# Patient Record
Sex: FEMALE | Race: WHITE | Employment: UNEMPLOYED | URBAN - METROPOLITAN AREA
[De-identification: names, ages, dates, MRNs, and addresses within clinical notes are randomized per-mention and may not be internally consistent; named-entity substitution may affect disease eponyms.]

---

## 2017-05-14 ENCOUNTER — APPOINTMENT (OUTPATIENT)
Dept: GENERAL RADIOLOGY | Age: 82
End: 2017-05-14
Payer: MEDICARE

## 2017-05-14 ENCOUNTER — HOSPITAL ENCOUNTER (EMERGENCY)
Age: 82
Discharge: HOME | End: 2017-05-14
Attending: EMERGENCY MEDICINE
Payer: MEDICARE

## 2017-05-14 VITALS
DIASTOLIC BLOOD PRESSURE: 70 MMHG | BODY MASS INDEX: 28.12 KG/M2 | SYSTOLIC BLOOD PRESSURE: 146 MMHG | TEMPERATURE: 98.6 F | OXYGEN SATURATION: 99 % | RESPIRATION RATE: 16 BRPM | HEART RATE: 72 BPM | HEIGHT: 66 IN | WEIGHT: 175 LBS

## 2017-05-14 DIAGNOSIS — N39.0 URINARY TRACT INFECTION, SITE UNSPECIFIED: Primary | ICD-10-CM

## 2017-05-14 LAB
ALBUMIN SERPL-MCNC: 4.3 G/DL (ref 3.9–4.9)
ALP BLD-CCNC: 89 U/L (ref 40–130)
ALT SERPL-CCNC: 10 U/L (ref 0–33)
AMORPHOUS: NORMAL
ANION GAP SERPL CALCULATED.3IONS-SCNC: 13 MEQ/L (ref 7–13)
AST SERPL-CCNC: 15 U/L (ref 0–35)
BASOPHILS ABSOLUTE: 0 K/UL (ref 0–0.2)
BASOPHILS RELATIVE PERCENT: 0.4 %
BILIRUB SERPL-MCNC: 0.5 MG/DL (ref 0–1.2)
BILIRUBIN URINE: NEGATIVE
BLOOD, URINE: NEGATIVE
BUN BLDV-MCNC: 11 MG/DL (ref 8–23)
CALCIUM SERPL-MCNC: 9.3 MG/DL (ref 8.6–10.2)
CHLORIDE BLD-SCNC: 101 MEQ/L (ref 98–107)
CLARITY: ABNORMAL
CO2: 23 MEQ/L (ref 22–29)
COLOR: YELLOW
CREAT SERPL-MCNC: 0.55 MG/DL (ref 0.5–0.9)
EOSINOPHILS ABSOLUTE: 0 K/UL (ref 0–0.7)
EOSINOPHILS RELATIVE PERCENT: 0.4 %
EPITHELIAL CELLS, UA: NORMAL /HPF
GFR AFRICAN AMERICAN: >60
GFR NON-AFRICAN AMERICAN: >60
GLOBULIN: 3.2 G/DL (ref 2.3–3.5)
GLUCOSE BLD-MCNC: 108 MG/DL (ref 74–109)
GLUCOSE URINE: NEGATIVE MG/DL
HCT VFR BLD CALC: 41.2 % (ref 37–47)
HEMOGLOBIN: 13.9 G/DL (ref 12–16)
KETONES, URINE: NEGATIVE MG/DL
LEUKOCYTE ESTERASE, URINE: ABNORMAL
LYMPHOCYTES ABSOLUTE: 1.9 K/UL (ref 1–4.8)
LYMPHOCYTES RELATIVE PERCENT: 33.4 %
MCH RBC QN AUTO: 31.3 PG (ref 27–31.3)
MCHC RBC AUTO-ENTMCNC: 33.8 % (ref 33–37)
MCV RBC AUTO: 92.7 FL (ref 82–100)
MONOCYTES ABSOLUTE: 0.4 K/UL (ref 0.2–0.8)
MONOCYTES RELATIVE PERCENT: 6.7 %
NEUTROPHILS ABSOLUTE: 3.4 K/UL (ref 1.4–6.5)
NEUTROPHILS RELATIVE PERCENT: 59.1 %
NITRITE, URINE: NEGATIVE
PDW BLD-RTO: 13 % (ref 11.5–14.5)
PH UA: 8 (ref 5–9)
PLATELET # BLD: 187 K/UL (ref 130–400)
POTASSIUM SERPL-SCNC: 3.8 MEQ/L (ref 3.5–5.1)
PROTEIN UA: NEGATIVE MG/DL
RBC # BLD: 4.45 M/UL (ref 4.2–5.4)
RBC UA: NORMAL /HPF (ref 0–2)
RENAL EPITHELIAL, UA: NORMAL /HPF
SODIUM BLD-SCNC: 137 MEQ/L (ref 132–144)
SPECIFIC GRAVITY UA: 1.01 (ref 1–1.03)
TOTAL PROTEIN: 7.5 G/DL (ref 6.4–8.1)
TROPONIN: <0.01 NG/ML (ref 0–0.01)
URINE REFLEX TO CULTURE: YES
UROBILINOGEN, URINE: 0.2 E.U./DL
WBC # BLD: 5.8 K/UL (ref 4.8–10.8)
WBC UA: NORMAL /HPF (ref 0–5)

## 2017-05-14 PROCEDURE — 80053 COMPREHEN METABOLIC PANEL: CPT

## 2017-05-14 PROCEDURE — 81001 URINALYSIS AUTO W/SCOPE: CPT

## 2017-05-14 PROCEDURE — 85025 COMPLETE CBC W/AUTO DIFF WBC: CPT

## 2017-05-14 PROCEDURE — 36415 COLL VENOUS BLD VENIPUNCTURE: CPT

## 2017-05-14 PROCEDURE — 87086 URINE CULTURE/COLONY COUNT: CPT

## 2017-05-14 PROCEDURE — 99284 EMERGENCY DEPT VISIT MOD MDM: CPT

## 2017-05-14 PROCEDURE — 93005 ELECTROCARDIOGRAM TRACING: CPT

## 2017-05-14 PROCEDURE — 74022 RADEX COMPL AQT ABD SERIES: CPT

## 2017-05-14 PROCEDURE — 73502 X-RAY EXAM HIP UNI 2-3 VIEWS: CPT

## 2017-05-14 PROCEDURE — 84484 ASSAY OF TROPONIN QUANT: CPT

## 2017-05-14 RX ORDER — HYDROCHLOROTHIAZIDE 12.5 MG/1
12.5 CAPSULE, GELATIN COATED ORAL DAILY
Status: ON HOLD | COMMUNITY
End: 2018-10-12

## 2017-05-14 RX ORDER — OMEPRAZOLE 40 MG/1
40 CAPSULE, DELAYED RELEASE ORAL DAILY
COMMUNITY

## 2017-05-14 RX ORDER — LISINOPRIL 10 MG/1
10 TABLET ORAL 2 TIMES DAILY
Status: ON HOLD | COMMUNITY
End: 2018-10-14

## 2017-05-14 RX ORDER — CEPHALEXIN 250 MG/1
250 CAPSULE ORAL 4 TIMES DAILY
Qty: 40 CAPSULE | Refills: 0 | Status: SHIPPED | OUTPATIENT
Start: 2017-05-14 | End: 2017-10-29 | Stop reason: ALTCHOICE

## 2017-05-14 ASSESSMENT — ENCOUNTER SYMPTOMS
VOMITING: 0
TROUBLE SWALLOWING: 0
STRIDOR: 0
PHOTOPHOBIA: 0
SHORTNESS OF BREATH: 0
BACK PAIN: 0
EYE ITCHING: 0
NAUSEA: 1
RHINORRHEA: 0
ABDOMINAL PAIN: 1
WHEEZING: 0
COLOR CHANGE: 0
CHEST TIGHTNESS: 0
CHOKING: 0
FACIAL SWELLING: 0
SINUS PRESSURE: 0
ANAL BLEEDING: 0
COUGH: 0
ABDOMINAL DISTENTION: 0
EYE PAIN: 0
CONSTIPATION: 1
EYE DISCHARGE: 0
BLOOD IN STOOL: 0
VOICE CHANGE: 0
EYE REDNESS: 0
SORE THROAT: 0
DIARRHEA: 0

## 2017-05-15 LAB
EKG ATRIAL RATE: 79 BPM
EKG P AXIS: 57 DEGREES
EKG P-R INTERVAL: 170 MS
EKG Q-T INTERVAL: 372 MS
EKG QRS DURATION: 70 MS
EKG QTC CALCULATION (BAZETT): 426 MS
EKG R AXIS: 41 DEGREES
EKG T AXIS: 61 DEGREES
EKG VENTRICULAR RATE: 79 BPM

## 2017-05-16 LAB — URINE CULTURE, ROUTINE: NORMAL

## 2017-10-29 ENCOUNTER — HOSPITAL ENCOUNTER (EMERGENCY)
Age: 82
Discharge: HOME | End: 2017-10-29
Payer: MEDICARE

## 2017-10-29 ENCOUNTER — APPOINTMENT (OUTPATIENT)
Dept: GENERAL RADIOLOGY | Age: 82
End: 2017-10-29
Payer: MEDICARE

## 2017-10-29 VITALS
HEART RATE: 80 BPM | WEIGHT: 180 LBS | RESPIRATION RATE: 20 BRPM | SYSTOLIC BLOOD PRESSURE: 168 MMHG | DIASTOLIC BLOOD PRESSURE: 92 MMHG | OXYGEN SATURATION: 97 % | BODY MASS INDEX: 28.25 KG/M2 | HEIGHT: 67 IN | TEMPERATURE: 97.7 F

## 2017-10-29 DIAGNOSIS — S93.401A SPRAIN OF RIGHT ANKLE, UNSPECIFIED LIGAMENT, INITIAL ENCOUNTER: Primary | ICD-10-CM

## 2017-10-29 PROCEDURE — 99283 EMERGENCY DEPT VISIT LOW MDM: CPT

## 2017-10-29 PROCEDURE — 73630 X-RAY EXAM OF FOOT: CPT

## 2017-10-29 PROCEDURE — 6360000002 HC RX W HCPCS: Performed by: PHYSICIAN ASSISTANT

## 2017-10-29 PROCEDURE — 73610 X-RAY EXAM OF ANKLE: CPT

## 2017-10-29 RX ORDER — ONDANSETRON 4 MG/1
4 TABLET, ORALLY DISINTEGRATING ORAL ONCE
Status: COMPLETED | OUTPATIENT
Start: 2017-10-29 | End: 2017-10-29

## 2017-10-29 RX ORDER — TRAMADOL HYDROCHLORIDE 50 MG/1
50 TABLET ORAL EVERY 6 HOURS PRN
Qty: 8 TABLET | Refills: 0 | Status: SHIPPED | OUTPATIENT
Start: 2017-10-29 | End: 2017-11-06

## 2017-10-29 RX ADMIN — ONDANSETRON 4 MG: 4 TABLET, ORALLY DISINTEGRATING ORAL at 07:58

## 2017-10-29 ASSESSMENT — PAIN DESCRIPTION - LOCATION: LOCATION: ANKLE

## 2017-10-29 ASSESSMENT — ENCOUNTER SYMPTOMS
EYE DISCHARGE: 0
SHORTNESS OF BREATH: 0
COLOR CHANGE: 0
ABDOMINAL PAIN: 0
CONSTIPATION: 0
STRIDOR: 0
SORE THROAT: 0
WHEEZING: 0
ABDOMINAL DISTENTION: 0
COUGH: 0
CHOKING: 0
RHINORRHEA: 0

## 2017-10-29 ASSESSMENT — PAIN SCALES - GENERAL: PAINLEVEL_OUTOF10: 9

## 2017-10-29 ASSESSMENT — PAIN DESCRIPTION - ORIENTATION: ORIENTATION: RIGHT

## 2017-10-29 ASSESSMENT — PAIN DESCRIPTION - DESCRIPTORS: DESCRIPTORS: THROBBING

## 2017-10-29 NOTE — ED NOTES
Pt back in [address] [place] [address], [address] [place] [address]  10/29/17 4132

## 2017-10-29 NOTE — ED PROVIDER NOTES
[facility] ED  eMERGENCY dEPARTMENT eNCOUnter      Pt Name: Anuj Dumont  MRN: [id number]  Rociogftheo 1935  Date of evaluation: 10/29/2017  Provider: Reyes Novak PA-C    CHIEF COMPLAINT       Chief Complaint   Patient presents with   Santy Pérez     was getting out of bed leaned forward down was a little dizzy fell forward landing on knees and now pain in right leg/ankle    Ankle Pain     right         HISTORY OF PRESENT ILLNESS   (Location/Symptom, Timing/Onset, Context/Setting, Quality, Duration, Modifying Factors, Severity)  Note limiting factors. Anuj Dumont is a [de-identified] y.o. female who presents to the emergency department With a complaint of right foot and ankle pain after a fall which occurred this morning. Patient states at 1:00 this morning she got up out of bed to use the bathroom she said she was sleepy she lost her balance and fell twisting her right ankle. She states increased pain with ambulation to the right foot and right ankle since the incident occurred she's been able to get up and bear weight with no other complaints. She denies any loss of consciousness she denies any head neck or back pain no chest pain or shortness of breath. She rates her current pain as a 9 out of 10 when she stands there is 0 out of 10 when she is at rest.    HPI    Nursing Notes were reviewed. REVIEW OF SYSTEMS    (2-9 systems for level 4, 10 or more for level 5)     Review of Systems   Constitutional: Negative for activity change and appetite change. HENT: Negative for congestion, ear discharge, ear pain, nosebleeds, rhinorrhea and sore throat. Eyes: Negative for discharge. Respiratory: Negative for cough, choking, shortness of breath, wheezing and stridor. Cardiovascular: Negative for chest pain, palpitations and leg swelling. Gastrointestinal: Negative for abdominal distention, abdominal pain and constipation. Genitourinary: Negative for difficulty urinating and dysuria.

## 2017-10-29 NOTE — ED TRIAGE NOTES
Pt comes in s/p fall last night around 0100. Pt made it back to bed and called daughter this am. Denies LOC. Denies hitting head. Pain is in right foot and ankle. Full sensation to RLE.

## 2018-10-09 ENCOUNTER — HOSPITAL ENCOUNTER (EMERGENCY)
Age: 83
Discharge: HOME | End: 2018-10-09
Attending: EMERGENCY MEDICINE
Payer: MEDICARE

## 2018-10-09 VITALS
OXYGEN SATURATION: 97 % | HEART RATE: 81 BPM | HEIGHT: 66 IN | SYSTOLIC BLOOD PRESSURE: 139 MMHG | DIASTOLIC BLOOD PRESSURE: 87 MMHG | TEMPERATURE: 98.5 F | WEIGHT: 180 LBS | RESPIRATION RATE: 20 BRPM | BODY MASS INDEX: 28.93 KG/M2

## 2018-10-09 DIAGNOSIS — F41.9 ANXIETY: Primary | ICD-10-CM

## 2018-10-09 DIAGNOSIS — E86.0 DEHYDRATION, MILD: ICD-10-CM

## 2018-10-09 LAB
ANION GAP SERPL CALCULATED.3IONS-SCNC: 16 MEQ/L (ref 7–13)
BILIRUBIN URINE: NEGATIVE
BLOOD, URINE: ABNORMAL
BUN BLDV-MCNC: 11 MG/DL (ref 8–23)
CALCIUM SERPL-MCNC: 9.4 MG/DL (ref 8.6–10.2)
CHLORIDE BLD-SCNC: 100 MEQ/L (ref 98–107)
CLARITY: CLEAR
CO2: 23 MEQ/L (ref 22–29)
COLOR: YELLOW
CREAT SERPL-MCNC: 0.49 MG/DL (ref 0.5–0.9)
EKG ATRIAL RATE: 76 BPM
EKG P AXIS: 26 DEGREES
EKG P-R INTERVAL: 156 MS
EKG Q-T INTERVAL: 372 MS
EKG QRS DURATION: 70 MS
EKG QTC CALCULATION (BAZETT): 418 MS
EKG R AXIS: 8 DEGREES
EKG T AXIS: 37 DEGREES
EKG VENTRICULAR RATE: 76 BPM
EPITHELIAL CELLS, UA: NORMAL /HPF
GFR AFRICAN AMERICAN: >60
GFR NON-AFRICAN AMERICAN: >60
GLUCOSE BLD-MCNC: 119 MG/DL (ref 74–109)
GLUCOSE URINE: NEGATIVE MG/DL
HCT VFR BLD CALC: 41.2 % (ref 37–47)
HEMOGLOBIN: 14.3 G/DL (ref 12–16)
KETONES, URINE: NEGATIVE MG/DL
LEUKOCYTE ESTERASE, URINE: ABNORMAL
MAGNESIUM: 2.1 MG/DL (ref 1.7–2.3)
MCH RBC QN AUTO: 32.2 PG (ref 27–31.3)
MCHC RBC AUTO-ENTMCNC: 34.6 % (ref 33–37)
MCV RBC AUTO: 93.2 FL (ref 82–100)
NITRITE, URINE: NEGATIVE
PDW BLD-RTO: 13.2 % (ref 11.5–14.5)
PH UA: 6.5 (ref 5–9)
PLATELET # BLD: 192 K/UL (ref 130–400)
POTASSIUM SERPL-SCNC: 3.5 MEQ/L (ref 3.5–5.1)
PROTEIN UA: NEGATIVE MG/DL
RBC # BLD: 4.42 M/UL (ref 4.2–5.4)
RBC UA: NORMAL /HPF (ref 0–2)
RENAL EPITHELIAL, UA: NORMAL /HPF
SODIUM BLD-SCNC: 139 MEQ/L (ref 132–144)
SPECIFIC GRAVITY UA: 1.01 (ref 1–1.03)
TROPONIN: <0.01 NG/ML (ref 0–0.01)
URINE REFLEX TO CULTURE: YES
UROBILINOGEN, URINE: 0.2 E.U./DL
WBC # BLD: 8.5 K/UL (ref 4.8–10.8)
WBC UA: NORMAL /HPF (ref 0–5)

## 2018-10-09 PROCEDURE — 99283 EMERGENCY DEPT VISIT LOW MDM: CPT

## 2018-10-09 PROCEDURE — 85027 COMPLETE CBC AUTOMATED: CPT

## 2018-10-09 PROCEDURE — 6360000002 HC RX W HCPCS: Performed by: EMERGENCY MEDICINE

## 2018-10-09 PROCEDURE — 96374 THER/PROPH/DIAG INJ IV PUSH: CPT

## 2018-10-09 PROCEDURE — 2500000003 HC RX 250 WO HCPCS: Performed by: EMERGENCY MEDICINE

## 2018-10-09 PROCEDURE — 87086 URINE CULTURE/COLONY COUNT: CPT

## 2018-10-09 PROCEDURE — 36415 COLL VENOUS BLD VENIPUNCTURE: CPT

## 2018-10-09 PROCEDURE — 96375 TX/PRO/DX INJ NEW DRUG ADDON: CPT

## 2018-10-09 PROCEDURE — 83735 ASSAY OF MAGNESIUM: CPT

## 2018-10-09 PROCEDURE — 81001 URINALYSIS AUTO W/SCOPE: CPT

## 2018-10-09 PROCEDURE — 93010 ELECTROCARDIOGRAM REPORT: CPT | Performed by: INTERNAL MEDICINE

## 2018-10-09 PROCEDURE — 80048 BASIC METABOLIC PNL TOTAL CA: CPT

## 2018-10-09 PROCEDURE — 2580000003 HC RX 258: Performed by: EMERGENCY MEDICINE

## 2018-10-09 PROCEDURE — S0028 INJECTION, FAMOTIDINE, 20 MG: HCPCS | Performed by: EMERGENCY MEDICINE

## 2018-10-09 PROCEDURE — 84484 ASSAY OF TROPONIN QUANT: CPT

## 2018-10-09 PROCEDURE — 93005 ELECTROCARDIOGRAM TRACING: CPT

## 2018-10-09 RX ORDER — ONDANSETRON 2 MG/ML
4 INJECTION INTRAMUSCULAR; INTRAVENOUS ONCE
Status: COMPLETED | OUTPATIENT
Start: 2018-10-09 | End: 2018-10-09

## 2018-10-09 RX ORDER — 0.9 % SODIUM CHLORIDE 0.9 %
500 INTRAVENOUS SOLUTION INTRAVENOUS ONCE
Status: COMPLETED | OUTPATIENT
Start: 2018-10-09 | End: 2018-10-09

## 2018-10-09 RX ADMIN — FAMOTIDINE 20 MG: 10 INJECTION, SOLUTION INTRAVENOUS at 10:41

## 2018-10-09 RX ADMIN — SODIUM CHLORIDE 500 ML: 9 INJECTION, SOLUTION INTRAVENOUS at 10:41

## 2018-10-09 RX ADMIN — ONDANSETRON 4 MG: 2 INJECTION INTRAMUSCULAR; INTRAVENOUS at 10:42

## 2018-10-09 ASSESSMENT — ENCOUNTER SYMPTOMS
COUGH: 0
NAUSEA: 1
SHORTNESS OF BREATH: 0
VOMITING: 0
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
## 2018-10-09 NOTE — ED NOTES
Pt up to bathroom with assist by 2 family members     Efrain Waldron.  [facility]  10/09/18 8312

## 2018-10-11 LAB — URINE CULTURE, ROUTINE: NORMAL

## 2018-10-12 ENCOUNTER — APPOINTMENT (OUTPATIENT)
Dept: GENERAL RADIOLOGY | Age: 83
End: 2018-10-12
Payer: MEDICARE

## 2018-10-12 ENCOUNTER — HOSPITAL ENCOUNTER (OUTPATIENT)
Age: 83
Setting detail: OBSERVATION
Discharge: HOME | End: 2018-10-14
Attending: EMERGENCY MEDICINE | Admitting: INTERNAL MEDICINE
Payer: MEDICARE

## 2018-10-12 DIAGNOSIS — R07.9 CHEST PAIN, UNSPECIFIED TYPE: Primary | ICD-10-CM

## 2018-10-12 PROBLEM — I16.0 HYPERTENSIVE URGENCY: Status: ACTIVE | Noted: 2018-10-12

## 2018-10-12 PROBLEM — I20.89 ANGINA AT REST (HCC): Status: ACTIVE | Noted: 2018-10-12

## 2018-10-12 PROBLEM — R53.82 CHRONIC FATIGUE: Status: ACTIVE | Noted: 2018-10-12

## 2018-10-12 PROBLEM — I20.8 ANGINA AT REST (HCC): Status: ACTIVE | Noted: 2018-10-12

## 2018-10-12 LAB
ALBUMIN SERPL-MCNC: 4.2 G/DL (ref 3.9–4.9)
ALP BLD-CCNC: 84 U/L (ref 40–130)
ALT SERPL-CCNC: 11 U/L (ref 0–33)
ANION GAP SERPL CALCULATED.3IONS-SCNC: 12 MEQ/L (ref 7–13)
APTT: 30.3 SEC (ref 21.6–35.4)
AST SERPL-CCNC: 21 U/L (ref 0–35)
BASOPHILS ABSOLUTE: 0 K/UL (ref 0–0.2)
BASOPHILS RELATIVE PERCENT: 0.4 %
BILIRUB SERPL-MCNC: 0.5 MG/DL (ref 0–1.2)
BUN BLDV-MCNC: 6 MG/DL (ref 8–23)
CALCIUM SERPL-MCNC: 9.4 MG/DL (ref 8.6–10.2)
CHLORIDE BLD-SCNC: 99 MEQ/L (ref 98–107)
CK MB: 1.7 NG/ML (ref 0–3.8)
CO2: 28 MEQ/L (ref 22–29)
CREAT SERPL-MCNC: 0.4 MG/DL (ref 0.5–0.9)
CREATINE KINASE-MB INDEX: 1.5 % (ref 0–3.5)
D DIMER: 0.41 MG/L FEU (ref 0–0.5)
EOSINOPHILS ABSOLUTE: 0 K/UL (ref 0–0.7)
EOSINOPHILS RELATIVE PERCENT: 0.7 %
GFR AFRICAN AMERICAN: >60
GFR NON-AFRICAN AMERICAN: >60
GLOBULIN: 3 G/DL (ref 2.3–3.5)
GLUCOSE BLD-MCNC: 105 MG/DL (ref 74–109)
HCT VFR BLD CALC: 42 % (ref 37–47)
HEMOGLOBIN: 14.2 G/DL (ref 12–16)
INR BLD: 1
LYMPHOCYTES ABSOLUTE: 1.8 K/UL (ref 1–4.8)
LYMPHOCYTES RELATIVE PERCENT: 28 %
MCH RBC QN AUTO: 31.5 PG (ref 27–31.3)
MCHC RBC AUTO-ENTMCNC: 33.7 % (ref 33–37)
MCV RBC AUTO: 93.6 FL (ref 82–100)
MONOCYTES ABSOLUTE: 0.4 K/UL (ref 0.2–0.8)
MONOCYTES RELATIVE PERCENT: 6.2 %
NEUTROPHILS ABSOLUTE: 4.1 K/UL (ref 1.4–6.5)
NEUTROPHILS RELATIVE PERCENT: 64.7 %
PDW BLD-RTO: 13.2 % (ref 11.5–14.5)
PLATELET # BLD: 191 K/UL (ref 130–400)
POTASSIUM SERPL-SCNC: 3.7 MEQ/L (ref 3.5–5.1)
PRO-BNP: 96 PG/ML
PROTHROMBIN TIME: 10.7 SEC (ref 9.6–12.3)
RBC # BLD: 4.49 M/UL (ref 4.2–5.4)
SODIUM BLD-SCNC: 139 MEQ/L (ref 132–144)
TOTAL CK: 110 U/L (ref 0–170)
TOTAL PROTEIN: 7.2 G/DL (ref 6.4–8.1)
TROPONIN: <0.01 NG/ML (ref 0–0.01)
TROPONIN: <0.01 NG/ML (ref 0–0.01)
TSH SERPL DL<=0.05 MIU/L-ACNC: 0.56 UIU/ML (ref 0.27–4.2)
TSH SERPL DL<=0.05 MIU/L-ACNC: 0.94 UIU/ML (ref 0.27–4.2)
WBC # BLD: 6.3 K/UL (ref 4.8–10.8)

## 2018-10-12 PROCEDURE — 82550 ASSAY OF CK (CPK): CPT

## 2018-10-12 PROCEDURE — G0378 HOSPITAL OBSERVATION PER HR: HCPCS

## 2018-10-12 PROCEDURE — 96376 TX/PRO/DX INJ SAME DRUG ADON: CPT

## 2018-10-12 PROCEDURE — 2580000003 HC RX 258: Performed by: INTERNAL MEDICINE

## 2018-10-12 PROCEDURE — 85025 COMPLETE CBC W/AUTO DIFF WBC: CPT

## 2018-10-12 PROCEDURE — 84443 ASSAY THYROID STIM HORMONE: CPT

## 2018-10-12 PROCEDURE — 84484 ASSAY OF TROPONIN QUANT: CPT

## 2018-10-12 PROCEDURE — 74018 RADEX ABDOMEN 1 VIEW: CPT

## 2018-10-12 PROCEDURE — 83880 ASSAY OF NATRIURETIC PEPTIDE: CPT

## 2018-10-12 PROCEDURE — 85379 FIBRIN DEGRADATION QUANT: CPT

## 2018-10-12 PROCEDURE — 6370000000 HC RX 637 (ALT 250 FOR IP): Performed by: INTERNAL MEDICINE

## 2018-10-12 PROCEDURE — 99285 EMERGENCY DEPT VISIT HI MDM: CPT

## 2018-10-12 PROCEDURE — 93005 ELECTROCARDIOGRAM TRACING: CPT

## 2018-10-12 PROCEDURE — 2580000003 HC RX 258: Performed by: EMERGENCY MEDICINE

## 2018-10-12 PROCEDURE — 96374 THER/PROPH/DIAG INJ IV PUSH: CPT

## 2018-10-12 PROCEDURE — 96372 THER/PROPH/DIAG INJ SC/IM: CPT

## 2018-10-12 PROCEDURE — 6370000000 HC RX 637 (ALT 250 FOR IP): Performed by: EMERGENCY MEDICINE

## 2018-10-12 PROCEDURE — 71045 X-RAY EXAM CHEST 1 VIEW: CPT

## 2018-10-12 PROCEDURE — 6360000002 HC RX W HCPCS: Performed by: INTERNAL MEDICINE

## 2018-10-12 PROCEDURE — 85610 PROTHROMBIN TIME: CPT

## 2018-10-12 PROCEDURE — 85730 THROMBOPLASTIN TIME PARTIAL: CPT

## 2018-10-12 PROCEDURE — 82553 CREATINE MB FRACTION: CPT

## 2018-10-12 PROCEDURE — 96375 TX/PRO/DX INJ NEW DRUG ADDON: CPT

## 2018-10-12 PROCEDURE — 80053 COMPREHEN METABOLIC PANEL: CPT

## 2018-10-12 PROCEDURE — 36415 COLL VENOUS BLD VENIPUNCTURE: CPT

## 2018-10-12 RX ORDER — ACETAMINOPHEN 325 MG/1
650 TABLET ORAL EVERY 4 HOURS PRN
Status: DISCONTINUED | OUTPATIENT
Start: 2018-10-12 | End: 2018-10-14 | Stop reason: HOSPADM

## 2018-10-12 RX ORDER — AMLODIPINE BESYLATE 2.5 MG/1
2.5 TABLET ORAL DAILY
Status: DISCONTINUED | OUTPATIENT
Start: 2018-10-12 | End: 2018-10-14

## 2018-10-12 RX ORDER — HYDROCHLOROTHIAZIDE 12.5 MG/1
12.5 CAPSULE, GELATIN COATED ORAL DAILY
Status: DISCONTINUED | OUTPATIENT
Start: 2018-10-12 | End: 2018-10-12

## 2018-10-12 RX ORDER — PANTOPRAZOLE SODIUM 40 MG/1
40 TABLET, DELAYED RELEASE ORAL
Status: DISCONTINUED | OUTPATIENT
Start: 2018-10-13 | End: 2018-10-12

## 2018-10-12 RX ORDER — OMEPRAZOLE 20 MG/1
40 CAPSULE, DELAYED RELEASE ORAL
Status: DISCONTINUED | OUTPATIENT
Start: 2018-10-13 | End: 2018-10-14 | Stop reason: HOSPADM

## 2018-10-12 RX ORDER — AMLODIPINE BESYLATE 2.5 MG/1
2.5 TABLET ORAL DAILY
Status: ON HOLD | COMMUNITY
End: 2018-10-14 | Stop reason: HOSPADM

## 2018-10-12 RX ORDER — MORPHINE SULFATE 4 MG/ML
4 INJECTION, SOLUTION INTRAMUSCULAR; INTRAVENOUS
Status: ACTIVE | OUTPATIENT
Start: 2018-10-12 | End: 2018-10-13

## 2018-10-12 RX ORDER — ONDANSETRON 2 MG/ML
4 INJECTION INTRAMUSCULAR; INTRAVENOUS EVERY 6 HOURS PRN
Status: DISCONTINUED | OUTPATIENT
Start: 2018-10-12 | End: 2018-10-14 | Stop reason: HOSPADM

## 2018-10-12 RX ORDER — KETOROLAC TROMETHAMINE 30 MG/ML
30 INJECTION, SOLUTION INTRAMUSCULAR; INTRAVENOUS ONCE
Status: DISCONTINUED | OUTPATIENT
Start: 2018-10-12 | End: 2018-10-12

## 2018-10-12 RX ORDER — ATORVASTATIN CALCIUM 20 MG/1
20 TABLET, FILM COATED ORAL NIGHTLY
Status: DISCONTINUED | OUTPATIENT
Start: 2018-10-12 | End: 2018-10-14 | Stop reason: HOSPADM

## 2018-10-12 RX ORDER — ASPIRIN 81 MG/1
324 TABLET, CHEWABLE ORAL ONCE
Status: COMPLETED | OUTPATIENT
Start: 2018-10-12 | End: 2018-10-12

## 2018-10-12 RX ORDER — LISINOPRIL 10 MG/1
10 TABLET ORAL DAILY
Status: DISCONTINUED | OUTPATIENT
Start: 2018-10-12 | End: 2018-10-14

## 2018-10-12 RX ORDER — 0.9 % SODIUM CHLORIDE 0.9 %
1000 INTRAVENOUS SOLUTION INTRAVENOUS ONCE
Status: DISCONTINUED | OUTPATIENT
Start: 2018-10-12 | End: 2018-10-12

## 2018-10-12 RX ORDER — MORPHINE SULFATE 2 MG/ML
2 INJECTION, SOLUTION INTRAMUSCULAR; INTRAVENOUS
Status: ACTIVE | OUTPATIENT
Start: 2018-10-12 | End: 2018-10-13

## 2018-10-12 RX ORDER — ASPIRIN 81 MG/1
81 TABLET, CHEWABLE ORAL DAILY
Status: DISCONTINUED | OUTPATIENT
Start: 2018-10-13 | End: 2018-10-14 | Stop reason: HOSPADM

## 2018-10-12 RX ORDER — NITROGLYCERIN 0.4 MG/1
0.4 TABLET SUBLINGUAL EVERY 5 MIN PRN
Status: DISCONTINUED | OUTPATIENT
Start: 2018-10-12 | End: 2018-10-14 | Stop reason: HOSPADM

## 2018-10-12 RX ORDER — FENTANYL CITRATE 50 UG/ML
50 INJECTION, SOLUTION INTRAMUSCULAR; INTRAVENOUS ONCE
Status: DISCONTINUED | OUTPATIENT
Start: 2018-10-12 | End: 2018-10-12

## 2018-10-12 RX ORDER — SODIUM CHLORIDE 0.9 % (FLUSH) 0.9 %
10 SYRINGE (ML) INJECTION PRN
Status: DISCONTINUED | OUTPATIENT
Start: 2018-10-12 | End: 2018-10-14 | Stop reason: HOSPADM

## 2018-10-12 RX ORDER — SODIUM CHLORIDE 0.9 % (FLUSH) 0.9 %
10 SYRINGE (ML) INJECTION EVERY 12 HOURS SCHEDULED
Status: DISCONTINUED | OUTPATIENT
Start: 2018-10-12 | End: 2018-10-14 | Stop reason: HOSPADM

## 2018-10-12 RX ADMIN — ENOXAPARIN SODIUM 40 MG: 40 INJECTION SUBCUTANEOUS at 20:39

## 2018-10-12 RX ADMIN — AMLODIPINE BESYLATE 2.5 MG: 2.5 TABLET ORAL at 20:38

## 2018-10-12 RX ADMIN — LISINOPRIL 10 MG: 10 TABLET ORAL at 20:38

## 2018-10-12 RX ADMIN — ACETAMINOPHEN 650 MG: 325 TABLET ORAL at 17:46

## 2018-10-12 RX ADMIN — SODIUM CHLORIDE 1000 ML: 9 INJECTION, SOLUTION INTRAVENOUS at 10:07

## 2018-10-12 RX ADMIN — ASPIRIN 324 MG: 81 TABLET, CHEWABLE ORAL at 09:47

## 2018-10-12 RX ADMIN — NITROGLYCERIN 0.4 MG: 0.4 TABLET, ORALLY DISINTEGRATING SUBLINGUAL at 09:52

## 2018-10-12 RX ADMIN — ATORVASTATIN CALCIUM 20 MG: 20 TABLET, FILM COATED ORAL at 20:39

## 2018-10-12 RX ADMIN — NITROGLYCERIN 0.5 INCH: 20 OINTMENT TOPICAL at 10:13

## 2018-10-12 RX ADMIN — Medication 10 ML: at 20:40

## 2018-10-12 ASSESSMENT — ENCOUNTER SYMPTOMS
VOMITING: 0
RHINORRHEA: 0
CHEST TIGHTNESS: 1
COLOR CHANGE: 0
ABDOMINAL DISTENTION: 0
NAUSEA: 1
EYE DISCHARGE: 0
SHORTNESS OF BREATH: 1
WHEEZING: 0
FACIAL SWELLING: 0
ABDOMINAL PAIN: 0
PHOTOPHOBIA: 0

## 2018-10-12 ASSESSMENT — PAIN DESCRIPTION - PAIN TYPE: TYPE: ACUTE PAIN

## 2018-10-12 ASSESSMENT — PAIN SCALES - GENERAL
PAINLEVEL_OUTOF10: 0
PAINLEVEL_OUTOF10: 2
PAINLEVEL_OUTOF10: 8
PAINLEVEL_OUTOF10: 0

## 2018-10-12 ASSESSMENT — PAIN DESCRIPTION - LOCATION: LOCATION: HEAD

## 2018-10-12 NOTE — H&P
ON 10/13/2018] omeprazole (PRILOSEC) delayed release capsule 40 mg  40 mg Oral SADIAM AMARIS Hadley MD           SocialHistory:   Social History   Substance Use Topics    Smoking status: Never Smoker    Smokeless tobacco: Never Used    Alcohol use No        Family History:   History reviewed. No pertinent family history. Review of Systems: Is negative or noncontributory except as noted. Review of Systems      Physical Examination:  BP (!) 146/89   Pulse 80   Temp 98.8 °F (37.1 °C) (Oral)   Resp 16   Ht 5' 6.5\" (1.689 m)   Wt 180 lb (81.6 kg)   SpO2 98%   BMI 28.62 kg/m²      Intake/Output Summary (Last 24 hours) at 10/12/18 1934  Last data filed at 10/12/18 1832   Gross per 24 hour   Intake              300 ml   Output                0 ml   Net              300 ml     Patient Vitals for the past 96 hrs (Last 3 readings):   Weight   10/12/18 0847 180 lb (81.6 kg)        Physical Exam     alert and oriented ×3 no jugular venous distention or carotid bruit lungs clear heart sounds regular without murmur rub or gallop abdomen is nontender extremities show no edema distal pulses are palpable 2+ out of 4+ neurologically she is alert oriented ×3 has no gross cranial nerve deficits and moves all extremities. Skin shows no petechia or rash. Musculoskeletal no major deformities identified. normal sinus rhythm, unchanged from previous tracings, normal EKG, normalsinus rhythm    Diagnostics:    EKG: Normal sinus rhythm baseline artifact no diagnostic ST-T abnormalities or pathological Q waves. Telemetry: normal sinus .       Lab Data:  BMP:  Recent Labs      10/12/18   0912   NA  139   K  3.7   CL  99   CO2  28   BUN  6*   CREATININE  0.40*   LABGLOM  >60.0       Cardiac Enzymes:  Recent Labs      10/12/18   0912  10/12/18   1325   CKTOTAL  110   --    CKMB  1.7   --    CKMBINDEX  1.5   --    TROPONINI  <0.010  <0.010       CBC:   Lab Results   Component Value Date    WBC 6.3 10/12/2018    RBC 4.49

## 2018-10-12 NOTE — ED PROVIDER NOTES
Negative for difficulty urinating, frequency, vaginal bleeding and vaginal discharge. Musculoskeletal: Negative for gait problem. Skin: Negative for color change. Allergic/Immunologic: Negative for immunocompromised state. Neurological: Positive for weakness. Negative for dizziness and light-headedness. Hematological: Negative for adenopathy. Psychiatric/Behavioral: Negative for behavioral problems. Except as noted above the remainder of the review of systems was reviewed and negative. PAST MEDICAL HISTORY     Past Medical History:   Diagnosis Date    Anxiety     Hypertension          SURGICALHISTORY       Past Surgical History:   Procedure Laterality Date    ESOPHAGUS SURGERY      FLIPPED STOMACH         CURRENT MEDICATIONS       Previous Medications    HYDROCHLOROTHIAZIDE (MICROZIDE) 12.5 MG CAPSULE    Take 12.5 mg by mouth daily    LISINOPRIL (PRINIVIL;ZESTRIL) 10 MG TABLET    Take 10 mg by mouth daily    OMEPRAZOLE (PRILOSEC) 20 MG DELAYED RELEASE CAPSULE    Take 20 mg by mouth daily       ALLERGIES     Codeine    FAMILY HISTORY     History reviewed. No pertinent family history. SOCIAL HISTORY       Social History     Social History    Marital status: Single     Spouse name: N/A    Number of children: N/A    Years of education: N/A     Social History Main Topics    Smoking status: Never Smoker    Smokeless tobacco: Never Used    Alcohol use No    Drug use: No    Sexual activity: Not Asked     Other Topics Concern    None     Social History Narrative    None       SCREENINGS      @FLOW([id number])@      PHYSICAL EXAM    (up to 7 for level 4, 8 or more for level 5)     ED Triage Vitals [10/12/18 0847]   BP Temp Temp Source Pulse Resp SpO2 Height Weight   (!) 155/89 98.4 °F (36.9 °C) Oral 72 18 96 % 5' 6.5\" (1.689 m) 180 lb (81.6 kg)       Physical Exam   Constitutional: She is oriented to person, place, and time. She appears well-developed and well-nourished.    HENT:

## 2018-10-13 ENCOUNTER — APPOINTMENT (OUTPATIENT)
Dept: GENERAL RADIOLOGY | Age: 83
End: 2018-10-13
Payer: MEDICARE

## 2018-10-13 LAB
CHOLESTEROL, TOTAL: 198 MG/DL (ref 0–199)
HCT VFR BLD CALC: 39.2 % (ref 37–47)
HDLC SERPL-MCNC: 51 MG/DL (ref 40–59)
HEMOGLOBIN: 13.1 G/DL (ref 12–16)
INR BLD: 1.1
LDL CHOLESTEROL CALCULATED: 125 MG/DL (ref 0–129)
LV EF: 60 %
LVEF MODALITY: NORMAL
MAGNESIUM: 2 MG/DL (ref 1.7–2.3)
MCH RBC QN AUTO: 31.5 PG (ref 27–31.3)
MCHC RBC AUTO-ENTMCNC: 33.4 % (ref 33–37)
MCV RBC AUTO: 94.3 FL (ref 82–100)
PDW BLD-RTO: 13 % (ref 11.5–14.5)
PLATELET # BLD: 183 K/UL (ref 130–400)
PROTHROMBIN TIME: 11.1 SEC (ref 9.6–12.3)
RBC # BLD: 4.16 M/UL (ref 4.2–5.4)
TRIGL SERPL-MCNC: 112 MG/DL (ref 0–200)
WBC # BLD: 4.1 K/UL (ref 4.8–10.8)

## 2018-10-13 PROCEDURE — 6360000002 HC RX W HCPCS: Performed by: INTERNAL MEDICINE

## 2018-10-13 PROCEDURE — 80061 LIPID PANEL: CPT

## 2018-10-13 PROCEDURE — 83735 ASSAY OF MAGNESIUM: CPT

## 2018-10-13 PROCEDURE — 85610 PROTHROMBIN TIME: CPT

## 2018-10-13 PROCEDURE — G0378 HOSPITAL OBSERVATION PER HR: HCPCS

## 2018-10-13 PROCEDURE — 6370000000 HC RX 637 (ALT 250 FOR IP): Performed by: INTERNAL MEDICINE

## 2018-10-13 PROCEDURE — 93005 ELECTROCARDIOGRAM TRACING: CPT

## 2018-10-13 PROCEDURE — 93306 TTE W/DOPPLER COMPLETE: CPT

## 2018-10-13 PROCEDURE — 96372 THER/PROPH/DIAG INJ SC/IM: CPT

## 2018-10-13 PROCEDURE — 36415 COLL VENOUS BLD VENIPUNCTURE: CPT

## 2018-10-13 PROCEDURE — 2580000003 HC RX 258: Performed by: INTERNAL MEDICINE

## 2018-10-13 PROCEDURE — 85027 COMPLETE CBC AUTOMATED: CPT

## 2018-10-13 PROCEDURE — 74018 RADEX ABDOMEN 1 VIEW: CPT

## 2018-10-13 RX ORDER — LORAZEPAM 0.5 MG/1
0.5 TABLET ORAL NIGHTLY PRN
Status: DISCONTINUED | OUTPATIENT
Start: 2018-10-13 | End: 2018-10-14 | Stop reason: HOSPADM

## 2018-10-13 RX ORDER — DOCUSATE SODIUM 100 MG/1
100 CAPSULE, LIQUID FILLED ORAL 2 TIMES DAILY PRN
Status: DISCONTINUED | OUTPATIENT
Start: 2018-10-13 | End: 2018-10-14 | Stop reason: HOSPADM

## 2018-10-13 RX ADMIN — ONDANSETRON 4 MG: 2 INJECTION INTRAMUSCULAR; INTRAVENOUS at 18:45

## 2018-10-13 RX ADMIN — ATORVASTATIN CALCIUM 20 MG: 20 TABLET, FILM COATED ORAL at 20:32

## 2018-10-13 RX ADMIN — LISINOPRIL 10 MG: 10 TABLET ORAL at 07:42

## 2018-10-13 RX ADMIN — DOCUSATE SODIUM 100 MG: 100 CAPSULE, LIQUID FILLED ORAL at 10:38

## 2018-10-13 RX ADMIN — AMLODIPINE BESYLATE 2.5 MG: 2.5 TABLET ORAL at 07:41

## 2018-10-13 RX ADMIN — ASPIRIN 81 MG: 81 TABLET, CHEWABLE ORAL at 07:46

## 2018-10-13 RX ADMIN — LORAZEPAM 0.5 MG: 0.5 TABLET ORAL at 20:32

## 2018-10-13 RX ADMIN — Medication 10 ML: at 22:27

## 2018-10-13 RX ADMIN — MAGNESIUM HYDROXIDE 30 ML: 400 SUSPENSION ORAL at 10:38

## 2018-10-13 RX ADMIN — ENOXAPARIN SODIUM 40 MG: 40 INJECTION SUBCUTANEOUS at 20:32

## 2018-10-13 RX ADMIN — ONDANSETRON 4 MG: 2 INJECTION INTRAMUSCULAR; INTRAVENOUS at 12:43

## 2018-10-13 RX ADMIN — OMEPRAZOLE 40 MG: 20 CAPSULE, DELAYED RELEASE ORAL at 06:13

## 2018-10-13 ASSESSMENT — PAIN SCALES - GENERAL
PAINLEVEL_OUTOF10: 0
PAINLEVEL_OUTOF10: 0

## 2018-10-13 NOTE — PLAN OF CARE
Problem: Pain Control  Goal: Maintain pain level at or below patient's acceptable level (or 5 if patient is unable to determine acceptable level)  Outcome: Ongoing    Goal: Improvement in pain related behaviors BP/HR WNL  Outcome: Ongoing      Problem: Cardiovascular  Goal: No DVT, peripheral vascular complications  Outcome: Ongoing    Goal: Hemodynamic stability  Outcome: Ongoing    Goal: Anticoagulate/Hct stable  Outcome: Ongoing    Goal: Agreement to quit smoking  Outcome: Ongoing    Goal: Weight maintained or lost  Outcome: Ongoing    Goal: Understanding of dietary restrictions  Outcome: Ongoing

## 2018-10-14 VITALS
DIASTOLIC BLOOD PRESSURE: 72 MMHG | WEIGHT: 180 LBS | RESPIRATION RATE: 18 BRPM | HEIGHT: 67 IN | SYSTOLIC BLOOD PRESSURE: 134 MMHG | OXYGEN SATURATION: 97 % | TEMPERATURE: 98.4 F | BODY MASS INDEX: 28.25 KG/M2 | HEART RATE: 81 BPM

## 2018-10-14 PROBLEM — F41.9 ANXIETY: Status: ACTIVE | Noted: 2018-10-14

## 2018-10-14 LAB
EKG ATRIAL RATE: 72 BPM
EKG ATRIAL RATE: 78 BPM
EKG ATRIAL RATE: 78 BPM
EKG P AXIS: 29 DEGREES
EKG P AXIS: 38 DEGREES
EKG P AXIS: 44 DEGREES
EKG P-R INTERVAL: 160 MS
EKG P-R INTERVAL: 164 MS
EKG P-R INTERVAL: 164 MS
EKG Q-T INTERVAL: 372 MS
EKG Q-T INTERVAL: 380 MS
EKG Q-T INTERVAL: 392 MS
EKG QRS DURATION: 72 MS
EKG QRS DURATION: 72 MS
EKG QRS DURATION: 74 MS
EKG QTC CALCULATION (BAZETT): 416 MS
EKG QTC CALCULATION (BAZETT): 424 MS
EKG QTC CALCULATION (BAZETT): 446 MS
EKG R AXIS: 11 DEGREES
EKG R AXIS: 29 DEGREES
EKG R AXIS: 38 DEGREES
EKG T AXIS: 27 DEGREES
EKG T AXIS: 54 DEGREES
EKG T AXIS: 55 DEGREES
EKG VENTRICULAR RATE: 72 BPM
EKG VENTRICULAR RATE: 78 BPM
EKG VENTRICULAR RATE: 78 BPM

## 2018-10-14 PROCEDURE — G0378 HOSPITAL OBSERVATION PER HR: HCPCS

## 2018-10-14 PROCEDURE — 6370000000 HC RX 637 (ALT 250 FOR IP): Performed by: INTERNAL MEDICINE

## 2018-10-14 PROCEDURE — 93005 ELECTROCARDIOGRAM TRACING: CPT

## 2018-10-14 PROCEDURE — 90686 IIV4 VACC NO PRSV 0.5 ML IM: CPT | Performed by: INTERNAL MEDICINE

## 2018-10-14 PROCEDURE — G0008 ADMIN INFLUENZA VIRUS VAC: HCPCS | Performed by: INTERNAL MEDICINE

## 2018-10-14 PROCEDURE — 6360000002 HC RX W HCPCS: Performed by: INTERNAL MEDICINE

## 2018-10-14 RX ORDER — LISINOPRIL 20 MG/1
20 TABLET ORAL DAILY
Status: DISCONTINUED | OUTPATIENT
Start: 2018-10-15 | End: 2018-10-14 | Stop reason: HOSPADM

## 2018-10-14 RX ORDER — PSEUDOEPHEDRINE HCL 30 MG
100 TABLET ORAL 2 TIMES DAILY PRN
Qty: 60 CAPSULE | Refills: 5 | Status: SHIPPED | OUTPATIENT
Start: 2018-10-14 | End: 2019-05-11

## 2018-10-14 RX ORDER — ASPIRIN 81 MG/1
81 TABLET, CHEWABLE ORAL DAILY
Qty: 30 TABLET | Refills: 3 | Status: SHIPPED | OUTPATIENT
Start: 2018-10-15 | End: 2019-05-11

## 2018-10-14 RX ORDER — NITROGLYCERIN 0.4 MG/1
TABLET SUBLINGUAL
Qty: 25 TABLET | Refills: 3 | Status: SHIPPED | OUTPATIENT
Start: 2018-10-14 | End: 2019-05-11

## 2018-10-14 RX ORDER — LISINOPRIL 10 MG/1
20 TABLET ORAL 2 TIMES DAILY
Qty: 30 TABLET | Refills: 3 | Status: SHIPPED | OUTPATIENT
Start: 2018-10-14 | End: 2018-10-14 | Stop reason: HOSPADM

## 2018-10-14 RX ORDER — LISINOPRIL 20 MG/1
20 TABLET ORAL DAILY
Qty: 30 TABLET | Refills: 3 | Status: SHIPPED | OUTPATIENT
Start: 2018-10-15

## 2018-10-14 RX ORDER — ATORVASTATIN CALCIUM 20 MG/1
20 TABLET, FILM COATED ORAL NIGHTLY
Qty: 30 TABLET | Refills: 3 | Status: SHIPPED | OUTPATIENT
Start: 2018-10-14 | End: 2019-05-11

## 2018-10-14 RX ADMIN — OMEPRAZOLE 40 MG: 20 CAPSULE, DELAYED RELEASE ORAL at 05:35

## 2018-10-14 RX ADMIN — DOCUSATE SODIUM 100 MG: 100 CAPSULE, LIQUID FILLED ORAL at 08:06

## 2018-10-14 RX ADMIN — ASPIRIN 81 MG: 81 TABLET, CHEWABLE ORAL at 08:06

## 2018-10-14 RX ADMIN — MAGNESIUM HYDROXIDE 30 ML: 400 SUSPENSION ORAL at 08:06

## 2018-10-14 RX ADMIN — LISINOPRIL 10 MG: 10 TABLET ORAL at 08:08

## 2018-10-14 RX ADMIN — AMLODIPINE BESYLATE 2.5 MG: 2.5 TABLET ORAL at 08:08

## 2018-10-14 RX ADMIN — INFLUENZA A VIRUS A/MICHIGAN/45/2015 X-275 (H1N1) ANTIGEN (FORMALDEHYDE INACTIVATED), INFLUENZA A VIRUS A/SINGAPORE/INFIMH-16-0019/2016 IVR-186 (H3N2) ANTIGEN (FORMALDEHYDE INACTIVATED), INFLUENZA B VIRUS B/PHUKET/3073/2013 ANTIGEN (FORMALDEHYDE INACTIVATED), AND INFLUENZA B VIRUS B/MARYLAND/15/2016 BX-69A ANTIGEN (FORMALDEHYDE INACTIVATED) 0.5 ML: 15; 15; 15; 15 INJECTION, SUSPENSION INTRAMUSCULAR at 16:46

## 2018-10-14 ASSESSMENT — PAIN SCALES - GENERAL: PAINLEVEL_OUTOF10: 0

## 2018-10-14 NOTE — PROGRESS NOTES
[facility] Heart Progress Note      Patient: Josie Turner  Unit/Bed: V497/L747-47  YOB: 1935  MRN: [id number]  Admit Date:  10/12/2018  Hospital Day: 0    Subjective Complaint:   Denies any chest pain with exertion or at rest, palpitations, syncope, or edema., Sx's seem purely GI at this time---Nausea and constipation. Physical Examination:     BP (!) 147/71   Pulse 82   Temp 97.9 °F (36.6 °C) (Oral)   Resp 19   Ht 5' 6.5\" (1.689 m)   Wt 180 lb (81.6 kg)   SpO2 97%   BMI 28.62 kg/m²       Intake/Output Summary (Last 24 hours) at 10/13/18 0914  Last data filed at 10/13/18 0749   Gross per 24 hour   Intake              540 ml   Output                0 ml   Net              540 ml                  Josie Turner examined at bedside in in no apparent distress, cooperative and well hydrated. Focused exam reveals:     Cardiac: Heart regular rate and rhythm     Lungs:  clear to auscultation bilaterally- no wheezes, rales or rhonchi, normal air movement, no respiratory distress    Extremities:   negative    Telemetry:      normal sinus          LABS:   CBC:   Recent Labs      10/12/18   0913  10/13/18   0604   WBC  6.3  4.1*   HGB  14.2  13.1   PLT  191  183      BMP:  Recent Labs      10/12/18   0912   NA  139   K  3.7   CL  99   CO2  28   BUN  6*   CREATININE  0.40*   GLUCOSE  105              Troponin: No results for input(s): TROPONINT in the last 72 hours. BNP:   Recent Labs      10/12/18   1325   PROBNP  96      INR:   Recent Labs      10/12/18   0912  10/13/18   0604   INR  1.0  1.1      Mg:   Recent Labs      10/13/18   0604   MG  2.0       Cardiac Testing:    none    Assessment:    No evidence of ischemia but GI sx    Plan:  1. Will get echo  2. Will consult GI  3.  See orders  Electronically signed by Milena Lay MD on 10/13/2018 at 9:14 AM
ventricle wall thickness.   Mildly enlarged right ventricle cavity.   Right ventricle global systolic function is normal .   trivial circumferential pericardial effusion with tamponade physiology.   no previous exam in which to compare    Assessment:  Pt feels much better after bowel movement  ekg and enzymes are negative  Anxiety    Plan:  1. Probable ok to d/c if ok with GI   2. Follow up with primary and dr Beckie Hodgson  3.  See orders  Electronically signed by James Tenorio MD on 10/14/2018 at 11:21 AM

## 2018-10-14 NOTE — CONSULTS
Consults  Patient Name: Hai Vance  Admit Date: 10/12/2018  8:43 AM  MR #: [id number]  : 1935    Attending Physician: Renée De Souza MD  Reason for consult: Nausea, Vomiting, constipation     History of Presenting Illness:      Hai Vance is a 82 y.o. female on hospital day 0 with a history of Anxiety, Hernia, HTN. History Obtained From:  patient  Patient reports nausea for about one week. She reports one episode of emesis that was bile. She reports dealing with constipation and has a bowel movement every three days. She reports taking MOM to have a bowel movement. She reports straining. She denies hematochezia or melena. She denies hematemesis. She denies abdominal pain. Patient reports undergoing \"stomach\" surgery in  at the [facility] [place]. Patient reports her nausea has completely resolved after having a bowel movement. She is tolerating a general diet without any complaints at this time. Patient's family is at the bedside. Patient denies any unintentional weight loss or loss of appetite. Patient reports increased anxiety due to a recent family death. Patient is requesting Ativan for home. Daughter reports she will follow-up with her PCP to discuss this. Patient denies fever or chills. She denies CP, SOB, or palpitations. History:      Past Medical History:   Diagnosis Date    Anxiety     Hernia of abdominal cavity     Hypertension      Past Surgical History:   Procedure Laterality Date    ESOPHAGUS SURGERY      FLIPPED STOMACH     Family History  History reviewed. No pertinent family history. Social History     Social History    Marital status: Single     Spouse name: N/A    Number of children: N/A    Years of education: N/A     Occupational History    Not on file.      Social History Main Topics    Smoking status: Never Smoker    Smokeless tobacco: Never Used    Alcohol use No    Drug use: No    Sexual activity: Not on file     Other Topics Concern    Not on file

## 2018-10-15 PROCEDURE — 93010 ELECTROCARDIOGRAM REPORT: CPT | Performed by: INTERNAL MEDICINE

## 2018-10-15 NOTE — DISCHARGE SUMMARY
[address]                     [address], [address] [facility]                              DISCHARGE SUMMARY    PATIENT NAME: Faraz Roche                    :         1935  MED REC NO:   [id number]                            ROOM:       P431  ACCOUNT NO:   [de-identified]                           ADMIT DATE:  10/12/2018  PROVIDER:     Daniel Jo MD              [facility] DATE:  10/14/2018    The patient presented with vague abdominal and chest discomfort. There was no objective evidence for this being related to her heart. The patient had a \"mild ileus. \"  The patient in general received an  enema, had a bowel movement, and actually felt much better. Her  echocardiogram, enzymes, and EKG were all unremarkable. GI was  consulted. However, the patient actually did well. From a  cardiovascular standpoint, we _____ her to be discharged once  evaluated by the GI service and see if any followup is necessary. Her discharge medicines included a baby aspirin a day, Lipitor 20 mg a  day, Colace 100 mg p.o. b.i.d., nitroglycerin p.r.n., Norvasc 2.5 mg a  day, and lisinopril 10. The patient also has a little bit of anxiety, and for that reason, the  patient may need some Ativan. However, we will leave that up to her  primary service Dr. Clinton Olszewski to perform that. At the time of this  dictation, the patient will be discharged home and will follow up with  Dr. Renzo Proctor pending GI evaluation. We will leave the Prilosec up to the  GI consultant.         Kisha Hoffman MD    D: 10/14/2018 11:46:28       T: 10/15/2018 9:44:56     [email or web address]  Job#: [id number]     Doc#: [id number]    CC:

## 2019-05-11 ENCOUNTER — APPOINTMENT (OUTPATIENT)
Dept: CT IMAGING | Age: 84
End: 2019-05-11
Payer: MEDICARE

## 2019-05-11 ENCOUNTER — HOSPITAL ENCOUNTER (EMERGENCY)
Age: 84
Discharge: HOME | End: 2019-05-11
Payer: MEDICARE

## 2019-05-11 ENCOUNTER — APPOINTMENT (OUTPATIENT)
Dept: GENERAL RADIOLOGY | Age: 84
End: 2019-05-11
Payer: MEDICARE

## 2019-05-11 ENCOUNTER — HOSPITAL ENCOUNTER (OUTPATIENT)
Age: 84
Setting detail: OBSERVATION
Discharge: HOME | End: 2019-05-13
Attending: EMERGENCY MEDICINE | Admitting: INTERNAL MEDICINE
Payer: MEDICARE

## 2019-05-11 VITALS
HEART RATE: 88 BPM | BODY MASS INDEX: 29.05 KG/M2 | DIASTOLIC BLOOD PRESSURE: 85 MMHG | HEIGHT: 66 IN | TEMPERATURE: 97.8 F | RESPIRATION RATE: 16 BRPM | OXYGEN SATURATION: 98 % | SYSTOLIC BLOOD PRESSURE: 160 MMHG

## 2019-05-11 DIAGNOSIS — R11.11 NON-INTRACTABLE VOMITING WITHOUT NAUSEA, UNSPECIFIED VOMITING TYPE: Primary | ICD-10-CM

## 2019-05-11 DIAGNOSIS — R11.2 NON-INTRACTABLE VOMITING WITH NAUSEA, UNSPECIFIED VOMITING TYPE: Primary | ICD-10-CM

## 2019-05-11 DIAGNOSIS — N30.01 ACUTE CYSTITIS WITH HEMATURIA: ICD-10-CM

## 2019-05-11 LAB
ALBUMIN SERPL-MCNC: 3.9 G/DL (ref 3.5–4.6)
ALBUMIN SERPL-MCNC: 4 G/DL (ref 3.5–4.6)
ALP BLD-CCNC: 83 U/L (ref 40–130)
ALP BLD-CCNC: 87 U/L (ref 40–130)
ALT SERPL-CCNC: 6 U/L (ref 0–33)
ALT SERPL-CCNC: 7 U/L (ref 0–33)
ANION GAP SERPL CALCULATED.3IONS-SCNC: 14 MEQ/L (ref 9–15)
ANION GAP SERPL CALCULATED.3IONS-SCNC: 17 MEQ/L (ref 9–15)
AST SERPL-CCNC: 12 U/L (ref 0–35)
AST SERPL-CCNC: 13 U/L (ref 0–35)
BACTERIA: NEGATIVE /HPF
BACTERIA: NEGATIVE /HPF
BASOPHILS ABSOLUTE: 0 K/UL (ref 0–0.2)
BASOPHILS ABSOLUTE: 0.1 K/UL (ref 0–0.2)
BASOPHILS RELATIVE PERCENT: 0.3 %
BASOPHILS RELATIVE PERCENT: 0.5 %
BILIRUB SERPL-MCNC: 0.4 MG/DL (ref 0.2–0.7)
BILIRUB SERPL-MCNC: 0.4 MG/DL (ref 0.2–0.7)
BILIRUBIN URINE: NEGATIVE
BILIRUBIN URINE: NEGATIVE
BLOOD, URINE: ABNORMAL
BLOOD, URINE: NEGATIVE
BUN BLDV-MCNC: 15 MG/DL (ref 8–23)
BUN BLDV-MCNC: 17 MG/DL (ref 8–23)
CALCIUM SERPL-MCNC: 8.8 MG/DL (ref 8.5–9.9)
CALCIUM SERPL-MCNC: 8.9 MG/DL (ref 8.5–9.9)
CHLORIDE BLD-SCNC: 102 MEQ/L (ref 95–107)
CHLORIDE BLD-SCNC: 98 MEQ/L (ref 95–107)
CLARITY: CLEAR
CLARITY: CLEAR
CO2: 19 MEQ/L (ref 20–31)
CO2: 24 MEQ/L (ref 20–31)
COLOR: YELLOW
COLOR: YELLOW
CREAT SERPL-MCNC: 0.48 MG/DL (ref 0.5–0.9)
CREAT SERPL-MCNC: 0.49 MG/DL (ref 0.5–0.9)
EKG ATRIAL RATE: 75 BPM
EKG ATRIAL RATE: 93 BPM
EKG P AXIS: 44 DEGREES
EKG P AXIS: 45 DEGREES
EKG P-R INTERVAL: 170 MS
EKG P-R INTERVAL: 178 MS
EKG Q-T INTERVAL: 348 MS
EKG Q-T INTERVAL: 384 MS
EKG QRS DURATION: 72 MS
EKG QRS DURATION: 74 MS
EKG QTC CALCULATION (BAZETT): 428 MS
EKG QTC CALCULATION (BAZETT): 432 MS
EKG R AXIS: 26 DEGREES
EKG R AXIS: 27 DEGREES
EKG T AXIS: 42 DEGREES
EKG T AXIS: 50 DEGREES
EKG VENTRICULAR RATE: 75 BPM
EKG VENTRICULAR RATE: 93 BPM
EOSINOPHILS ABSOLUTE: 0 K/UL (ref 0–0.7)
EOSINOPHILS ABSOLUTE: 0 K/UL (ref 0–0.7)
EOSINOPHILS RELATIVE PERCENT: 0.1 %
EOSINOPHILS RELATIVE PERCENT: 0.3 %
EPITHELIAL CELLS, UA: ABNORMAL /HPF (ref 0–5)
EPITHELIAL CELLS, UA: NORMAL /HPF (ref 0–5)
GFR AFRICAN AMERICAN: >60
GFR NON-AFRICAN AMERICAN: >60
GLOBULIN: 2.8 G/DL (ref 2.3–3.5)
GLOBULIN: 2.9 G/DL (ref 2.3–3.5)
GLUCOSE BLD-MCNC: 131 MG/DL (ref 70–99)
GLUCOSE BLD-MCNC: 135 MG/DL (ref 70–99)
GLUCOSE URINE: NEGATIVE MG/DL
GLUCOSE URINE: NEGATIVE MG/DL
HCT VFR BLD CALC: 38 % (ref 37–47)
HCT VFR BLD CALC: 38.3 % (ref 37–47)
HEMOGLOBIN: 13.1 G/DL (ref 12–16)
HEMOGLOBIN: 13.2 G/DL (ref 12–16)
HYALINE CASTS: ABNORMAL /HPF (ref 0–5)
HYALINE CASTS: NORMAL /HPF (ref 0–5)
KETONES, URINE: NEGATIVE MG/DL
KETONES, URINE: NEGATIVE MG/DL
LACTIC ACID: 1.5 MMOL/L (ref 0.5–2.2)
LEUKOCYTE ESTERASE, URINE: ABNORMAL
LEUKOCYTE ESTERASE, URINE: ABNORMAL
LIPASE: 12 U/L (ref 12–95)
LYMPHOCYTES ABSOLUTE: 0.6 K/UL (ref 1–4.8)
LYMPHOCYTES ABSOLUTE: 1.3 K/UL (ref 1–4.8)
LYMPHOCYTES RELATIVE PERCENT: 12.2 %
LYMPHOCYTES RELATIVE PERCENT: 5.9 %
MCH RBC QN AUTO: 32.7 PG (ref 27–31.3)
MCH RBC QN AUTO: 32.8 PG (ref 27–31.3)
MCHC RBC AUTO-ENTMCNC: 34.5 % (ref 33–37)
MCHC RBC AUTO-ENTMCNC: 34.5 % (ref 33–37)
MCV RBC AUTO: 94.8 FL (ref 82–100)
MCV RBC AUTO: 95.1 FL (ref 82–100)
MONOCYTES ABSOLUTE: 0.3 K/UL (ref 0.2–0.8)
MONOCYTES ABSOLUTE: 0.5 K/UL (ref 0.2–0.8)
MONOCYTES RELATIVE PERCENT: 3 %
MONOCYTES RELATIVE PERCENT: 4.6 %
NEUTROPHILS ABSOLUTE: 8.5 K/UL (ref 1.4–6.5)
NEUTROPHILS ABSOLUTE: 9.4 K/UL (ref 1.4–6.5)
NEUTROPHILS RELATIVE PERCENT: 82.6 %
NEUTROPHILS RELATIVE PERCENT: 90.5 %
NITRITE, URINE: NEGATIVE
NITRITE, URINE: NEGATIVE
PDW BLD-RTO: 13.5 % (ref 11.5–14.5)
PDW BLD-RTO: 13.7 % (ref 11.5–14.5)
PERFORMED ON: NORMAL
PH UA: 6 (ref 5–9)
PH UA: 7 (ref 5–9)
PLATELET # BLD: 171 K/UL (ref 130–400)
PLATELET # BLD: 179 K/UL (ref 130–400)
POC CREATININE WHOLE BLOOD: 0.6
POC CREATININE: 0.6 MG/DL (ref 0.6–1.2)
POC SAMPLE TYPE: NORMAL
POTASSIUM SERPL-SCNC: 3.4 MEQ/L (ref 3.4–4.9)
POTASSIUM SERPL-SCNC: 3.5 MEQ/L (ref 3.4–4.9)
PROTEIN UA: NEGATIVE MG/DL
PROTEIN UA: NEGATIVE MG/DL
RBC # BLD: 4 M/UL (ref 4.2–5.4)
RBC # BLD: 4.04 M/UL (ref 4.2–5.4)
RBC UA: ABNORMAL /HPF (ref 0–5)
RBC UA: NORMAL /HPF (ref 0–5)
SODIUM BLD-SCNC: 134 MEQ/L (ref 135–144)
SODIUM BLD-SCNC: 140 MEQ/L (ref 135–144)
SPECIFIC GRAVITY UA: 1.01 (ref 1–1.03)
SPECIFIC GRAVITY UA: 1.04 (ref 1–1.03)
TOTAL PROTEIN: 6.7 G/DL (ref 6.3–8)
TOTAL PROTEIN: 6.9 G/DL (ref 6.3–8)
TROPONIN: <0.01 NG/ML (ref 0–0.01)
URINE REFLEX TO CULTURE: YES
URINE REFLEX TO CULTURE: YES
UROBILINOGEN, URINE: 0.2 E.U./DL
UROBILINOGEN, URINE: 0.2 E.U./DL
WBC # BLD: 10.3 K/UL (ref 4.8–10.8)
WBC # BLD: 10.4 K/UL (ref 4.8–10.8)
WBC UA: ABNORMAL /HPF (ref 0–5)
WBC UA: NORMAL /HPF (ref 0–5)

## 2019-05-11 PROCEDURE — 93005 ELECTROCARDIOGRAM TRACING: CPT

## 2019-05-11 PROCEDURE — 6360000002 HC RX W HCPCS: Performed by: NURSE PRACTITIONER

## 2019-05-11 PROCEDURE — 87086 URINE CULTURE/COLONY COUNT: CPT

## 2019-05-11 PROCEDURE — G0378 HOSPITAL OBSERVATION PER HR: HCPCS

## 2019-05-11 PROCEDURE — 96374 THER/PROPH/DIAG INJ IV PUSH: CPT

## 2019-05-11 PROCEDURE — 99285 EMERGENCY DEPT VISIT HI MDM: CPT

## 2019-05-11 PROCEDURE — 85025 COMPLETE CBC W/AUTO DIFF WBC: CPT

## 2019-05-11 PROCEDURE — 2580000003 HC RX 258: Performed by: EMERGENCY MEDICINE

## 2019-05-11 PROCEDURE — 83690 ASSAY OF LIPASE: CPT

## 2019-05-11 PROCEDURE — 81001 URINALYSIS AUTO W/SCOPE: CPT

## 2019-05-11 PROCEDURE — 96376 TX/PRO/DX INJ SAME DRUG ADON: CPT

## 2019-05-11 PROCEDURE — 2580000003 HC RX 258: Performed by: NURSE PRACTITIONER

## 2019-05-11 PROCEDURE — 6360000004 HC RX CONTRAST MEDICATION: Performed by: NURSE PRACTITIONER

## 2019-05-11 PROCEDURE — 96372 THER/PROPH/DIAG INJ SC/IM: CPT

## 2019-05-11 PROCEDURE — 80053 COMPREHEN METABOLIC PANEL: CPT

## 2019-05-11 PROCEDURE — 96375 TX/PRO/DX INJ NEW DRUG ADDON: CPT

## 2019-05-11 PROCEDURE — 74177 CT ABD & PELVIS W/CONTRAST: CPT

## 2019-05-11 PROCEDURE — 6360000002 HC RX W HCPCS: Performed by: EMERGENCY MEDICINE

## 2019-05-11 PROCEDURE — 83605 ASSAY OF LACTIC ACID: CPT

## 2019-05-11 PROCEDURE — 96365 THER/PROPH/DIAG IV INF INIT: CPT

## 2019-05-11 PROCEDURE — 99284 EMERGENCY DEPT VISIT MOD MDM: CPT

## 2019-05-11 PROCEDURE — 84484 ASSAY OF TROPONIN QUANT: CPT

## 2019-05-11 PROCEDURE — 74018 RADEX ABDOMEN 1 VIEW: CPT

## 2019-05-11 PROCEDURE — 36415 COLL VENOUS BLD VENIPUNCTURE: CPT

## 2019-05-11 RX ORDER — 0.9 % SODIUM CHLORIDE 0.9 %
1000 INTRAVENOUS SOLUTION INTRAVENOUS ONCE
Status: DISCONTINUED | OUTPATIENT
Start: 2019-05-11 | End: 2019-05-13 | Stop reason: HOSPADM

## 2019-05-11 RX ORDER — PROMETHAZINE HYDROCHLORIDE 25 MG/ML
25 INJECTION, SOLUTION INTRAMUSCULAR; INTRAVENOUS ONCE
Status: COMPLETED | OUTPATIENT
Start: 2019-05-11 | End: 2019-05-11

## 2019-05-11 RX ORDER — AMLODIPINE BESYLATE 2.5 MG/1
2.5 TABLET ORAL DAILY
COMMUNITY

## 2019-05-11 RX ORDER — ONDANSETRON 2 MG/ML
4 INJECTION INTRAMUSCULAR; INTRAVENOUS ONCE
Status: COMPLETED | OUTPATIENT
Start: 2019-05-11 | End: 2019-05-11

## 2019-05-11 RX ORDER — ONDANSETRON 4 MG/1
4-8 TABLET, ORALLY DISINTEGRATING ORAL EVERY 12 HOURS PRN
Qty: 12 TABLET | Refills: 0 | Status: ON HOLD | OUTPATIENT
Start: 2019-05-11 | End: 2019-05-13 | Stop reason: SDUPTHER

## 2019-05-11 RX ORDER — ONDANSETRON 2 MG/ML
4 INJECTION INTRAMUSCULAR; INTRAVENOUS EVERY 10 MIN PRN
Status: DISCONTINUED | OUTPATIENT
Start: 2019-05-11 | End: 2019-05-11 | Stop reason: HOSPADM

## 2019-05-11 RX ORDER — SODIUM PHOSPHATE, DIBASIC AND SODIUM PHOSPHATE, MONOBASIC 7; 19 G/133ML; G/133ML
1 ENEMA RECTAL
Status: DISPENSED | OUTPATIENT
Start: 2019-05-12 | End: 2019-05-12

## 2019-05-11 RX ORDER — LORAZEPAM 0.5 MG/1
0.5 TABLET ORAL EVERY 6 HOURS PRN
COMMUNITY
End: 2020-06-25

## 2019-05-11 RX ORDER — SULFAMETHOXAZOLE AND TRIMETHOPRIM 800; 160 MG/1; MG/1
1 TABLET ORAL 2 TIMES DAILY
Qty: 14 TABLET | Refills: 0 | Status: ON HOLD | OUTPATIENT
Start: 2019-05-11 | End: 2019-05-13 | Stop reason: HOSPADM

## 2019-05-11 RX ORDER — 0.9 % SODIUM CHLORIDE 0.9 %
1000 INTRAVENOUS SOLUTION INTRAVENOUS ONCE
Status: COMPLETED | OUTPATIENT
Start: 2019-05-11 | End: 2019-05-12

## 2019-05-11 RX ORDER — 0.9 % SODIUM CHLORIDE 0.9 %
500 INTRAVENOUS SOLUTION INTRAVENOUS ONCE
Status: COMPLETED | OUTPATIENT
Start: 2019-05-11 | End: 2019-05-11

## 2019-05-11 RX ORDER — SODIUM CHLORIDE 9 MG/ML
INJECTION, SOLUTION INTRAVENOUS CONTINUOUS
Status: DISCONTINUED | OUTPATIENT
Start: 2019-05-11 | End: 2019-05-13 | Stop reason: HOSPADM

## 2019-05-11 RX ORDER — SODIUM CHLORIDE 0.9 % (FLUSH) 0.9 %
10 SYRINGE (ML) INJECTION PRN
Status: DISCONTINUED | OUTPATIENT
Start: 2019-05-11 | End: 2019-05-13 | Stop reason: HOSPADM

## 2019-05-11 RX ORDER — POLYETHYLENE GLYCOL 3350 17 G/17G
17 POWDER, FOR SOLUTION ORAL DAILY PRN
Status: DISCONTINUED | OUTPATIENT
Start: 2019-05-11 | End: 2019-05-13 | Stop reason: HOSPADM

## 2019-05-11 RX ORDER — SODIUM CHLORIDE 0.9 % (FLUSH) 0.9 %
10 SYRINGE (ML) INJECTION PRN
Status: DISCONTINUED | OUTPATIENT
Start: 2019-05-11 | End: 2019-05-11 | Stop reason: HOSPADM

## 2019-05-11 RX ORDER — SODIUM CHLORIDE 0.9 % (FLUSH) 0.9 %
10 SYRINGE (ML) INJECTION EVERY 12 HOURS SCHEDULED
Status: DISCONTINUED | OUTPATIENT
Start: 2019-05-11 | End: 2019-05-13 | Stop reason: HOSPADM

## 2019-05-11 RX ORDER — ONDANSETRON 2 MG/ML
4 INJECTION INTRAMUSCULAR; INTRAVENOUS EVERY 6 HOURS PRN
Status: DISCONTINUED | OUTPATIENT
Start: 2019-05-11 | End: 2019-05-13 | Stop reason: HOSPADM

## 2019-05-11 RX ADMIN — ONDANSETRON 4 MG: 2 INJECTION INTRAMUSCULAR; INTRAVENOUS at 07:37

## 2019-05-11 RX ADMIN — PROMETHAZINE HYDROCHLORIDE 25 MG: 25 INJECTION, SOLUTION INTRAMUSCULAR; INTRAVENOUS at 08:15

## 2019-05-11 RX ADMIN — SODIUM CHLORIDE 500 ML: 9 INJECTION, SOLUTION INTRAVENOUS at 05:43

## 2019-05-11 RX ADMIN — ONDANSETRON 4 MG: 2 INJECTION INTRAMUSCULAR; INTRAVENOUS at 23:50

## 2019-05-11 RX ADMIN — Medication 10 ML: at 06:30

## 2019-05-11 RX ADMIN — ONDANSETRON 4 MG: 2 INJECTION INTRAMUSCULAR; INTRAVENOUS at 20:46

## 2019-05-11 RX ADMIN — SODIUM CHLORIDE: 9 INJECTION, SOLUTION INTRAVENOUS at 23:50

## 2019-05-11 RX ADMIN — Medication 10 ML: at 23:53

## 2019-05-11 RX ADMIN — CEFTRIAXONE SODIUM 1 G: 1 INJECTION, POWDER, FOR SOLUTION INTRAMUSCULAR; INTRAVENOUS at 07:38

## 2019-05-11 RX ADMIN — ONDANSETRON 4 MG: 2 INJECTION INTRAMUSCULAR; INTRAVENOUS at 05:43

## 2019-05-11 RX ADMIN — IOPAMIDOL 100 ML: 755 INJECTION, SOLUTION INTRAVENOUS at 06:30

## 2019-05-11 RX ADMIN — SODIUM CHLORIDE 1000 ML: 9 INJECTION, SOLUTION INTRAVENOUS at 20:46

## 2019-05-11 ASSESSMENT — ENCOUNTER SYMPTOMS
SORE THROAT: 0
CHEST TIGHTNESS: 0
ANAL BLEEDING: 0
DIARRHEA: 0
EYE PAIN: 0
WHEEZING: 0
NAUSEA: 1
CHOKING: 0
RHINORRHEA: 0
BACK PAIN: 0
EYE ITCHING: 0
VOMITING: 1
ABDOMINAL PAIN: 0
VOMITING: 1
PHOTOPHOBIA: 0
BACK PAIN: 0
EYE REDNESS: 0
STRIDOR: 0
SHORTNESS OF BREATH: 0
COUGH: 0
SHORTNESS OF BREATH: 0
EYE DISCHARGE: 0
PHOTOPHOBIA: 0
EYE PAIN: 0
NAUSEA: 1
VOICE CHANGE: 0
ABDOMINAL DISTENTION: 0
SINUS PRESSURE: 0
TROUBLE SWALLOWING: 0
ABDOMINAL PAIN: 0
COLOR CHANGE: 0
BLOOD IN STOOL: 0
SORE THROAT: 0
CONSTIPATION: 0
FACIAL SWELLING: 0
RHINORRHEA: 0
COUGH: 0
DIARRHEA: 1

## 2019-05-11 ASSESSMENT — PAIN DESCRIPTION - LOCATION: LOCATION: HIP;GROIN

## 2019-05-11 ASSESSMENT — PAIN DESCRIPTION - ORIENTATION: ORIENTATION: RIGHT

## 2019-05-11 ASSESSMENT — PAIN SCALES - GENERAL: PAINLEVEL_OUTOF10: 8

## 2019-05-11 NOTE — ED NOTES
Pt up to bedside commode. Pt steady on feet transfers independantly. Pt awake alert color pink skin warm and dry. Pt not nauseated.       Sonny Gregg RN  05/11/19 0141

## 2019-05-11 NOTE — ED PROVIDER NOTES
[facility] ED  eMERGENCY dEPARTMENT eNCOUnter      Pt Name: Tanika Figueroa  MRN: [id number]  [place]ftheo 1935  Date of evaluation: 5/11/2019  Provider: GIBRAN Groves [id number]       Chief Complaint   Patient presents with    Emesis         HISTORY OF PRESENT ILLNESS   (Location/Symptom, Timing/Onset,Context/Setting, Quality, Duration, Modifying Factors, Severity)  Note limiting factors. Tanika Figueroa is a 82 y.o. female who presents to the emergency department with complaints of nausea and vomiting for the past 2 hours. She admits to taking zofran without relief of her symptoms. She denies any associated abdominal pain. She admits to concern for her ventral hernia. This was repair years ago, she was told to be mindful of vomiting after the surgery. She does report 2-3 days worth of diarrhea that resolved Wednesday this week. No fever, change in appetite, oral intake. Location/Symptom - n/v  Timing/Onset - 2 horus  Context/Setting - as above  Quality - nausea  Duration - 2 hours  Modifying Factors - none, no relief with zofran odt. Severity - moderate    Nursing Notes were reviewed. REVIEW OF SYSTEMS    (2-9 systems for level 4, 10 or more for level 5)     Review of Systems   Constitutional: Negative for chills, diaphoresis, fatigue and fever. HENT: Negative for congestion, rhinorrhea and sore throat. Eyes: Negative for photophobia and pain. Respiratory: Negative for cough and shortness of breath. Cardiovascular: Negative for chest pain and palpitations. Gastrointestinal: Positive for diarrhea (recent but resolved), nausea and vomiting. Negative for abdominal pain. Genitourinary: Negative for dysuria and flank pain. Musculoskeletal: Negative for back pain. Skin: Negative for rash. Neurological: Negative for dizziness, light-headedness and headaches. Psychiatric/Behavioral: Negative.     All other systems reviewed and are negative. Except as noted above the remainder of the review of systems was reviewed and negative. PAST MEDICAL HISTORY     Past Medical History:   Diagnosis Date    Anxiety     Hernia of abdominal cavity     Hypertension      Past Surgical History:   Procedure Laterality Date    ESOPHAGUS SURGERY      FLIPPED STOMACH     Social History     Socioeconomic History    Marital status: Single     Spouse name: None    Number of children: None    Years of education: None    Highest education level: None   Occupational History    None   Social Needs    Financial resource strain: None    Food insecurity:     Worry: None     Inability: None    Transportation needs:     Medical: None     Non-medical: None   Tobacco Use    Smoking status: Never Smoker    Smokeless tobacco: Never Used   Substance and Sexual Activity    Alcohol use: No    Drug use: No    Sexual activity: None   Lifestyle    Physical activity:     Days per week: None     Minutes per session: None    Stress: None   Relationships    Social connections:     Talks on phone: None     Gets together: None     Attends Spiritism service: None     Active member of club or organization: None     Attends meetings of clubs or organizations: None     Relationship status: None    Intimate partner violence:     Fear of current or ex partner: None     Emotionally abused: None     Physically abused: None     Forced sexual activity: None   Other Topics Concern    None   Social History Narrative    None       SCREENINGS      @FLOW([id number])@      PHYSICAL EXAM    (up to 7 for level 4, 8 or more for level 5)     ED Triage Vitals [05/11/19 0530]   BP Temp Temp Source Pulse Resp SpO2 Height Weight   (!) 159/68 97.8 °F (36.6 °C) Oral 72 18 97 % 5' 6\" (1.676 m) --       Physical Exam   Constitutional: She is oriented to person, place, and time. She appears well-developed and well-nourished. She is active. No distress.    HENT:   Head: Normocephalic and # 8.5 (*)     All other components within normal limits   URINE RT REFLEX TO CULTURE - Abnormal; Notable for the following components:    Blood, Urine TRACE (*)     Leukocyte Esterase, Urine MODERATE (*)     All other components within normal limits   MICROSCOPIC URINALYSIS - Abnormal; Notable for the following components:    WBC, UA 20-50 (*)     RBC, UA 6-10 (*)     All other components within normal limits   POCT CREATININE - Normal   URINE CULTURE   LACTIC ACID, PLASMA   LIPASE       All other labs were within normal range or not returned as of this dictation. EMERGENCY DEPARTMENT COURSE and DIFFERENTIAL DIAGNOSIS/MDM:   Vitals:    Vitals:    05/11/19 0530 05/11/19 0651   BP: (!) 159/68 124/77   Pulse: 72 83   Resp: 18 18   Temp: 97.8 °F (36.6 °C) 97.8 °F (36.6 °C)   TempSrc: Oral Oral   SpO2: 97% 98%   Height: 5' 6\" (1.676 m)             MDM patient is reexamined following administration of IV fluid and Zofran. She reports that she feels significantly improved. She continues to have no pain. Abdomen remains soft and nontender without rebound rigidity or guarding. She'll be reevaluated. Laboratory studies are reviewed. No acute pathology identified. Patient again reexamined her abdomen remains soft and nontender with good bowel sounds. No rebound rigidity or guarding. She continues to have no pain. We begin to discuss plan of care, the patient definitely wants to go home. CT scan of the abdomen and pelvis does not demonstrate acute pathology. Patient's abdomen is reexamined and unchanged. There continues to be no rebound rigidity or guarding or peritoneal signs. She continues to report that she feels significantly improved. Urinalysis did demonstrate findings of cystitis. She'll be provided antibiotic for this. We again discussed plan of care. She was offered observation/admission to the hospital for further evaluation and management.   She refuses and reports that she feels significantly

## 2019-05-11 NOTE — ED NOTES
Bed: 05  Expected date:   Expected time:   Means of arrival:   Comments:  84yr female - n/v      Ramesh Smiley RN  05/11/19 9696

## 2019-05-11 NOTE — ED TRIAGE NOTES
Patient to ED via [facility] with c/o emesis. Onset 2 hours. Patient states she woke up to use the restroom feeling ok and got back into bed and started to \"dry heave\". Patient denies pain or diarrhea. Patient does c/o nausea. Skin pink warm and dry, even resp.

## 2019-05-12 LAB
ANION GAP SERPL CALCULATED.3IONS-SCNC: 16 MEQ/L (ref 9–15)
BUN BLDV-MCNC: 10 MG/DL (ref 8–23)
CALCIUM SERPL-MCNC: 8.6 MG/DL (ref 8.5–9.9)
CHLORIDE BLD-SCNC: 102 MEQ/L (ref 95–107)
CO2: 22 MEQ/L (ref 20–31)
CREAT SERPL-MCNC: 0.45 MG/DL (ref 0.5–0.9)
GFR AFRICAN AMERICAN: >60
GFR NON-AFRICAN AMERICAN: >60
GLUCOSE BLD-MCNC: 127 MG/DL (ref 70–99)
HCT VFR BLD CALC: 38.1 % (ref 37–47)
HEMOGLOBIN: 13.1 G/DL (ref 12–16)
MAGNESIUM: 1.8 MG/DL (ref 1.7–2.4)
MCH RBC QN AUTO: 32.9 PG (ref 27–31.3)
MCHC RBC AUTO-ENTMCNC: 34.3 % (ref 33–37)
MCV RBC AUTO: 95.8 FL (ref 82–100)
PDW BLD-RTO: 14 % (ref 11.5–14.5)
PLATELET # BLD: 168 K/UL (ref 130–400)
POTASSIUM REFLEX MAGNESIUM: 3 MEQ/L (ref 3.4–4.9)
RBC # BLD: 3.97 M/UL (ref 4.2–5.4)
SODIUM BLD-SCNC: 140 MEQ/L (ref 135–144)
URINE CULTURE, ROUTINE: NORMAL
WBC # BLD: 7.5 K/UL (ref 4.8–10.8)

## 2019-05-12 PROCEDURE — 6360000002 HC RX W HCPCS: Performed by: NURSE PRACTITIONER

## 2019-05-12 PROCEDURE — 85027 COMPLETE CBC AUTOMATED: CPT

## 2019-05-12 PROCEDURE — 83735 ASSAY OF MAGNESIUM: CPT

## 2019-05-12 PROCEDURE — 96376 TX/PRO/DX INJ SAME DRUG ADON: CPT

## 2019-05-12 PROCEDURE — 2580000003 HC RX 258: Performed by: INTERNAL MEDICINE

## 2019-05-12 PROCEDURE — 36415 COLL VENOUS BLD VENIPUNCTURE: CPT

## 2019-05-12 PROCEDURE — 6370000000 HC RX 637 (ALT 250 FOR IP): Performed by: INTERNAL MEDICINE

## 2019-05-12 PROCEDURE — G0378 HOSPITAL OBSERVATION PER HR: HCPCS

## 2019-05-12 PROCEDURE — 96372 THER/PROPH/DIAG INJ SC/IM: CPT

## 2019-05-12 PROCEDURE — 80048 BASIC METABOLIC PNL TOTAL CA: CPT

## 2019-05-12 RX ORDER — AMLODIPINE BESYLATE 2.5 MG/1
2.5 TABLET ORAL DAILY
Status: DISCONTINUED | OUTPATIENT
Start: 2019-05-12 | End: 2019-05-13 | Stop reason: HOSPADM

## 2019-05-12 RX ORDER — SULFAMETHOXAZOLE AND TRIMETHOPRIM 800; 160 MG/1; MG/1
1 TABLET ORAL 2 TIMES DAILY
Status: DISCONTINUED | OUTPATIENT
Start: 2019-05-12 | End: 2019-05-12

## 2019-05-12 RX ORDER — POTASSIUM CHLORIDE 7.45 MG/ML
10 INJECTION INTRAVENOUS ONCE
Status: DISCONTINUED | OUTPATIENT
Start: 2019-05-12 | End: 2019-05-12

## 2019-05-12 RX ORDER — LISINOPRIL 10 MG/1
10 TABLET ORAL DAILY
Status: DISCONTINUED | OUTPATIENT
Start: 2019-05-12 | End: 2019-05-13 | Stop reason: HOSPADM

## 2019-05-12 RX ORDER — OMEPRAZOLE 20 MG/1
40 CAPSULE, DELAYED RELEASE ORAL EVERY MORNING
Status: DISCONTINUED | OUTPATIENT
Start: 2019-05-13 | End: 2019-05-13 | Stop reason: HOSPADM

## 2019-05-12 RX ORDER — ACETAMINOPHEN 325 MG/1
650 TABLET ORAL EVERY 4 HOURS PRN
Status: DISCONTINUED | OUTPATIENT
Start: 2019-05-12 | End: 2019-05-13 | Stop reason: HOSPADM

## 2019-05-12 RX ORDER — PANTOPRAZOLE SODIUM 40 MG/1
40 TABLET, DELAYED RELEASE ORAL
Status: DISCONTINUED | OUTPATIENT
Start: 2019-05-13 | End: 2019-05-12

## 2019-05-12 RX ORDER — POTASSIUM BICARBONATE 25 MEQ/1
50 TABLET, EFFERVESCENT ORAL ONCE
Status: COMPLETED | OUTPATIENT
Start: 2019-05-12 | End: 2019-05-12

## 2019-05-12 RX ORDER — LORAZEPAM 0.5 MG/1
0.5 TABLET ORAL EVERY 6 HOURS PRN
Status: DISCONTINUED | OUTPATIENT
Start: 2019-05-12 | End: 2019-05-13 | Stop reason: HOSPADM

## 2019-05-12 RX ADMIN — ONDANSETRON 4 MG: 2 INJECTION INTRAMUSCULAR; INTRAVENOUS at 14:39

## 2019-05-12 RX ADMIN — SODIUM CHLORIDE: 9 INJECTION, SOLUTION INTRAVENOUS at 23:36

## 2019-05-12 RX ADMIN — ONDANSETRON 4 MG: 2 INJECTION INTRAMUSCULAR; INTRAVENOUS at 06:53

## 2019-05-12 RX ADMIN — LISINOPRIL 10 MG: 10 TABLET ORAL at 09:56

## 2019-05-12 RX ADMIN — POTASSIUM BICARBONATE 50 MEQ: 977.5 TABLET, EFFERVESCENT ORAL at 09:20

## 2019-05-12 RX ADMIN — ENOXAPARIN SODIUM 40 MG: 40 INJECTION SUBCUTANEOUS at 09:21

## 2019-05-12 RX ADMIN — SODIUM CHLORIDE: 9 INJECTION, SOLUTION INTRAVENOUS at 12:19

## 2019-05-12 RX ADMIN — ACETAMINOPHEN 650 MG: 325 TABLET ORAL at 14:39

## 2019-05-12 RX ADMIN — AMLODIPINE BESYLATE 2.5 MG: 2.5 TABLET ORAL at 09:56

## 2019-05-12 RX ADMIN — ACETAMINOPHEN 650 MG: 325 TABLET ORAL at 02:43

## 2019-05-12 ASSESSMENT — PAIN SCALES - GENERAL
PAINLEVEL_OUTOF10: 8
PAINLEVEL_OUTOF10: 3
PAINLEVEL_OUTOF10: 1
PAINLEVEL_OUTOF10: 2
PAINLEVEL_OUTOF10: 0

## 2019-05-12 ASSESSMENT — ENCOUNTER SYMPTOMS
VOMITING: 1
SHORTNESS OF BREATH: 0
COUGH: 0
NAUSEA: 1
DIARRHEA: 0

## 2019-05-12 ASSESSMENT — PAIN DESCRIPTION - LOCATION
LOCATION: HEAD
LOCATION: HEAD

## 2019-05-12 NOTE — CARE COORDINATION
Spoke with patient regarding discharge planning. She lives at home alone and uses a walker. Her daughter lives very close. Patient denies any discharge needs. Will follow.

## 2019-05-12 NOTE — ED NOTES
attemopted to call report to floor  Sam Wilfredo they would call back      [address] Martinezirma Degroot, RN  05/11/19 9698

## 2019-05-12 NOTE — ED PROVIDER NOTES
(ATIVAN) 0.5 MG tablet Take 0.5 mg by mouth every 6 hours as needed for Anxiety. lisinopril (PRINIVIL;ZESTRIL) 20 MG tablet Take 1 tablet by mouth daily  Qty: 30 tablet, Refills: 3      omeprazole (PRILOSEC) 40 MG delayed release capsule Take 40 mg by mouth daily              ALLERGIES     Codeine; Macrobid [nitrofurantoin macrocrystal]; and Ciprofloxacin    FAMILY HISTORY     History reviewed. No pertinent family history.        SOCIAL HISTORY       Social History     Socioeconomic History    Marital status: Single     Spouse name: None    Number of children: None    Years of education: None    Highest education level: None   Occupational History    None   Social Needs    Financial resource strain: None    Food insecurity:     Worry: None     Inability: None    Transportation needs:     Medical: None     Non-medical: None   Tobacco Use    Smoking status: Never Smoker    Smokeless tobacco: Never Used   Substance and Sexual Activity    Alcohol use: No    Drug use: No    Sexual activity: None   Lifestyle    Physical activity:     Days per week: None     Minutes per session: None    Stress: None   Relationships    Social connections:     Talks on phone: None     Gets together: None     Attends Nondenominational service: None     Active member of club or organization: None     Attends meetings of clubs or organizations: None     Relationship status: None    Intimate partner violence:     Fear of current or ex partner: None     Emotionally abused: None     Physically abused: None     Forced sexual activity: None   Other Topics Concern    None   Social History Narrative    None       SCREENINGS    Issac Coma Scale  Eye Opening: Spontaneous  Best Verbal Response: Oriented  Best Motor Response: Obeys commands  Justice Coma Scale Score: 15        PHYSICAL EXAM    (up to 7 for level 4, 8 or more for level 5)     ED Triage Vitals [05/11/19 2011]   BP Temp Temp Source Pulse Resp SpO2 Height Weight   (!) 145/63 Physician who either signs or Co-signs this chart in the absence of a cardiologist.    A 12-lead EKG was performed which shows normal sinus rhythm with rate of 90 bpm.  There are nonspecific ST and T-wave changes. There is no evidence of an acute STEMI MI. There is no ectopy. The axes are normal.  Summary: abnormal EKG but no acute findings of a STEMI MI.    RADIOLOGY:   Non-plain film images such as CT, Ultrasound and MRI are read by the radiologist. Plain radiographic images are visualized and preliminarily interpreted by the emergency physician with the below findings:    No diagnostic imaging was performed    Interpretation per the Radiologist below, if available at the time of this note:    XR ABDOMEN (KUB) (SINGLE AP VIEW)   Final Result      Nonobstructive bowel gas pattern.             ED BEDSIDE ULTRASOUND:   Performed by ED Physician - none    LABS:  Labs Reviewed   URINE RT REFLEX TO CULTURE - Abnormal; Notable for the following components:       Result Value    Leukocyte Esterase, Urine TRACE (*)     All other components within normal limits   COMPREHENSIVE METABOLIC PANEL - Abnormal; Notable for the following components:    Sodium 134 (*)     CO2 19 (*)     Anion Gap 17 (*)     Glucose 135 (*)     CREATININE 0.49 (*)     All other components within normal limits   CBC WITH AUTO DIFFERENTIAL - Abnormal; Notable for the following components:    RBC 4.04 (*)     MCH 32.7 (*)     Neutrophils # 9.4 (*)     Lymphocytes # 0.6 (*)     All other components within normal limits   BASIC METABOLIC PANEL W/ REFLEX TO MG FOR LOW K - Abnormal; Notable for the following components:    Potassium reflex Magnesium 3.0 (*)     Anion Gap 16 (*)     Glucose 127 (*)     CREATININE 0.45 (*)     All other components within normal limits    Narrative:     Michelle Mancia  LC4W tel. [phone],  Potassium results called to and read back by Sheri Queen, 05/12/2019 07:51,  by Mila Ballesteros has been rescheduled by [facility] at 5/12/2019 06:46. Reason:   Patient in restroom   CBC - Abnormal; Notable for the following components:    RBC 3.97 (*)     MCH 32.9 (*)     All other components within normal limits    Narrative:     Collection has been rescheduled by [facility] at 5/12/2019 06:46. Reason:   Patient in restroom   [address] MG FOR LOW K - Abnormal; Notable for the following components:    Potassium reflex Magnesium 2.9 (*)     BUN 6 (*)     All other components within normal limits    Narrative:     Shima Mayer  LC4W tel. [phone],  [place] results called to and read back by OLESYA Stephens, 05/13/2019  07:42, by ALBA   CBC - Abnormal; Notable for the following components:    RBC 3.89 (*)     Hematocrit 36.8 (*)     MCH 31.7 (*)     All other components within normal limits   URINE CULTURE    Narrative:     ORDER#: [id number]                          ORDERED BY: JHONNY MCGRAW  SOURCE: Urine Clean Catch                  COLLECTED:  05/11/19 20:30  ANTIBIOTICS AT DADA.:                      RECEIVED :  05/11/19 23:04   TROPONIN   LIPASE   MICROSCOPIC URINALYSIS   MAGNESIUM    Narrative:     Shima Mayre  LC4W tel. [phone],  Potassium results called to and read back by Tavares Martin, 05/12/2019 07:51,  by Jose Eduardo Mcneil has been rescheduled by [facility] at 5/12/2019 06:46. Reason:   Patient in restroom   MAGNESIUM    Narrative:     Shima Mayer  LC4W tel. [phone],  [place] results called to and read back by OLESYA Stephens, 05/13/2019  07:42, by ALBA       All other labs were within normal range or not returned as of this dictation.     EMERGENCY DEPARTMENT COURSE and DIFFERENTIAL DIAGNOSIS/MDM:   Vitals:    Vitals:    05/12/19 1946 05/13/19 0536 05/13/19 0725 05/13/19 1056   BP: (!) 159/87  (!) 150/68 (!) 161/74   Pulse: 81  73 78   Resp: 18  17    Temp: 98.4 °F (36.9 °C)  98.2 °F (36.8 °C)    TempSrc: Oral  Oral    SpO2: 98%  95%    Weight:  177 lb 1.6 oz (80.3 kg)     Height:           The patient was

## 2019-05-12 NOTE — ED NOTES
Bed: 16  Expected date:   Expected time:   Means of arrival:   Comments:  82 year female - seen here earlier today, UTI, now vomiting.       Violette Ballesteros RN  05/11/19 2008

## 2019-05-12 NOTE — PROGRESS NOTES
Admission completed. Pt A&Ox4. Pt came in complaining of N/V. She has had two episodes of dry heaving and states she is nauseous. Zofran was given. LS clear bilaterally. Pt is hard of hearing, but does not have any hearing aids. Pt wears upper dentures, but does not have them with her. An abdominal hernia is noted, soft and nontender to touch. Pt stated, \"my right hip is bone to bone so I cannot lay on that side. \" Pt ambulated to bathroom with walker, tolerated well. Family at bedside. Call light within reach. Will continue to monitor.  Electronically signed by Dudley Smith RN on 5/12/19 at 1:03 AM
Pt is alert and oriented x4. She lives alone and ambulates with steady gait. Pt reports feeling somewhat better. She still has some nausea and dry heaves at times. Her potassium was 3.0 this morning. Dr. Khai Veras notified. 50Meq of K+ given orally. Will continue to monitor.     Electronically signed by Natalie Hutchinson RN on 5/12/2019 at 12:33 PM
(36.8 °C), temperature source Oral, resp. rate 17, height 5' 6.5\" (1.689 m), weight 178 lb 6.4 oz (80.9 kg), SpO2 95 %. Subjective:  Symptoms:  She reports malaise and weakness. No shortness of breath, cough, chest pain, headache, chest pressure, anorexia, diarrhea or anxiety. Diet:  She reports  nausea and vomiting. Objective:  General Appearance:  Well-appearing, in no acute distress and comfortable. Vital signs: (most recent): Blood pressure (!) 154/79, pulse 86, temperature 98.2 °F (36.8 °C), temperature source Oral, resp. rate 17, height 5' 6.5\" (1.689 m), weight 178 lb 6.4 oz (80.9 kg), SpO2 95 %. HEENT: Normal HEENT exam.    Lungs:  Normal effort and normal respiratory rate. Breath sounds clear to auscultation. Heart: Normal rate. Regular rhythm. S1 normal.    Abdomen: Abdomen is soft. Bowel sounds are normal.   There is no abdominal tenderness. Extremities: Normal range of motion. Pulses: Distal pulses are intact. Neurological: Patient is alert and oriented to person, place and time. Pupils:  Pupils are equal, round, and reactive to light. Skin:  Warm and dry.       Lab Results   Component Value Date    WBC 7.5 05/12/2019    HGB 13.1 05/12/2019    HCT 38.1 05/12/2019    MCV 95.8 05/12/2019     05/12/2019     Lab Results   Component Value Date     05/12/2019    K 3.0 05/12/2019     05/12/2019    CO2 22 05/12/2019    BUN 10 05/12/2019    CREATININE 0.45 05/12/2019    GLUCOSE 127 05/12/2019    CALCIUM 8.6 05/12/2019        Assessment & Plan  1) Nausea and vomiting better, gastritis  Advance diet as tolerated  2) hypokalemia replace  3) HTn resume home meds  4) asymptomatic bact uria  5) anxiety stable  Possible d/c tomorrow am if pt cont to lizeth Perez MD  5/12/2019

## 2019-05-12 NOTE — ED NOTES
Pt the last time she took Zofran ODT at home was around 3pm today.   Per her daughter, she had Ativan 0.5mg at [address] Lat[address] Esteban, RN  05/11/19 2016

## 2019-05-12 NOTE — ED TRIAGE NOTES
Pt arrives via EMS for c/o nausea and vomiting. Pt was seen in this ED earlier today, dx with UTI. Pt was vomiting when she was here earlier. Pt states she went home and continued to have dry heaving and vomiting all day today. Pt states she has not been able to keep even fluids down. Pt states she feels very weak. Pt alert and oriented, skin warm and dry, resps even and unlabored.   No distress noted at this time

## 2019-05-12 NOTE — H&P
Hospital Medicine History & Physical      PCP: Rj Pfeiffer MD    Date of Admission: 5/11/2019    Date of Service: Pt seen/examined on 5/11/19 and Admitted to Observation. Chief Complaint:  Nausea and vomiting       History Of Present Illness:      82 y.o. female who presented to [facility] ED with complaint of nausea and vomiting. Patient has a history of anxiety and HTN. She was seen earlier today in ER and given IVF, Zofran, and phenergan and sent home with Zofran script. Per daughter at bedside patient never truly felt better at discharge and was still unable to keep any food or medications down after returning home. She denies any abdominal pain and last episode of emesis was 2 hours prior to arrival. Xray abdomen shows large amount of stool. Patient admits to having bowel movement earlier today. She deneis cp/sob/d/fever/chills/rash. Past Medical History:          Diagnosis Date    Anxiety     Hernia of abdominal cavity     Hypertension        Past Surgical History:          Procedure Laterality Date    ESOPHAGUS SURGERY      FLIPPED STOMACH       Medications Prior to Admission:      Prior to Admission medications    Medication Sig Start Date End Date Taking? Authorizing Provider   sulfamethoxazole-trimethoprim (BACTRIM DS) 800-160 MG per tablet Take 1 tablet by mouth 2 times daily 5/11/19  Yes Nicolette King APRN - CNP   ondansetron (ZOFRAN ODT) 4 MG disintegrating tablet Take 1-2 tablets by mouth every 12 hours as needed for Nausea May Sub regular tablet (non-ODT) if insurance does not cover ODT. 5/11/19  Yes Nicolette King APRN - CNP   amLODIPine (NORVASC) 2.5 MG tablet Take 2.5 mg by mouth daily   Yes Historical Provider, MD   LORazepam (ATIVAN) 0.5 MG tablet Take 0.5 mg by mouth every 6 hours as needed for Anxiety.    Yes Historical Provider, MD   lisinopril (PRINIVIL;ZESTRIL) 20 MG tablet Take 1 tablet by mouth daily 10/15/18  Yes Allegra Boast, MD   omeprazole ([address] [place] [address]) 40 MG delayed release capsule Take 40 mg by mouth daily    Yes Historical Provider, MD       Allergies:  Codeine; Macrobid [nitrofurantoin macrocrystal]; and Ciprofloxacin    Social History:      The patient currently lives alone    TOBACCO:   reports that she has never smoked. She has never used smokeless tobacco.  ETOH:   reports that she does not drink alcohol. Family History:       Reviewed in detail and negative for DM, CAD, Cancer, CVA. History reviewed. No pertinent family history. REVIEW OF SYSTEMS:   Pertinent positives as noted in the HPI. All other systems reviewed and negative. PHYSICAL EXAM:    BP (!) 146/63   Pulse 82   Temp 98.5 °F (36.9 °C) (Oral)   Resp 16   Ht 5' 6.5\" (1.689 m)   Wt 175 lb (79.4 kg)   SpO2 98%   BMI 27.82 kg/m²     General appearance:  No apparent distress, appears stated age and cooperative. HEENT:  Normal cephalic, atraumatic without obvious deformity. Pupils equal, round, and reactive to light. Extra ocular muscles intact. Conjunctivae/corneas clear. Dry oral mucosa   Neck: Supple, with full range of motion. No jugular venous distention. Trachea midline. Respiratory:  Normal respiratory effort. Clear to auscultation, bilaterally   Cardiovascular:  Regular rate and rhythm with normal S1/S2   Abdomen: Soft, non-tender, non-distended with hypoactive bowel sounds. Musculoskeletal:  No clubbing, cyanosis or edema bilaterally. Skin: Skin color, texture, turgor normal.  No rashes or lesions. Neurologic:  Neurovascularly intact without any focal sensory/motor deficits.  Cranial nerves: II-XII intact, grossly non-focal.  Psychiatric:  Alert and oriented, thought content appropriate, normal insight  Capillary Refill: Brisk,< 3 seconds   Peripheral Pulses: +2 palpable, equal bilaterally       Labs:     Recent Labs     05/11/19  0545 05/11/19  2030   WBC 10.3 10.4   HGB 13.1 13.2   HCT 38.0 38.3    171     Recent Labs     05/11/19  0545 05/11/19  0603 05/11/19 2030     --  134*   K 3.5  --  3.4     --  98   CO2 24  --  19*   BUN 17  --  15   CREATININE 0.48* 0.6 0.49*   CALCIUM 8.9  --  8.8     Recent Labs     05/11/19 0545 05/11/19 2030   AST 12 13   ALT 6 7   BILITOT 0.4 0.4   ALKPHOS 87 83     No results for input(s): INR in the last 72 hours. Recent Labs     05/11/19 2030   TROPONINI <0.010       Urinalysis:      Lab Results   Component Value Date    NITRU Negative 05/11/2019    WBCUA 0-2 05/11/2019    BACTERIA Negative 05/11/2019    RBCUA 0-2 05/11/2019    BLOODU Negative 05/11/2019    SPECGRAV 1.012 05/11/2019    GLUCOSEU Negative 05/11/2019       Radiology:     CXR: I have reviewed the CXR with the following interpretation: pending  EKG:  I have reviewed the EKG with the following interpretation: pending    XR ABDOMEN (KUB) (SINGLE AP VIEW)    (Results Pending)       ASSESSMENT:    Active Hospital Problems    Diagnosis Date Noted    Nausea & vomiting [R11.2] 05/11/2019       PLAN:    1. Intractable nausea and vomiting- clear liquids and advance as tolerated, IVF, antiemetics as needed, repeat labs in AM  2. HTN- restart on home cardiac medications, IV prn if continues to not tolerate po intake  3. Anxiety     DVT Prophylaxis: lovenox  Diet: clear liquids and advance  Code Status: full    Dispo - observation        Emy Burch, APRN - CNP    Thank you Teri Dela Cruz MD for the opportunity to be involved in this patient's care. If you have any questions or concerns please feel free to contact me. Attending's Note:  Pt was seen and evaluated and discussed care with NP. I agree with the above assessment and plan. Patient presented to the ED twice complaining of nausea and dry heaving. She denied actual vomiting but since she woke up she has been dry heaving. She denied abd pain or diarrhea. Her symptoms are more anxiety related vs viral gastritis. Will continue Zofran and IVF.  Will monitor over night and likely discharge in the morning.         Brissa Mccarthy, [address] [facility]

## 2019-05-13 VITALS
WEIGHT: 177.1 LBS | SYSTOLIC BLOOD PRESSURE: 161 MMHG | HEART RATE: 78 BPM | HEIGHT: 67 IN | OXYGEN SATURATION: 95 % | BODY MASS INDEX: 27.8 KG/M2 | DIASTOLIC BLOOD PRESSURE: 74 MMHG | RESPIRATION RATE: 17 BRPM | TEMPERATURE: 98.2 F

## 2019-05-13 LAB
ANION GAP SERPL CALCULATED.3IONS-SCNC: 14 MEQ/L (ref 9–15)
BUN BLDV-MCNC: 6 MG/DL (ref 8–23)
CALCIUM SERPL-MCNC: 8.7 MG/DL (ref 8.5–9.9)
CHLORIDE BLD-SCNC: 103 MEQ/L (ref 95–107)
CO2: 26 MEQ/L (ref 20–31)
CREAT SERPL-MCNC: 0.61 MG/DL (ref 0.5–0.9)
GFR AFRICAN AMERICAN: >60
GFR NON-AFRICAN AMERICAN: >60
GLUCOSE BLD-MCNC: 95 MG/DL (ref 70–99)
HCT VFR BLD CALC: 36.8 % (ref 37–47)
HEMOGLOBIN: 12.3 G/DL (ref 12–16)
MAGNESIUM: 1.8 MG/DL (ref 1.7–2.4)
MCH RBC QN AUTO: 31.7 PG (ref 27–31.3)
MCHC RBC AUTO-ENTMCNC: 33.5 % (ref 33–37)
MCV RBC AUTO: 94.6 FL (ref 82–100)
PDW BLD-RTO: 14 % (ref 11.5–14.5)
PLATELET # BLD: 160 K/UL (ref 130–400)
POTASSIUM REFLEX MAGNESIUM: 2.9 MEQ/L (ref 3.4–4.9)
POTASSIUM SERPL-SCNC: 3.8 MEQ/L (ref 3.4–4.9)
RBC # BLD: 3.89 M/UL (ref 4.2–5.4)
SODIUM BLD-SCNC: 143 MEQ/L (ref 135–144)
URINE CULTURE, ROUTINE: NORMAL
WBC # BLD: 5.7 K/UL (ref 4.8–10.8)

## 2019-05-13 PROCEDURE — 6360000002 HC RX W HCPCS: Performed by: NURSE PRACTITIONER

## 2019-05-13 PROCEDURE — 84132 ASSAY OF SERUM POTASSIUM: CPT

## 2019-05-13 PROCEDURE — 93010 ELECTROCARDIOGRAM REPORT: CPT | Performed by: INTERNAL MEDICINE

## 2019-05-13 PROCEDURE — 6370000000 HC RX 637 (ALT 250 FOR IP): Performed by: INTERNAL MEDICINE

## 2019-05-13 PROCEDURE — 36415 COLL VENOUS BLD VENIPUNCTURE: CPT

## 2019-05-13 PROCEDURE — 80048 BASIC METABOLIC PNL TOTAL CA: CPT

## 2019-05-13 PROCEDURE — 96372 THER/PROPH/DIAG INJ SC/IM: CPT

## 2019-05-13 PROCEDURE — 96376 TX/PRO/DX INJ SAME DRUG ADON: CPT

## 2019-05-13 PROCEDURE — 6360000002 HC RX W HCPCS: Performed by: INTERNAL MEDICINE

## 2019-05-13 PROCEDURE — G0378 HOSPITAL OBSERVATION PER HR: HCPCS

## 2019-05-13 PROCEDURE — 83735 ASSAY OF MAGNESIUM: CPT

## 2019-05-13 PROCEDURE — 2580000003 HC RX 258: Performed by: NURSE PRACTITIONER

## 2019-05-13 PROCEDURE — 85027 COMPLETE CBC AUTOMATED: CPT

## 2019-05-13 RX ORDER — POTASSIUM CHLORIDE 20 MEQ/1
40 TABLET, EXTENDED RELEASE ORAL ONCE
Status: COMPLETED | OUTPATIENT
Start: 2019-05-13 | End: 2019-05-13

## 2019-05-13 RX ORDER — POTASSIUM CHLORIDE 7.45 MG/ML
10 INJECTION INTRAVENOUS
Status: COMPLETED | OUTPATIENT
Start: 2019-05-13 | End: 2019-05-13

## 2019-05-13 RX ORDER — ONDANSETRON 4 MG/1
4-8 TABLET, ORALLY DISINTEGRATING ORAL EVERY 12 HOURS PRN
Qty: 12 TABLET | Refills: 0 | Status: SHIPPED | OUTPATIENT
Start: 2019-05-13 | End: 2020-06-25

## 2019-05-13 RX ADMIN — ONDANSETRON 4 MG: 2 INJECTION INTRAMUSCULAR; INTRAVENOUS at 09:17

## 2019-05-13 RX ADMIN — LORAZEPAM 0.5 MG: 0.5 TABLET ORAL at 09:27

## 2019-05-13 RX ADMIN — POTASSIUM CHLORIDE 10 MEQ: 7.46 INJECTION, SOLUTION INTRAVENOUS at 12:12

## 2019-05-13 RX ADMIN — OMEPRAZOLE 40 MG: 20 CAPSULE, DELAYED RELEASE ORAL at 11:09

## 2019-05-13 RX ADMIN — ACETAMINOPHEN 650 MG: 325 TABLET ORAL at 10:57

## 2019-05-13 RX ADMIN — POTASSIUM CHLORIDE 10 MEQ: 7.46 INJECTION, SOLUTION INTRAVENOUS at 10:49

## 2019-05-13 RX ADMIN — LISINOPRIL 10 MG: 10 TABLET ORAL at 09:18

## 2019-05-13 RX ADMIN — POTASSIUM CHLORIDE 10 MEQ: 7.46 INJECTION, SOLUTION INTRAVENOUS at 09:18

## 2019-05-13 RX ADMIN — POTASSIUM CHLORIDE 10 MEQ: 7.46 INJECTION, SOLUTION INTRAVENOUS at 12:00

## 2019-05-13 RX ADMIN — Medication 10 ML: at 09:18

## 2019-05-13 RX ADMIN — POTASSIUM CHLORIDE 40 MEQ: 20 TABLET, EXTENDED RELEASE ORAL at 09:18

## 2019-05-13 RX ADMIN — AMLODIPINE BESYLATE 2.5 MG: 2.5 TABLET ORAL at 11:09

## 2019-05-13 RX ADMIN — ENOXAPARIN SODIUM 40 MG: 40 INJECTION SUBCUTANEOUS at 09:18

## 2019-05-13 ASSESSMENT — PAIN SCALES - GENERAL
PAINLEVEL_OUTOF10: 0
PAINLEVEL_OUTOF10: 6

## 2019-05-13 NOTE — CARE COORDINATION
MET WITH PATIENT, FREEDOM OF CHOICE OFFERED. STATES FROM HOME ALONE, BUT PLAN IS DC TO HOME WITH SON. DECLINES C, SNF.

## 2019-05-13 NOTE — DISCHARGE INSTR - COC
Continuity of Care Form    Patient Name: Charlie Valente   :  1935  MRN:  [id number]    Admit date:  2019  Discharge date:  ***    Code Status Order: Full Code   Advance Directives:   Advance Care Flowsheet Documentation     Date/Time Healthcare Directive Type of Healthcare Directive Copy in [address] Agent's Name Healthcare Agent's Phone Number    [phone]  No, patient does not have an advance directive for healthcare treatment -- -- -- -- --          Admitting Physician:   Zully Hayes MD  PCP: Cari Barragan MD    Discharging Nurse: [facility] Unit/Room#: J815/N953-15  Discharging Unit Phone Number: ***    Emergency Contact:   Extended Emergency Contact Information  Primary Emergency Contact: [address] [facility] [address]  Phone: [phone]  Relation: Child  Secondary Emergency Contact: Jonas Hooper   [facility] [address] [facility] Phone: [phone]  Relation: Child    Past Surgical History:  Past Surgical History:   Procedure Laterality Date    ESOPHAGUS SURGERY      FLIPPED STOMACH       Immunization History:   Immunization History   Administered Date(s) Administered    Influenza, Quadv, 6 mo and older, IM, PF (Flulaval, Fluarix) 10/14/2018       Active Problems:  Patient Active Problem List   Diagnosis Code    [facility]ina at rest ([facility] I20.8    Hypertensive urgency I16.0    Chronic fatigue R53.82    Anxiety F41.9    Nausea & vomiting R11.2       Isolation/Infection:   Isolation          No Isolation            Nurse Assessment:  Last Vital Signs: BP (!) 161/74   Pulse 78   Temp 98.2 °F (36.8 °C) (Oral)   Resp 17   Ht 5' 6.5\" (1.689 m)   Wt 177 lb 1.6 oz (80.3 kg)   SpO2 95%   BMI 28.16 kg/m²     Last documented pain score (0-10 scale): Pain Level: 6  Last Weight:   Wt Readings from Last 1 Encounters:   19 177 lb 1.6 oz (80.3 kg)     Mental Status:  {IP PT MENTAL STATUS:60056}    IV Access:  [facility] IV Status Date: ***    Readmission Risk Assessment Score:  Readmission Risk              Risk of Unplanned Readmission:        9           Discharging to Facility/ Agency   · Name:   · Address:  · Phone:  · Fax:    Dialysis Facility (if applicable)   · Name:  · Address:  · Dialysis Schedule:  · Phone:  · Fax:    / signature: {Esignature:[id number]}    PHYSICIAN SECTION    Prognosis: {Prognosis:6392519064}    Condition at Discharge: [address] [place] [address] Patient Condition:321210045}    Rehab Potential (if transferring to Rehab): {Prognosis:2772479406}    Recommended Labs or Other Treatments After Discharge: ***    Physician Certification: I certify the above information and transfer of Adrian Roe  is necessary for the continuing treatment of the diagnosis listed and that she requires {Admit to Appropriate Level of Care:44450} for {GREATER/LESS:115035532} 30 days.      Update Admission H&P: {CHP DME Changes in LIGQZ:006702160}    PHYSICIAN SIGNATURE:  {Esignature:[id number]}

## 2019-05-13 NOTE — DISCHARGE SUMMARY
2012 Findings: Minimal bibasilar atelectasis. Ventral abdominal wall hernia of the upper abdomen measuring approximately 5 cm in AP dimension by 11 cm in transverse dimension by 13 cm in craniocaudal dimension with a mouth measuring up to 6 cm. This hernia contains a portion of the transverse colon and mesenteric fat without evidence of colonic obstruction or ischemia. The liver, gallbladder, spleen,  pancreas, and adrenal glands are within normal limits. Postsurgical changes of the stomach. The kidneys enhance uniformly. Multiple subcentimeter hypodensities within the right kidney are too small to definitively characterize. 11 mm left upper pole renal cyst and 10 mm left lower pole renal cyst. Additional subcentimeter hypodensities of the left kidney are too small to definitively characterize. No urinary tract calculi or hydronephrosis. Urinary bladder is well distended. Uterus is present. Abdominal aorta is nonaneurysmal  and demonstrates atherosclerotic calcification . No retroperitoneal or abdominal/pelvic lymphadenopathy. No small bowel obstruction. Diffuse nondistended nonspecific fluid-filled loops of small bowel. Fluid within the cecum, ascending, and proximal transverse colon. Sigmoid and descending colon diverticuli are identified. No overt colonic mass or pericolonic inflammation. Appendix is within normal limits. No free fluid or free air. Degenerative changes of the spine and right hip     Findings which may relate to enterocolitis. Colonic diverticulosis without diverticulitis. Ventral abdominal wall hernia measuring 5 x 11 x 13 cm continued portion of the transverse colon. This does not result in colonic obstruction and there are no findings colon ischemia. All CT scans at this facility use dose modulation, iterative reconstruction, and/or weight based dosing when appropriate to reduce radiation dose to as low as reasonably achievable.       Discharge Medications:       [employer] [place] Medication Instructions KEIRA:[id number]    Printed on:05/13/19 6163   Medication Information                      amLODIPine (NORVASC) 2.5 MG tablet  Take 2.5 mg by mouth daily             lisinopril (PRINIVIL;ZESTRIL) 20 MG tablet  Take 1 tablet by mouth daily             LORazepam (ATIVAN) 0.5 MG tablet  Take 0.5 mg by mouth every 6 hours as needed for Anxiety. omeprazole (PRILOSEC) 40 MG delayed release capsule  Take 40 mg by mouth daily              ondansetron (ZOFRAN ODT) 4 MG disintegrating tablet  Take 1-2 tablets by mouth every 12 hours as needed for Nausea May Sub regular tablet (non-ODT) if insurance does not cover ODT. Disposition:   Discharged to Home. Any [facility] needs that were indicated and/or required as been addressed and set up by Social Work. Condition at discharge: Pt was medically stable at the time of discharge. Significant improvement in clinical condition compared to initial condition at presentation to hospital    Activity: activity as tolerated, fall precautions. Total time taken for discharging this patient: 40 minutes. Greater than 70% of time was spent focused exclusively on this patient. Time was taken to review chart, discuss plans with consultants, reconciling medications, discussing plan answering questions with patient.      SignedDaluana Ros  5/13/2019, 12:47 PM  ----------------------------------------------------------------------------------------------------------------------    Arnel Roles,     Please return to ER or call 911 if you develop any significant signs or symptoms.     I may not have addressed all of your medical illnesses or the abnormal blood work or imaging therefore please ask your PCP, Alexandra Horan MD ,  to obtain [facility] record to follow up on all of the abnormal labs, imaging and findings that I have and have not addressed during your hospitalization.      Discharging you from the hospital does not mean that your medical care ends here and now. You may still need additional work up, investigation, monitoring, and treatment to be handled from this point on by outside providers including your PCP, Kiara Ross MD , Specialists and other healthcare providers.      Please review your list of discharge medications prior to resuming medications you might still have at home, as the medications you need to be taking, dosages or how often you must take them may have changed. For medication questions, contact your retail pharmacy and your PCP, Kiara Ross MD .     ** I STRONGLY RECOMMEND that you follow up with Kiara Ross MD within 3 to 5 days for a post hospitalization evaluation. This specific office visit is covered by your insurance, and is not the same as your annual doctor visit/ check up. This office visit is important, as it may prevent need for repeat and/or future hospitalizations. **    Your medical team at [facility] ([facility]) appreciates the opportunity to work with you to get well!     Sincerely,  Katie Nelson

## 2020-06-25 ENCOUNTER — APPOINTMENT (OUTPATIENT)
Dept: CT IMAGING | Age: 85
End: 2020-06-25
Payer: MEDICARE

## 2020-06-25 ENCOUNTER — HOSPITAL ENCOUNTER (EMERGENCY)
Age: 85
Discharge: HOME | End: 2020-06-25
Attending: EMERGENCY MEDICINE
Payer: MEDICARE

## 2020-06-25 VITALS
HEART RATE: 74 BPM | BODY MASS INDEX: 30.05 KG/M2 | OXYGEN SATURATION: 100 % | TEMPERATURE: 98.1 F | HEIGHT: 66 IN | RESPIRATION RATE: 20 BRPM | SYSTOLIC BLOOD PRESSURE: 133 MMHG | WEIGHT: 187 LBS | DIASTOLIC BLOOD PRESSURE: 84 MMHG

## 2020-06-25 LAB
ALBUMIN SERPL-MCNC: 4.3 G/DL (ref 3.5–4.6)
ALP BLD-CCNC: 87 U/L (ref 40–130)
ALT SERPL-CCNC: 6 U/L (ref 0–33)
ANION GAP SERPL CALCULATED.3IONS-SCNC: 11 MEQ/L (ref 9–15)
AST SERPL-CCNC: 12 U/L (ref 0–35)
BASOPHILS ABSOLUTE: 0 K/UL (ref 0–0.2)
BASOPHILS RELATIVE PERCENT: 0.2 %
BILIRUB SERPL-MCNC: 0.5 MG/DL (ref 0.2–0.7)
BUN BLDV-MCNC: 9 MG/DL (ref 8–23)
CALCIUM SERPL-MCNC: 10.2 MG/DL (ref 8.5–9.9)
CHLORIDE BLD-SCNC: 100 MEQ/L (ref 95–107)
CO2: 25 MEQ/L (ref 20–31)
CREAT SERPL-MCNC: 0.54 MG/DL (ref 0.5–0.9)
EKG ATRIAL RATE: 82 BPM
EKG P AXIS: 39 DEGREES
EKG P-R INTERVAL: 154 MS
EKG Q-T INTERVAL: 406 MS
EKG QRS DURATION: 62 MS
EKG QTC CALCULATION (BAZETT): 474 MS
EKG R AXIS: 16 DEGREES
EKG T AXIS: 58 DEGREES
EKG VENTRICULAR RATE: 82 BPM
EOSINOPHILS ABSOLUTE: 0 K/UL (ref 0–0.7)
EOSINOPHILS RELATIVE PERCENT: 0.6 %
GFR AFRICAN AMERICAN: >60
GFR AFRICAN AMERICAN: >60
GFR NON-AFRICAN AMERICAN: >60
GFR NON-AFRICAN AMERICAN: >60
GLOBULIN: 3.1 G/DL (ref 2.3–3.5)
GLUCOSE BLD-MCNC: 121 MG/DL (ref 70–99)
HCT VFR BLD CALC: 40.9 % (ref 37–47)
HEMOGLOBIN: 13.7 G/DL (ref 12–16)
LYMPHOCYTES ABSOLUTE: 1.5 K/UL (ref 1–4.8)
LYMPHOCYTES RELATIVE PERCENT: 23.5 %
MAGNESIUM: 2.1 MG/DL (ref 1.7–2.4)
MCH RBC QN AUTO: 31.5 PG (ref 27–31.3)
MCHC RBC AUTO-ENTMCNC: 33.6 % (ref 33–37)
MCV RBC AUTO: 93.7 FL (ref 82–100)
MONOCYTES ABSOLUTE: 0.4 K/UL (ref 0.2–0.8)
MONOCYTES RELATIVE PERCENT: 6.1 %
NEUTROPHILS ABSOLUTE: 4.4 K/UL (ref 1.4–6.5)
NEUTROPHILS RELATIVE PERCENT: 69.6 %
PDW BLD-RTO: 12.9 % (ref 11.5–14.5)
PERFORMED ON: NORMAL
PLATELET # BLD: 197 K/UL (ref 130–400)
POC CREATININE: 0.6 MG/DL (ref 0.6–1.2)
POC SAMPLE TYPE: NORMAL
POTASSIUM SERPL-SCNC: 3.5 MEQ/L (ref 3.4–4.9)
PRO-BNP: 163 PG/ML
RBC # BLD: 4.36 M/UL (ref 4.2–5.4)
SODIUM BLD-SCNC: 136 MEQ/L (ref 135–144)
TOTAL PROTEIN: 7.4 G/DL (ref 6.3–8)
TROPONIN: <0.01 NG/ML (ref 0–0.01)
TSH SERPL DL<=0.05 MIU/L-ACNC: 0.83 UIU/ML (ref 0.44–3.86)
WBC # BLD: 6.4 K/UL (ref 4.8–10.8)

## 2020-06-25 PROCEDURE — 84484 ASSAY OF TROPONIN QUANT: CPT

## 2020-06-25 PROCEDURE — 85025 COMPLETE CBC W/AUTO DIFF WBC: CPT

## 2020-06-25 PROCEDURE — 93005 ELECTROCARDIOGRAM TRACING: CPT | Performed by: EMERGENCY MEDICINE

## 2020-06-25 PROCEDURE — 84443 ASSAY THYROID STIM HORMONE: CPT

## 2020-06-25 PROCEDURE — 99285 EMERGENCY DEPT VISIT HI MDM: CPT

## 2020-06-25 PROCEDURE — 80053 COMPREHEN METABOLIC PANEL: CPT

## 2020-06-25 PROCEDURE — 6360000004 HC RX CONTRAST MEDICATION: Performed by: EMERGENCY MEDICINE

## 2020-06-25 PROCEDURE — 83880 ASSAY OF NATRIURETIC PEPTIDE: CPT

## 2020-06-25 PROCEDURE — 36415 COLL VENOUS BLD VENIPUNCTURE: CPT

## 2020-06-25 PROCEDURE — 83735 ASSAY OF MAGNESIUM: CPT

## 2020-06-25 PROCEDURE — 96375 TX/PRO/DX INJ NEW DRUG ADDON: CPT

## 2020-06-25 PROCEDURE — 71275 CT ANGIOGRAPHY CHEST: CPT

## 2020-06-25 PROCEDURE — 96374 THER/PROPH/DIAG INJ IV PUSH: CPT

## 2020-06-25 PROCEDURE — 6360000002 HC RX W HCPCS: Performed by: EMERGENCY MEDICINE

## 2020-06-25 RX ORDER — ONDANSETRON 4 MG/1
4 TABLET, ORALLY DISINTEGRATING ORAL 3 TIMES DAILY PRN
Qty: 21 TABLET | Refills: 0 | Status: SHIPPED | OUTPATIENT
Start: 2020-06-25

## 2020-06-25 RX ORDER — ONDANSETRON 2 MG/ML
4 INJECTION INTRAMUSCULAR; INTRAVENOUS ONCE
Status: COMPLETED | OUTPATIENT
Start: 2020-06-25 | End: 2020-06-25

## 2020-06-25 RX ORDER — LORAZEPAM 2 MG/ML
0.5 INJECTION INTRAMUSCULAR ONCE
Status: COMPLETED | OUTPATIENT
Start: 2020-06-25 | End: 2020-06-25

## 2020-06-25 RX ORDER — LORAZEPAM 1 MG/1
1 TABLET ORAL DAILY PRN
Qty: 12 TABLET | Refills: 0 | Status: SHIPPED | OUTPATIENT
Start: 2020-06-25 | End: 2020-07-07

## 2020-06-25 RX ADMIN — ONDANSETRON 4 MG: 2 INJECTION INTRAMUSCULAR; INTRAVENOUS at 10:12

## 2020-06-25 RX ADMIN — IOPAMIDOL 100 ML: 612 INJECTION, SOLUTION INTRAVENOUS at 10:31

## 2020-06-25 RX ADMIN — LORAZEPAM 0.5 MG: 2 INJECTION INTRAMUSCULAR; INTRAVENOUS at 10:12

## 2020-06-25 ASSESSMENT — ENCOUNTER SYMPTOMS
DIARRHEA: 0
BACK PAIN: 0
COUGH: 0
ABDOMINAL PAIN: 0
NAUSEA: 0
VOMITING: 0
SORE THROAT: 0
SHORTNESS OF BREATH: 1

## 2020-06-25 ASSESSMENT — PAIN SCALES - GENERAL: PAINLEVEL_OUTOF10: 6

## 2020-06-25 ASSESSMENT — PAIN DESCRIPTION - ONSET: ONSET: ON-GOING

## 2020-06-25 ASSESSMENT — PAIN DESCRIPTION - PROGRESSION: CLINICAL_PROGRESSION: GRADUALLY WORSENING

## 2020-06-25 ASSESSMENT — PAIN DESCRIPTION - ORIENTATION: ORIENTATION: LEFT

## 2020-06-25 ASSESSMENT — PAIN DESCRIPTION - FREQUENCY: FREQUENCY: CONTINUOUS

## 2020-06-25 ASSESSMENT — PAIN DESCRIPTION - DESCRIPTORS: DESCRIPTORS: ACHING

## 2020-06-25 ASSESSMENT — PAIN DESCRIPTION - LOCATION: LOCATION: CHEST

## 2020-06-25 ASSESSMENT — PAIN DESCRIPTION - PAIN TYPE: TYPE: ACUTE PAIN

## 2020-06-25 NOTE — ED PROVIDER NOTES
 Years of education: None    Highest education level: None   Occupational History    None   Social Needs    Financial resource strain: None    Food insecurity     Worry: None     Inability: None    Transportation needs     Medical: None     Non-medical: None   Tobacco Use    Smoking status: Never Smoker    Smokeless tobacco: Never Used   Substance and Sexual Activity    Alcohol use: No    Drug use: No    Sexual activity: None   Lifestyle    Physical activity     Days per week: None     Minutes per session: None    Stress: None   Relationships    Social connections     Talks on phone: None     Gets together: None     Attends Advent service: None     Active member of club or organization: None     Attends meetings of clubs or organizations: None     Relationship status: None    Intimate partner violence     Fear of current or ex partner: None     Emotionally abused: None     Physically abused: None     Forced sexual activity: None   Other Topics Concern    None   Social History Narrative    None         PHYSICAL EXAM       ED Triage Vitals   BP Temp Temp src Pulse Resp SpO2 Height Weight   -- -- -- -- -- -- -- --       Physical Exam  Vitals signs and nursing note reviewed. Constitutional:       Appearance: She is well-developed. HENT:      Head: Normocephalic. Right Ear: External ear normal.      Left Ear: External ear normal.   Eyes:      Conjunctiva/sclera: Conjunctivae normal.      Pupils: Pupils are equal, round, and reactive to light. Neck:      Musculoskeletal: Normal range of motion and neck supple. Cardiovascular:      Rate and Rhythm: Normal rate and regular rhythm. Heart sounds: Normal heart sounds. Pulmonary:      Effort: Pulmonary effort is normal.      Breath sounds: Normal breath sounds. Abdominal:      General: Bowel sounds are normal. There is no distension. Palpations: Abdomen is soft. Tenderness: There is no abdominal tenderness. Musculoskeletal: Normal range of motion. Skin:     General: Skin is warm and dry. Neurological:      Mental Status: She is alert and oriented to person, place, and time. Psychiatric:      Comments: Anxious           MDM  83 yo female presents to the ED with sob. Pt is afebrile, hemodynamically stable. Pt given IV ativan, IV zofran in the ED. EKG shows NSR with HR 82, normal axis, normal intervals, no ST changes. Labs unremarkable. CT PE negative. Pt reassessed and feels much better. Pt and granddaughter thinks her sob is likely due to stress/anxiety. Pt educated about sob and anxiety. Pt and granddaughter want to go home. Pt given prescription for ativan, zofran. Pt given sob and anxiety warning signs and will f/u with pcp. Pt understands plan. FINAL IMPRESSION      1. Shortness of breath    2. Anxiety state    3.  Anxiety          DISPOSITION/PLAN   DISPOSITION Decision To Discharge 06/25/2020 11:17:28 AM        DISCHARGE MEDICATIONS:  [unfilled]         Han Wells MD(electronically signed)  Attending Emergency Physician            Han Wells MD  06/25/20 3805

## 2020-06-26 ENCOUNTER — CARE COORDINATION (OUTPATIENT)
Dept: CARE COORDINATION | Age: 85
End: 2020-06-26

## 2020-06-26 PROCEDURE — 93010 ELECTROCARDIOGRAM REPORT: CPT | Performed by: INTERNAL MEDICINE

## 2023-01-18 PROBLEM — M16.10 HIP ARTHRITIS: Status: ACTIVE | Noted: 2023-01-18

## 2023-01-18 PROBLEM — K44.9 HIATAL HERNIA: Status: ACTIVE | Noted: 2023-01-18

## 2023-01-18 PROBLEM — I10 ESSENTIAL HYPERTENSION: Status: ACTIVE | Noted: 2023-01-18

## 2023-01-18 PROBLEM — M81.0 POST-MENOPAUSAL OSTEOPOROSIS: Status: ACTIVE | Noted: 2023-01-18

## 2023-01-18 PROBLEM — E78.00 HYPERCHOLESTEROLEMIA: Status: ACTIVE | Noted: 2023-01-18

## 2023-01-18 PROBLEM — F41.9 ANXIETY DISORDER: Status: ACTIVE | Noted: 2023-01-18

## 2023-01-18 PROBLEM — K43.2 POSTOPERATIVE INCISIONAL HERNIA: Status: ACTIVE | Noted: 2023-01-18

## 2023-01-18 RX ORDER — AMLODIPINE BESYLATE 2.5 MG/1
2.5 TABLET ORAL DAILY
COMMUNITY
End: 2023-08-08 | Stop reason: SDUPTHER

## 2023-01-18 RX ORDER — LISINOPRIL 20 MG/1
20 TABLET ORAL 2 TIMES DAILY
COMMUNITY
End: 2023-08-08 | Stop reason: SDUPTHER

## 2023-01-18 RX ORDER — OMEPRAZOLE 40 MG/1
40 CAPSULE, DELAYED RELEASE ORAL DAILY
COMMUNITY
End: 2023-08-08 | Stop reason: SDUPTHER

## 2023-01-18 RX ORDER — ONDANSETRON 4 MG/1
4 TABLET, FILM COATED ORAL EVERY 8 HOURS PRN
COMMUNITY
End: 2023-08-08 | Stop reason: SDUPTHER

## 2023-01-18 RX ORDER — LORAZEPAM 0.5 MG/1
0.5 TABLET ORAL DAILY PRN
COMMUNITY

## 2023-03-06 ENCOUNTER — APPOINTMENT (OUTPATIENT)
Dept: PRIMARY CARE | Facility: CLINIC | Age: 88
End: 2023-03-06
Payer: MEDICARE

## 2023-05-12 ENCOUNTER — OFFICE VISIT (OUTPATIENT)
Dept: PRIMARY CARE | Facility: CLINIC | Age: 88
End: 2023-05-12
Payer: MEDICARE

## 2023-05-12 ENCOUNTER — LAB (OUTPATIENT)
Dept: LAB | Facility: LAB | Age: 88
End: 2023-05-12
Payer: MEDICARE

## 2023-05-12 VITALS
TEMPERATURE: 96.9 F | DIASTOLIC BLOOD PRESSURE: 82 MMHG | SYSTOLIC BLOOD PRESSURE: 140 MMHG | HEIGHT: 65 IN | HEART RATE: 79 BPM | BODY MASS INDEX: 30.02 KG/M2 | WEIGHT: 180.2 LBS

## 2023-05-12 DIAGNOSIS — Z00.00 ROUTINE GENERAL MEDICAL EXAMINATION AT HEALTH CARE FACILITY: ICD-10-CM

## 2023-05-12 DIAGNOSIS — K14.8 LESION OF TONGUE: ICD-10-CM

## 2023-05-12 DIAGNOSIS — Z00.00 ROUTINE GENERAL MEDICAL EXAMINATION AT HEALTH CARE FACILITY: Primary | ICD-10-CM

## 2023-05-12 LAB
ALANINE AMINOTRANSFERASE (SGPT) (U/L) IN SER/PLAS: 7 U/L (ref 7–45)
ALBUMIN (G/DL) IN SER/PLAS: 4.3 G/DL (ref 3.4–5)
ALKALINE PHOSPHATASE (U/L) IN SER/PLAS: 77 U/L (ref 33–136)
ANION GAP IN SER/PLAS: 13 MMOL/L (ref 10–20)
ASPARTATE AMINOTRANSFERASE (SGOT) (U/L) IN SER/PLAS: 11 U/L (ref 9–39)
BASOPHILS (10*3/UL) IN BLOOD BY AUTOMATED COUNT: 0.04 X10E9/L (ref 0–0.1)
BASOPHILS/100 LEUKOCYTES IN BLOOD BY AUTOMATED COUNT: 0.5 % (ref 0–2)
BILIRUBIN TOTAL (MG/DL) IN SER/PLAS: 0.4 MG/DL (ref 0–1.2)
CALCIUM (MG/DL) IN SER/PLAS: 9.9 MG/DL (ref 8.6–10.3)
CARBON DIOXIDE, TOTAL (MMOL/L) IN SER/PLAS: 28 MMOL/L (ref 21–32)
CHLORIDE (MMOL/L) IN SER/PLAS: 103 MMOL/L (ref 98–107)
CHOLESTEROL (MG/DL) IN SER/PLAS: 208 MG/DL (ref 0–199)
CHOLESTEROL IN HDL (MG/DL) IN SER/PLAS: 48 MG/DL
CHOLESTEROL/HDL RATIO: 4.3
CREATININE (MG/DL) IN SER/PLAS: 0.7 MG/DL (ref 0.5–1.05)
EOSINOPHILS (10*3/UL) IN BLOOD BY AUTOMATED COUNT: 0.11 X10E9/L (ref 0–0.4)
EOSINOPHILS/100 LEUKOCYTES IN BLOOD BY AUTOMATED COUNT: 1.5 % (ref 0–6)
ERYTHROCYTE DISTRIBUTION WIDTH (RATIO) BY AUTOMATED COUNT: 12.7 % (ref 11.5–14.5)
ERYTHROCYTE MEAN CORPUSCULAR HEMOGLOBIN CONCENTRATION (G/DL) BY AUTOMATED: 32.7 G/DL (ref 32–36)
ERYTHROCYTE MEAN CORPUSCULAR VOLUME (FL) BY AUTOMATED COUNT: 98 FL (ref 80–100)
ERYTHROCYTES (10*6/UL) IN BLOOD BY AUTOMATED COUNT: 4.22 X10E12/L (ref 4–5.2)
GFR FEMALE: 84 ML/MIN/1.73M2
GLUCOSE (MG/DL) IN SER/PLAS: 92 MG/DL (ref 74–99)
HEMATOCRIT (%) IN BLOOD BY AUTOMATED COUNT: 41.3 % (ref 36–46)
HEMOGLOBIN (G/DL) IN BLOOD: 13.5 G/DL (ref 12–16)
IMMATURE GRANULOCYTES/100 LEUKOCYTES IN BLOOD BY AUTOMATED COUNT: 0.3 % (ref 0–0.9)
LDL: 113 MG/DL (ref 0–99)
LEUKOCYTES (10*3/UL) IN BLOOD BY AUTOMATED COUNT: 7.4 X10E9/L (ref 4.4–11.3)
LYMPHOCYTES (10*3/UL) IN BLOOD BY AUTOMATED COUNT: 3.05 X10E9/L (ref 0.8–3)
LYMPHOCYTES/100 LEUKOCYTES IN BLOOD BY AUTOMATED COUNT: 41.3 % (ref 13–44)
MONOCYTES (10*3/UL) IN BLOOD BY AUTOMATED COUNT: 0.56 X10E9/L (ref 0.05–0.8)
MONOCYTES/100 LEUKOCYTES IN BLOOD BY AUTOMATED COUNT: 7.6 % (ref 2–10)
NEUTROPHILS (10*3/UL) IN BLOOD BY AUTOMATED COUNT: 3.6 X10E9/L (ref 1.6–5.5)
NEUTROPHILS/100 LEUKOCYTES IN BLOOD BY AUTOMATED COUNT: 48.8 % (ref 40–80)
NON HDL CHOLESTEROL: 160 MG/DL
PLATELETS (10*3/UL) IN BLOOD AUTOMATED COUNT: 231 X10E9/L (ref 150–450)
POTASSIUM (MMOL/L) IN SER/PLAS: 4.3 MMOL/L (ref 3.5–5.3)
PROTEIN TOTAL: 7.1 G/DL (ref 6.4–8.2)
SODIUM (MMOL/L) IN SER/PLAS: 140 MMOL/L (ref 136–145)
TRIGLYCERIDE (MG/DL) IN SER/PLAS: 234 MG/DL (ref 0–149)
UREA NITROGEN (MG/DL) IN SER/PLAS: 18 MG/DL (ref 6–23)
VLDL: 47 MG/DL (ref 0–40)

## 2023-05-12 PROCEDURE — 80053 COMPREHEN METABOLIC PANEL: CPT

## 2023-05-12 PROCEDURE — 3079F DIAST BP 80-89 MM HG: CPT | Performed by: INTERNAL MEDICINE

## 2023-05-12 PROCEDURE — 3077F SYST BP >= 140 MM HG: CPT | Performed by: INTERNAL MEDICINE

## 2023-05-12 PROCEDURE — 36415 COLL VENOUS BLD VENIPUNCTURE: CPT

## 2023-05-12 PROCEDURE — 1159F MED LIST DOCD IN RCRD: CPT | Performed by: INTERNAL MEDICINE

## 2023-05-12 PROCEDURE — G0439 PPPS, SUBSEQ VISIT: HCPCS | Performed by: INTERNAL MEDICINE

## 2023-05-12 PROCEDURE — 80061 LIPID PANEL: CPT

## 2023-05-12 PROCEDURE — 1170F FXNL STATUS ASSESSED: CPT | Performed by: INTERNAL MEDICINE

## 2023-05-12 PROCEDURE — 1036F TOBACCO NON-USER: CPT | Performed by: INTERNAL MEDICINE

## 2023-05-12 PROCEDURE — 85025 COMPLETE CBC W/AUTO DIFF WBC: CPT

## 2023-05-12 PROCEDURE — 1160F RVW MEDS BY RX/DR IN RCRD: CPT | Performed by: INTERNAL MEDICINE

## 2023-05-12 ASSESSMENT — ENCOUNTER SYMPTOMS
EYES NEGATIVE: 1
GASTROINTESTINAL NEGATIVE: 1
ARTHRALGIAS: 1
RESPIRATORY NEGATIVE: 1
CARDIOVASCULAR NEGATIVE: 1
NERVOUS/ANXIOUS: 0
DEPRESSION: 0
BRUISES/BLEEDS EASILY: 0
ADENOPATHY: 0
OCCASIONAL FEELINGS OF UNSTEADINESS: 0
TROUBLE SWALLOWING: 0
DIZZINESS: 0
CONSTITUTIONAL NEGATIVE: 1
HEADACHES: 0
LOSS OF SENSATION IN FEET: 1

## 2023-05-12 ASSESSMENT — ACTIVITIES OF DAILY LIVING (ADL)
HEARING - RIGHT EAR: DIFFICULTY WITH NOISE
ADEQUATE_TO_COMPLETE_ADL: YES
HEARING - LEFT EAR: DIFFICULTY WITH NOISE
FEEDING YOURSELF: INDEPENDENT
TOILETING: INDEPENDENT
PATIENT'S MEMORY ADEQUATE TO SAFELY COMPLETE DAILY ACTIVITIES?: YES
JUDGMENT_ADEQUATE_SAFELY_COMPLETE_DAILY_ACTIVITIES: YES
GROOMING: INDEPENDENT
BATHING: NEEDS ASSISTANCE
DRESSING YOURSELF: INDEPENDENT
WALKS IN HOME: NEEDS ASSISTANCE

## 2023-05-12 ASSESSMENT — PATIENT HEALTH QUESTIONNAIRE - PHQ9
SUM OF ALL RESPONSES TO PHQ9 QUESTIONS 1 AND 2: 0
2. FEELING DOWN, DEPRESSED OR HOPELESS: NOT AT ALL
1. LITTLE INTEREST OR PLEASURE IN DOING THINGS: NOT AT ALL

## 2023-05-12 ASSESSMENT — COLUMBIA-SUICIDE SEVERITY RATING SCALE - C-SSRS
2. HAVE YOU ACTUALLY HAD ANY THOUGHTS OF KILLING YOURSELF?: NO
1. IN THE PAST MONTH, HAVE YOU WISHED YOU WERE DEAD OR WISHED YOU COULD GO TO SLEEP AND NOT WAKE UP?: NO

## 2023-05-12 NOTE — PROGRESS NOTES
Subjective   Reason for Visit: Angela Alba is an 88 y.o. female here for a Medicare Wellness visit.     Past Medical, Surgical, and Family History reviewed and updated in chart.    Reviewed all medications by prescribing practitioner or clinical pharmacist (such as prescriptions, OTCs, herbal therapies and supplements) and documented in the medical record.    88-year-old female patient came here because she has a lesion on the tongue, she was accompanied by her daughter and the son.  Patient is actually getting little bit frail because of osteoarthritis, she is using a rollator for mobility and ambulation.  She was basically brought over here because of this lesion on the tongue which came about a month ago, she was seen by me in October and at that time this issue was not addressed.  Patient is manifesting symptoms of osteoarthrosis, she has a previous history of hip fracture tibial fracture mild rotation of the GI tract, she has been manifesting adequate help at home, she lives independently, she is taking medications as listed, has not have any ER visits or hospitalization as daughter and son has been keeping close observation on this patient.  She does not manifest any evidence of cognitive impairment, wellness exam related questions were addressed and ask, she has a well kept living will.        Patient Care Team:  Barrington Yang MD as PCP - General  Barrington Yang MD as PCP - [employer] PCP     Review of Systems   Constitutional: Negative.    HENT:  Negative for dental problem and trouble swallowing.    Eyes: Negative.    Respiratory: Negative.     Cardiovascular: Negative.    Gastrointestinal: Negative.    Musculoskeletal:  Positive for arthralgias and gait problem.   Skin: Negative.    Neurological:  Negative for dizziness and headaches.   Hematological:  Negative for adenopathy. Does not bruise/bleed easily.   Psychiatric/Behavioral:  The patient is not nervous/anxious.        Objective  "  Vitals:  /82 (BP Location: Left arm, Patient Position: Sitting, BP Cuff Size: Adult)   Pulse 79   Temp 36.1 °C (96.9 °F) (Temporal)   Ht 1.651 m (5' 5\")   Wt 81.7 kg (180 lb 3.2 oz)   BMI 29.99 kg/m²       Physical Exam  Vitals reviewed.   Constitutional:       Appearance: She is overweight.   HENT:      Head: Normocephalic and atraumatic.      Mouth/Throat:      Comments: Growth on the tongue  Eyes:      Conjunctiva/sclera: Conjunctivae normal.   Cardiovascular:      Rate and Rhythm: Normal rate and regular rhythm.      Pulses: Normal pulses.   Pulmonary:      Effort: Pulmonary effort is normal.      Breath sounds: Normal breath sounds.   Abdominal:      Palpations: Abdomen is soft.   Musculoskeletal:         General: Normal range of motion.      Cervical back: Normal range of motion.   Skin:     General: Skin is warm and dry.   Neurological:      General: No focal deficit present.   Psychiatric:         Mood and Affect: Mood normal.       Assessment/Plan   Problem List Items Addressed This Visit          Other    Routine general medical examination at health care facility - Primary    Lesion of tongue   Besides wellness exam which was completed there is a lesion which is sort of a polypoid lesion on the distal dorsal aspect of the tongue, this lesion is new, it is not friable, it is not bleeding, this lesion needs to be excised, appropriate ENT referral was made and email was sent to ENT for early evaluation for this patient's lesion as it needs to be excised.  She will require routine laboratories, she will require periodic follow-up, fall precautions has been kept, she has not fallen lately, patient's her daughter and son were satisfied about excision plan for this lesion.  She is a heavyset female patient, has not have any other events or concerns, periodic follow-up will be maintained, referral was made.         "

## 2023-08-08 DIAGNOSIS — K21.9 GASTROESOPHAGEAL REFLUX DISEASE WITHOUT ESOPHAGITIS: ICD-10-CM

## 2023-08-08 DIAGNOSIS — I10 ESSENTIAL HYPERTENSION: ICD-10-CM

## 2023-08-08 DIAGNOSIS — R11.2 NAUSEA AND VOMITING, UNSPECIFIED VOMITING TYPE: ICD-10-CM

## 2023-08-08 RX ORDER — ONDANSETRON 4 MG/1
4 TABLET, FILM COATED ORAL EVERY 8 HOURS PRN
Qty: 20 TABLET | Refills: 0 | Status: SHIPPED | OUTPATIENT
Start: 2023-08-08 | End: 2024-05-16 | Stop reason: SDUPTHER

## 2023-08-08 RX ORDER — OMEPRAZOLE 40 MG/1
40 CAPSULE, DELAYED RELEASE ORAL DAILY
Qty: 30 CAPSULE | Refills: 11 | Status: SHIPPED | OUTPATIENT
Start: 2023-08-08 | End: 2023-08-14 | Stop reason: SDUPTHER

## 2023-08-08 RX ORDER — LISINOPRIL 20 MG/1
20 TABLET ORAL 2 TIMES DAILY
Qty: 30 TABLET | Refills: 11 | Status: SHIPPED | OUTPATIENT
Start: 2023-08-08 | End: 2023-08-28 | Stop reason: SDUPTHER

## 2023-08-08 RX ORDER — AMLODIPINE BESYLATE 2.5 MG/1
2.5 TABLET ORAL DAILY
Qty: 30 TABLET | Refills: 11 | Status: SHIPPED | OUTPATIENT
Start: 2023-08-08 | End: 2023-08-14 | Stop reason: SDUPTHER

## 2023-08-14 DIAGNOSIS — I10 ESSENTIAL HYPERTENSION: ICD-10-CM

## 2023-08-14 DIAGNOSIS — K21.9 GASTROESOPHAGEAL REFLUX DISEASE WITHOUT ESOPHAGITIS: ICD-10-CM

## 2023-08-14 RX ORDER — AMLODIPINE BESYLATE 2.5 MG/1
2.5 TABLET ORAL DAILY
Qty: 30 TABLET | Refills: 11 | Status: SHIPPED | OUTPATIENT
Start: 2023-08-14 | End: 2024-04-17 | Stop reason: SDUPTHER

## 2023-08-14 RX ORDER — OMEPRAZOLE 40 MG/1
40 CAPSULE, DELAYED RELEASE ORAL DAILY
Qty: 30 CAPSULE | Refills: 11 | Status: SHIPPED | OUTPATIENT
Start: 2023-08-14 | End: 2024-04-17 | Stop reason: SDUPTHER

## 2023-08-28 DIAGNOSIS — I10 ESSENTIAL HYPERTENSION: ICD-10-CM

## 2023-08-28 RX ORDER — LISINOPRIL 20 MG/1
20 TABLET ORAL 2 TIMES DAILY
Qty: 180 TABLET | Refills: 3 | Status: SHIPPED | OUTPATIENT
Start: 2023-08-28 | End: 2024-05-16 | Stop reason: ALTCHOICE

## 2023-11-17 ENCOUNTER — APPOINTMENT (OUTPATIENT)
Dept: PRIMARY CARE | Facility: CLINIC | Age: 88
End: 2023-11-17
Payer: MEDICARE

## 2024-04-16 ENCOUNTER — APPOINTMENT (OUTPATIENT)
Dept: CT IMAGING | Age: 89
End: 2024-04-16
Payer: MEDICARE

## 2024-04-16 ENCOUNTER — APPOINTMENT (OUTPATIENT)
Dept: GENERAL RADIOLOGY | Age: 89
End: 2024-04-16
Payer: MEDICARE

## 2024-04-16 ENCOUNTER — HOSPITAL ENCOUNTER (INPATIENT)
Age: 89
LOS: 3 days | Discharge: NURSING, REHAB OR LONG-TERM CARE | End: 2024-04-20
Attending: SURGERY | Admitting: SURGERY
Payer: MEDICARE

## 2024-04-16 DIAGNOSIS — S72.002A CLOSED FRACTURE OF NECK OF LEFT FEMUR, INITIAL ENCOUNTER (HCC): ICD-10-CM
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
## 2024-04-16 LAB
ABO + RH BLD: NORMAL
ALBUMIN SERPL-MCNC: 3.9 G/DL (ref 3.5–4.6)
ALP SERPL-CCNC: 90 U/L (ref 40–130)
ALT SERPL-CCNC: 9 U/L (ref 0–33)
ANION GAP SERPL CALCULATED.3IONS-SCNC: 13 MEQ/L (ref 9–15)
APTT PPP: 36.4 SEC (ref 24.4–36.8)
AST SERPL-CCNC: 13 U/L (ref 0–35)
BASOPHILS # BLD: 0 K/UL (ref 0–0.2)
BASOPHILS NFR BLD: 0.4 %
BILIRUB SERPL-MCNC: 0.4 MG/DL (ref 0.2–0.7)
BLD GP AB SCN SERPL QL: NORMAL
BUN SERPL-MCNC: 15 MG/DL (ref 8–23)
CALCIUM SERPL-MCNC: 9.1 MG/DL (ref 8.5–9.9)
CHLORIDE SERPL-SCNC: 102 MEQ/L (ref 95–107)
CO2 SERPL-SCNC: 23 MEQ/L (ref 20–31)
CREAT SERPL-MCNC: 0.57 MG/DL (ref 0.5–0.9)
EOSINOPHIL # BLD: 0.1 K/UL (ref 0–0.7)
EOSINOPHIL NFR BLD: 0.9 %
ERYTHROCYTE [DISTWIDTH] IN BLOOD BY AUTOMATED COUNT: 13 % (ref 11.5–14.5)
ETHANOL PERCENT: NORMAL G/DL
ETHANOLAMINE SERPL-MCNC: <10 MG/DL (ref 0–0.08)
GLOBULIN SER CALC-MCNC: 2.9 G/DL (ref 2.3–3.5)
GLUCOSE SERPL-MCNC: 115 MG/DL (ref 70–99)
HCT VFR BLD AUTO: 37.2 % (ref 37–47)
HGB BLD-MCNC: 12.1 G/DL (ref 12–16)
INR PPP: 1
LYMPHOCYTES # BLD: 2.5 K/UL (ref 1–4.8)
LYMPHOCYTES NFR BLD: 27.2 %
MCH RBC QN AUTO: 31.1 PG (ref 27–31.3)
MCHC RBC AUTO-ENTMCNC: 32.5 % (ref 33–37)
MCV RBC AUTO: 95.6 FL (ref 79.4–94.8)
MONOCYTES # BLD: 0.5 K/UL (ref 0.2–0.8)
MONOCYTES NFR BLD: 5.8 %
NEUTROPHILS # BLD: 5.9 K/UL (ref 1.4–6.5)
NEUTS SEG NFR BLD: 65 %
PLATELET # BLD AUTO: 203 K/UL (ref 130–400)
POTASSIUM SERPL-SCNC: 3.8 MEQ/L (ref 3.4–4.9)
PROT SERPL-MCNC: 6.8 G/DL (ref 6.3–8)
PROTHROMBIN TIME: 13.9 SEC (ref 12.3–14.9)
RBC # BLD AUTO: 3.89 M/UL (ref 4.2–5.4)
SODIUM SERPL-SCNC: 138 MEQ/L (ref 135–144)
WBC # BLD AUTO: 9.1 K/UL (ref 4.8–10.8)

## 2024-04-16 PROCEDURE — 73030 X-RAY EXAM OF SHOULDER: CPT

## 2024-04-16 PROCEDURE — 85025 COMPLETE CBC W/AUTO DIFF WBC: CPT

## 2024-04-16 PROCEDURE — 36415 COLL VENOUS BLD VENIPUNCTURE: CPT

## 2024-04-16 PROCEDURE — 86901 BLOOD TYPING SEROLOGIC RH(D): CPT

## 2024-04-16 PROCEDURE — 99285 EMERGENCY DEPT VISIT HI MDM: CPT

## 2024-04-16 PROCEDURE — 85610 PROTHROMBIN TIME: CPT

## 2024-04-16 PROCEDURE — 6360000002 HC RX W HCPCS: Performed by: PERSONAL EMERGENCY RESPONSE ATTENDANT

## 2024-04-16 PROCEDURE — 99223 1ST HOSP IP/OBS HIGH 75: CPT | Performed by: ORTHOPAEDIC SURGERY

## 2024-04-16 PROCEDURE — 96375 TX/PRO/DX INJ NEW DRUG ADDON: CPT

## 2024-04-16 PROCEDURE — 93005 ELECTROCARDIOGRAM TRACING: CPT | Performed by: PERSONAL EMERGENCY RESPONSE ATTENDANT

## 2024-04-16 PROCEDURE — 73502 X-RAY EXAM HIP UNI 2-3 VIEWS: CPT

## 2024-04-16 PROCEDURE — 70450 CT HEAD/BRAIN W/O DYE: CPT

## 2024-04-16 PROCEDURE — 80053 COMPREHEN METABOLIC PANEL: CPT

## 2024-04-16 PROCEDURE — 6830039000 HC L3 TRAUMA ALERT

## 2024-04-16 PROCEDURE — 86850 RBC ANTIBODY SCREEN: CPT

## 2024-04-16 PROCEDURE — 96374 THER/PROPH/DIAG INJ IV PUSH: CPT

## 2024-04-16 PROCEDURE — 72125 CT NECK SPINE W/O DYE: CPT

## 2024-04-16 PROCEDURE — 82077 ASSAY SPEC XCP UR&BREATH IA: CPT

## 2024-04-16 PROCEDURE — 85730 THROMBOPLASTIN TIME PARTIAL: CPT

## 2024-04-16 PROCEDURE — 86900 BLOOD TYPING SEROLOGIC ABO: CPT

## 2024-04-16 PROCEDURE — 71045 X-RAY EXAM CHEST 1 VIEW: CPT

## 2024-04-16 RX ORDER — ONDANSETRON 2 MG/ML
4 INJECTION INTRAMUSCULAR; INTRAVENOUS ONCE
Status: COMPLETED | OUTPATIENT
Start: 2024-04-16 | End: 2024-04-16

## 2024-04-16 RX ORDER — MORPHINE SULFATE 4 MG/ML
4 INJECTION, SOLUTION INTRAMUSCULAR; INTRAVENOUS ONCE
Status: COMPLETED | OUTPATIENT
Start: 2024-04-16 | End: 2024-04-17

## 2024-04-16 RX ORDER — MORPHINE SULFATE 4 MG/ML
4 INJECTION, SOLUTION INTRAMUSCULAR; INTRAVENOUS ONCE
Status: COMPLETED | OUTPATIENT
Start: 2024-04-16 | End: 2024-04-16

## 2024-04-16 RX ORDER — METOCLOPRAMIDE HYDROCHLORIDE 5 MG/ML
5 INJECTION INTRAMUSCULAR; INTRAVENOUS ONCE
Status: COMPLETED | OUTPATIENT
Start: 2024-04-16 | End: 2024-04-16

## 2024-04-16 RX ADMIN — METOCLOPRAMIDE HYDROCHLORIDE 5 MG: 5 INJECTION INTRAMUSCULAR; INTRAVENOUS at 23:04

## 2024-04-16 RX ADMIN — MORPHINE SULFATE 4 MG: 4 INJECTION, SOLUTION INTRAMUSCULAR; INTRAVENOUS at 20:39

## 2024-04-16 RX ADMIN — ONDANSETRON 4 MG: 2 INJECTION INTRAMUSCULAR; INTRAVENOUS at 20:39

## 2024-04-16 ASSESSMENT — ENCOUNTER SYMPTOMS
COLOR CHANGE: 0
SORE THROAT: 0
COUGH: 0
SHORTNESS OF BREATH: 0
RHINORRHEA: 0
ABDOMINAL PAIN: 0
VOMITING: 0
DIARRHEA: 0
NAUSEA: 1
BLOOD IN STOOL: 0

## 2024-04-16 ASSESSMENT — PAIN SCALES - GENERAL: PAINLEVEL_OUTOF10: 7

## 2024-04-16 ASSESSMENT — PAIN - FUNCTIONAL ASSESSMENT: PAIN_FUNCTIONAL_ASSESSMENT: 0-10

## 2024-04-16 ASSESSMENT — PAIN DESCRIPTION - ORIENTATION: ORIENTATION: LEFT

## 2024-04-17 ENCOUNTER — APPOINTMENT (OUTPATIENT)
Dept: GENERAL RADIOLOGY | Age: 89
End: 2024-04-17
Payer: MEDICARE

## 2024-04-17 ENCOUNTER — APPOINTMENT (OUTPATIENT)
Age: 89
End: 2024-04-17
Payer: MEDICARE

## 2024-04-17 ENCOUNTER — ANESTHESIA EVENT (OUTPATIENT)
Dept: OPERATING ROOM | Age: 89
End: 2024-04-17
Payer: MEDICARE

## 2024-04-17 ENCOUNTER — ANESTHESIA (OUTPATIENT)
Dept: OPERATING ROOM | Age: 89
End: 2024-04-17
Payer: MEDICARE

## 2024-04-17 DIAGNOSIS — I10 ESSENTIAL HYPERTENSION: ICD-10-CM

## 2024-04-17 DIAGNOSIS — K21.9 GASTROESOPHAGEAL REFLUX DISEASE WITHOUT ESOPHAGITIS: ICD-10-CM

## 2024-04-17 PROBLEM — S72.002A CLOSED LEFT HIP FRACTURE, INITIAL ENCOUNTER (HCC): Status: ACTIVE | Noted: 2024-04-17
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
## 2024-04-17 LAB
ECHO AV CUSP MM: 1.7 CM
ECHO AV MEAN GRADIENT: 3 MMHG
ECHO AV MEAN VELOCITY: 0.8 M/S
ECHO AV PEAK GRADIENT: 8 MMHG
ECHO AV PEAK VELOCITY: 1.4 M/S
ECHO AV VELOCITY RATIO: 0.86
ECHO AV VTI: 26.3 CM
ECHO BSA: 2 M2
ECHO LA DIAMETER INDEX: 1.22 CM/M2
ECHO LA DIAMETER: 2.4 CM
ECHO LA VOL A-L A2C: 19 ML (ref 22–52)
ECHO LA VOL A-L A4C: 41 ML (ref 22–52)
ECHO LA VOL MOD A2C: 18 ML (ref 22–52)
ECHO LA VOL MOD A4C: 42 ML (ref 22–52)
ECHO LA VOLUME AREA LENGTH: 35 ML
ECHO LA VOLUME INDEX A-L A2C: 10 ML/M2 (ref 16–34)
ECHO LA VOLUME INDEX A-L A4C: 21 ML/M2 (ref 16–34)
ECHO LA VOLUME INDEX AREA LENGTH: 18 ML/M2 (ref 16–34)
ECHO LA VOLUME INDEX MOD A2C: 9 ML/M2 (ref 16–34)
ECHO LA VOLUME INDEX MOD A4C: 21 ML/M2 (ref 16–34)
ECHO LV E' LATERAL VELOCITY: 6 CM/S
ECHO LV E' SEPTAL VELOCITY: 6 CM/S
ECHO LV EDV A2C: 62 ML
ECHO LV EDV A4C: 97 ML
ECHO LV EDV BP: 79 ML (ref 56–104)
ECHO LV EDV INDEX A4C: 49 ML/M2
ECHO LV EDV INDEX BP: 40 ML/M2
ECHO LV EDV NDEX A2C: 32 ML/M2
ECHO LV EJECTION FRACTION A2C: 66 %
ECHO LV EJECTION FRACTION A4C: 73 %
ECHO LV EJECTION FRACTION BIPLANE: 70 % (ref 55–100)
ECHO LV ESV A2C: 21 ML
ECHO LV ESV A4C: 26 ML
ECHO LV ESV BP: 24 ML (ref 19–49)
ECHO LV ESV INDEX A2C: 11 ML/M2
ECHO LV ESV INDEX A4C: 13 ML/M2
ECHO LV ESV INDEX BP: 12 ML/M2
ECHO LV FRACTIONAL SHORTENING: 39 % (ref 28–44)
ECHO LV INTERNAL DIMENSION DIASTOLE INDEX: 1.94 CM/M2
ECHO LV INTERNAL DIMENSION DIASTOLIC: 3.8 CM (ref 3.9–5.3)
ECHO LV INTERNAL DIMENSION SYSTOLIC INDEX: 1.17 CM/M2
ECHO LV INTERNAL DIMENSION SYSTOLIC: 2.3 CM
ECHO LV IVSD: 1.4 CM (ref 0.6–0.9)
ECHO LV IVSS: 1.9 CM
ECHO LV MASS 2D: 163 G (ref 67–162)
ECHO LV MASS INDEX 2D: 83.2 G/M2 (ref 43–95)
ECHO LV POSTERIOR WALL DIASTOLIC: 1.1 CM (ref 0.6–0.9)
ECHO LV POSTERIOR WALL SYSTOLIC: 1.4 CM
ECHO LV RELATIVE WALL THICKNESS RATIO: 0.58
ECHO LVOT AV VTI INDEX: 0.97
ECHO LVOT MEAN GRADIENT: 2 MMHG
ECHO LVOT PEAK GRADIENT: 6 MMHG
ECHO LVOT PEAK VELOCITY: 1.2 M/S
ECHO LVOT VTI: 25.4 CM
ECHO MV A VELOCITY: 1.12 M/S
ECHO MV E DECELERATION TIME (DT): 184.9 MS
ECHO MV E VELOCITY: 0.72 M/S
ECHO MV E/A RATIO: 0.64
ECHO MV E/E' LATERAL: 12
ECHO MV E/E' RATIO (AVERAGED): 12
ECHO MV LVOT VTI INDEX: 0.83
ECHO MV MAX VELOCITY: 1.1 M/S
ECHO MV MEAN GRADIENT: 1 MMHG
ECHO MV MEAN VELOCITY: 0.5 M/S
ECHO MV PEAK GRADIENT: 4 MMHG
ECHO MV VTI: 21.1 CM
ECHO PV MAX VELOCITY: 1 M/S
ECHO PV PEAK GRADIENT: 4 MMHG
ECHO RV INTERNAL DIMENSION: 2.4 CM
ECHO RV TAPSE: 1.6 CM (ref 1.7–?)
ECHO TV REGURGITANT MAX VELOCITY: 1.82 M/S
ECHO TV REGURGITANT PEAK GRADIENT: 13 MMHG

## 2024-04-17 PROCEDURE — APPNB15 APP NON BILLABLE TIME 0-15 MINS: Performed by: PHYSICIAN ASSISTANT

## 2024-04-17 PROCEDURE — 6370000000 HC RX 637 (ALT 250 FOR IP): Performed by: PERSONAL EMERGENCY RESPONSE ATTENDANT

## 2024-04-17 PROCEDURE — 3600000015 HC SURGERY LEVEL 5 ADDTL 15MIN: Performed by: ORTHOPAEDIC SURGERY

## 2024-04-17 PROCEDURE — 93306 TTE W/DOPPLER COMPLETE: CPT | Performed by: INTERNAL MEDICINE

## 2024-04-17 PROCEDURE — 2709999900 HC NON-CHARGEABLE SUPPLY: Performed by: ORTHOPAEDIC SURGERY

## 2024-04-17 PROCEDURE — 6370000000 HC RX 637 (ALT 250 FOR IP): Performed by: NURSE PRACTITIONER

## 2024-04-17 PROCEDURE — 7100000001 HC PACU RECOVERY - ADDTL 15 MIN: Performed by: ORTHOPAEDIC SURGERY

## 2024-04-17 PROCEDURE — 2500000003 HC RX 250 WO HCPCS: Performed by: ORTHOPAEDIC SURGERY

## 2024-04-17 PROCEDURE — 6360000002 HC RX W HCPCS: Performed by: ORTHOPAEDIC SURGERY

## 2024-04-17 PROCEDURE — 3700000000 HC ANESTHESIA ATTENDED CARE: Performed by: ORTHOPAEDIC SURGERY

## 2024-04-17 PROCEDURE — A4216 STERILE WATER/SALINE, 10 ML: HCPCS | Performed by: ORTHOPAEDIC SURGERY

## 2024-04-17 PROCEDURE — C1776 JOINT DEVICE (IMPLANTABLE): HCPCS | Performed by: ORTHOPAEDIC SURGERY

## 2024-04-17 PROCEDURE — 2580000003 HC RX 258: Performed by: ORTHOPAEDIC SURGERY

## 2024-04-17 PROCEDURE — 2500000003 HC RX 250 WO HCPCS: Performed by: REGISTERED NURSE

## 2024-04-17 PROCEDURE — 6360000002 HC RX W HCPCS: Performed by: NURSE PRACTITIONER

## 2024-04-17 PROCEDURE — 3700000001 HC ADD 15 MINUTES (ANESTHESIA): Performed by: ORTHOPAEDIC SURGERY

## 2024-04-17 PROCEDURE — 2580000003 HC RX 258: Performed by: PERSONAL EMERGENCY RESPONSE ATTENDANT

## 2024-04-17 PROCEDURE — 6360000004 HC RX CONTRAST MEDICATION: Performed by: SURGERY

## 2024-04-17 PROCEDURE — 6360000002 HC RX W HCPCS: Performed by: PERSONAL EMERGENCY RESPONSE ATTENDANT

## 2024-04-17 PROCEDURE — 7100000000 HC PACU RECOVERY - FIRST 15 MIN: Performed by: ORTHOPAEDIC SURGERY

## 2024-04-17 PROCEDURE — 2580000003 HC RX 258: Performed by: NURSE PRACTITIONER

## 2024-04-17 PROCEDURE — 6360000002 HC RX W HCPCS: Performed by: REGISTERED NURSE

## 2024-04-17 PROCEDURE — 2720000010 HC SURG SUPPLY STERILE: Performed by: ORTHOPAEDIC SURGERY

## 2024-04-17 PROCEDURE — 3600000005 HC SURGERY LEVEL 5 BASE: Performed by: ORTHOPAEDIC SURGERY

## 2024-04-17 PROCEDURE — 1210000000 HC MED SURG R&B

## 2024-04-17 PROCEDURE — C8929 TTE W OR WO FOL WCON,DOPPLER: HCPCS

## 2024-04-17 PROCEDURE — 27236 TREAT THIGH FRACTURE: CPT | Performed by: ORTHOPAEDIC SURGERY

## 2024-04-17 PROCEDURE — 72170 X-RAY EXAM OF PELVIS: CPT

## 2024-04-17 PROCEDURE — 0SRS0JZ REPLACEMENT OF LEFT HIP JOINT, FEMORAL SURFACE WITH SYNTHETIC SUBSTITUTE, OPEN APPROACH: ICD-10-PCS | Performed by: ORTHOPAEDIC SURGERY

## 2024-04-17 PROCEDURE — 96376 TX/PRO/DX INJ SAME DRUG ADON: CPT

## 2024-04-17 PROCEDURE — 6360000002 HC RX W HCPCS: Performed by: STUDENT IN AN ORGANIZED HEALTH CARE EDUCATION/TRAINING PROGRAM

## 2024-04-17 DEVICE — V40 FEMORAL HEAD
Type: IMPLANTABLE DEVICE | Site: HIP | Status: FUNCTIONAL
Brand: V40 HEAD

## 2024-04-17 DEVICE — IMPL CAPPED H6 HEMI UNI/BIPOLAR STRYKER  PRIMARY STEM: Type: IMPLANTABLE DEVICE | Site: HIP | Status: FUNCTIONAL

## 2024-04-17 DEVICE — BIPOLAR COMPONENT
Type: IMPLANTABLE DEVICE | Site: HIP | Status: FUNCTIONAL
Brand: UHR

## 2024-04-17 DEVICE — HIP STEM - STANDARD OFFSET
Type: IMPLANTABLE DEVICE | Site: HIP | Status: FUNCTIONAL
Brand: INSIGNIA

## 2024-04-17 RX ORDER — SODIUM CHLORIDE 0.9 % (FLUSH) 0.9 %
5-40 SYRINGE (ML) INJECTION EVERY 12 HOURS SCHEDULED
Status: DISCONTINUED | OUTPATIENT
Start: 2024-04-17 | End: 2024-04-17 | Stop reason: HOSPADM

## 2024-04-17 RX ORDER — OXYCODONE HYDROCHLORIDE 5 MG/1
10 TABLET ORAL PRN
Status: DISCONTINUED | OUTPATIENT
Start: 2024-04-17 | End: 2024-04-17 | Stop reason: HOSPADM

## 2024-04-17 RX ORDER — SODIUM CHLORIDE 0.9 % (FLUSH) 0.9 %
5-40 SYRINGE (ML) INJECTION PRN
Status: DISCONTINUED | OUTPATIENT
Start: 2024-04-17 | End: 2024-04-17 | Stop reason: HOSPADM

## 2024-04-17 RX ORDER — ENOXAPARIN SODIUM 100 MG/ML
30 INJECTION SUBCUTANEOUS 2 TIMES DAILY
Status: DISCONTINUED | OUTPATIENT
Start: 2024-04-17 | End: 2024-04-20 | Stop reason: HOSPADM

## 2024-04-17 RX ORDER — ONDANSETRON 2 MG/ML
4 INJECTION INTRAMUSCULAR; INTRAVENOUS
Status: DISCONTINUED | OUTPATIENT
Start: 2024-04-17 | End: 2024-04-17 | Stop reason: HOSPADM

## 2024-04-17 RX ORDER — EPHEDRINE SULFATE/0.9% NACL/PF 25 MG/5 ML
SYRINGE (ML) INTRAVENOUS PRN
Status: DISCONTINUED | OUTPATIENT
Start: 2024-04-17 | End: 2024-04-17 | Stop reason: SDUPTHER

## 2024-04-17 RX ORDER — HYDRALAZINE HYDROCHLORIDE 20 MG/ML
10 INJECTION INTRAMUSCULAR; INTRAVENOUS
Status: DISCONTINUED | OUTPATIENT
Start: 2024-04-17 | End: 2024-04-17 | Stop reason: HOSPADM

## 2024-04-17 RX ORDER — SODIUM CHLORIDE, SODIUM LACTATE, POTASSIUM CHLORIDE, CALCIUM CHLORIDE 600; 310; 30; 20 MG/100ML; MG/100ML; MG/100ML; MG/100ML
INJECTION, SOLUTION INTRAVENOUS CONTINUOUS
Status: DISCONTINUED | OUTPATIENT
Start: 2024-04-17 | End: 2024-04-17 | Stop reason: HOSPADM

## 2024-04-17 RX ORDER — LABETALOL HYDROCHLORIDE 5 MG/ML
10 INJECTION, SOLUTION INTRAVENOUS
Status: DISCONTINUED | OUTPATIENT
Start: 2024-04-17 | End: 2024-04-17 | Stop reason: HOSPADM

## 2024-04-17 RX ORDER — NALOXONE HYDROCHLORIDE 0.4 MG/ML
INJECTION, SOLUTION INTRAMUSCULAR; INTRAVENOUS; SUBCUTANEOUS PRN
Status: DISCONTINUED | OUTPATIENT
Start: 2024-04-17 | End: 2024-04-17 | Stop reason: HOSPADM

## 2024-04-17 RX ORDER — OXYCODONE HYDROCHLORIDE 5 MG/1
2.5 TABLET ORAL EVERY 4 HOURS PRN
Status: DISCONTINUED | OUTPATIENT
Start: 2024-04-17 | End: 2024-04-20 | Stop reason: HOSPADM

## 2024-04-17 RX ORDER — SODIUM CHLORIDE 0.9 % (FLUSH) 0.9 %
5-40 SYRINGE (ML) INJECTION EVERY 12 HOURS SCHEDULED
Status: DISCONTINUED | OUTPATIENT
Start: 2024-04-17 | End: 2024-04-20 | Stop reason: HOSPADM

## 2024-04-17 RX ORDER — HYDROXYZINE HYDROCHLORIDE 10 MG/1
10 TABLET, FILM COATED ORAL 3 TIMES DAILY
Status: DISCONTINUED | OUTPATIENT
Start: 2024-04-17 | End: 2024-04-20 | Stop reason: HOSPADM

## 2024-04-17 RX ORDER — ONDANSETRON 4 MG/1
4 TABLET, ORALLY DISINTEGRATING ORAL EVERY 8 HOURS PRN
Status: DISCONTINUED | OUTPATIENT
Start: 2024-04-17 | End: 2024-04-20 | Stop reason: HOSPADM

## 2024-04-17 RX ORDER — OXYCODONE HYDROCHLORIDE 5 MG/1
5 TABLET ORAL PRN
Status: DISCONTINUED | OUTPATIENT
Start: 2024-04-17 | End: 2024-04-17 | Stop reason: HOSPADM

## 2024-04-17 RX ORDER — SODIUM CHLORIDE 9 MG/ML
INJECTION, SOLUTION INTRAVENOUS PRN
Status: DISCONTINUED | OUTPATIENT
Start: 2024-04-17 | End: 2024-04-17 | Stop reason: HOSPADM

## 2024-04-17 RX ORDER — OXYCODONE HYDROCHLORIDE 5 MG/1
5 TABLET ORAL EVERY 4 HOURS PRN
Status: DISCONTINUED | OUTPATIENT
Start: 2024-04-17 | End: 2024-04-20 | Stop reason: HOSPADM

## 2024-04-17 RX ORDER — METHOCARBAMOL 500 MG/1
500 TABLET, FILM COATED ORAL 4 TIMES DAILY PRN
Status: DISCONTINUED | OUTPATIENT
Start: 2024-04-17 | End: 2024-04-20 | Stop reason: HOSPADM

## 2024-04-17 RX ORDER — FENTANYL CITRATE 0.05 MG/ML
50 INJECTION, SOLUTION INTRAMUSCULAR; INTRAVENOUS EVERY 5 MIN PRN
Status: DISCONTINUED | OUTPATIENT
Start: 2024-04-17 | End: 2024-04-17 | Stop reason: HOSPADM

## 2024-04-17 RX ORDER — DIPHENHYDRAMINE HYDROCHLORIDE 50 MG/ML
12.5 INJECTION INTRAMUSCULAR; INTRAVENOUS
Status: DISCONTINUED | OUTPATIENT
Start: 2024-04-17 | End: 2024-04-17 | Stop reason: HOSPADM

## 2024-04-17 RX ORDER — ACETAMINOPHEN 325 MG/1
650 TABLET ORAL EVERY 4 HOURS PRN
Status: DISCONTINUED | OUTPATIENT
Start: 2024-04-17 | End: 2024-04-17

## 2024-04-17 RX ORDER — ONDANSETRON 2 MG/ML
4 INJECTION INTRAMUSCULAR; INTRAVENOUS EVERY 6 HOURS PRN
Status: DISCONTINUED | OUTPATIENT
Start: 2024-04-17 | End: 2024-04-20 | Stop reason: HOSPADM

## 2024-04-17 RX ORDER — MEPERIDINE HYDROCHLORIDE 25 MG/ML
12.5 INJECTION INTRAMUSCULAR; INTRAVENOUS; SUBCUTANEOUS EVERY 5 MIN PRN
Status: DISCONTINUED | OUTPATIENT
Start: 2024-04-17 | End: 2024-04-17 | Stop reason: HOSPADM

## 2024-04-17 RX ORDER — SODIUM CHLORIDE 9 MG/ML
INJECTION, SOLUTION INTRAVENOUS PRN
Status: DISCONTINUED | OUTPATIENT
Start: 2024-04-17 | End: 2024-04-20 | Stop reason: HOSPADM

## 2024-04-17 RX ORDER — FENTANYL CITRATE 50 UG/ML
INJECTION, SOLUTION INTRAMUSCULAR; INTRAVENOUS PRN
Status: DISCONTINUED | OUTPATIENT
Start: 2024-04-17 | End: 2024-04-17 | Stop reason: SDUPTHER

## 2024-04-17 RX ORDER — PROPOFOL 10 MG/ML
INJECTION, EMULSION INTRAVENOUS CONTINUOUS PRN
Status: DISCONTINUED | OUTPATIENT
Start: 2024-04-17 | End: 2024-04-17 | Stop reason: SDUPTHER

## 2024-04-17 RX ORDER — FENTANYL CITRATE 0.05 MG/ML
25 INJECTION, SOLUTION INTRAMUSCULAR; INTRAVENOUS EVERY 5 MIN PRN
Status: DISCONTINUED | OUTPATIENT
Start: 2024-04-17 | End: 2024-04-17 | Stop reason: HOSPADM

## 2024-04-17 RX ORDER — PROCHLORPERAZINE EDISYLATE 5 MG/ML
5 INJECTION INTRAMUSCULAR; INTRAVENOUS
Status: DISCONTINUED | OUTPATIENT
Start: 2024-04-17 | End: 2024-04-17 | Stop reason: HOSPADM

## 2024-04-17 RX ORDER — BUPIVACAINE HYDROCHLORIDE 7.5 MG/ML
INJECTION, SOLUTION INTRASPINAL
Status: COMPLETED | OUTPATIENT
Start: 2024-04-17 | End: 2024-04-17

## 2024-04-17 RX ORDER — ACETAMINOPHEN 325 MG/1
650 TABLET ORAL EVERY 6 HOURS SCHEDULED
Status: DISCONTINUED | OUTPATIENT
Start: 2024-04-17 | End: 2024-04-20 | Stop reason: HOSPADM

## 2024-04-17 RX ORDER — AMLODIPINE BESYLATE 2.5 MG/1
2.5 TABLET ORAL DAILY
Qty: 30 TABLET | Refills: 11 | Status: SHIPPED | OUTPATIENT
Start: 2024-04-17

## 2024-04-17 RX ORDER — MAGNESIUM HYDROXIDE 1200 MG/15ML
LIQUID ORAL CONTINUOUS PRN
Status: DISCONTINUED | OUTPATIENT
Start: 2024-04-17 | End: 2024-04-17 | Stop reason: HOSPADM

## 2024-04-17 RX ORDER — MAGNESIUM HYDROXIDE 1200 MG/15ML
LIQUID ORAL PRN
Status: DISCONTINUED | OUTPATIENT
Start: 2024-04-17 | End: 2024-04-17 | Stop reason: HOSPADM

## 2024-04-17 RX ORDER — MORPHINE SULFATE 2 MG/ML
2 INJECTION, SOLUTION INTRAMUSCULAR; INTRAVENOUS
Status: DISCONTINUED | OUTPATIENT
Start: 2024-04-17 | End: 2024-04-17

## 2024-04-17 RX ORDER — OMEPRAZOLE 40 MG/1
40 CAPSULE, DELAYED RELEASE ORAL DAILY
Qty: 30 CAPSULE | Refills: 11 | Status: SHIPPED | OUTPATIENT
Start: 2024-04-17

## 2024-04-17 RX ORDER — SODIUM CHLORIDE 0.9 % (FLUSH) 0.9 %
5-40 SYRINGE (ML) INJECTION PRN
Status: DISCONTINUED | OUTPATIENT
Start: 2024-04-17 | End: 2024-04-20 | Stop reason: HOSPADM

## 2024-04-17 RX ADMIN — CEFAZOLIN 2000 MG: 2 INJECTION, POWDER, FOR SOLUTION INTRAMUSCULAR; INTRAVENOUS at 18:26

## 2024-04-17 RX ADMIN — PHENYLEPHRINE HYDROCHLORIDE 100 MCG: 10 INJECTION INTRAVENOUS at 12:41

## 2024-04-17 RX ADMIN — EPHEDRINE SULFATE 10 MG: 5 INJECTION INTRAVENOUS at 13:31

## 2024-04-17 RX ADMIN — HYDROXYZINE HYDROCHLORIDE 10 MG: 10 TABLET ORAL at 20:17

## 2024-04-17 RX ADMIN — PERFLUTREN 1.5 ML: 6.52 INJECTION, SUSPENSION INTRAVENOUS at 09:32

## 2024-04-17 RX ADMIN — Medication 10 ML: at 20:18

## 2024-04-17 RX ADMIN — HYDROXYZINE HYDROCHLORIDE 10 MG: 10 TABLET ORAL at 08:35

## 2024-04-17 RX ADMIN — PHENYLEPHRINE HYDROCHLORIDE 100 MCG: 10 INJECTION INTRAVENOUS at 13:12

## 2024-04-17 RX ADMIN — PROPOFOL 40 MG: 10 INJECTION, EMULSION INTRAVENOUS at 11:25

## 2024-04-17 RX ADMIN — MORPHINE SULFATE 2 MG: 2 INJECTION, SOLUTION INTRAMUSCULAR; INTRAVENOUS at 05:07

## 2024-04-17 RX ADMIN — ONDANSETRON 4 MG: 2 INJECTION INTRAMUSCULAR; INTRAVENOUS at 09:31

## 2024-04-17 RX ADMIN — ACETAMINOPHEN 650 MG: 325 TABLET ORAL at 22:53

## 2024-04-17 RX ADMIN — METHOCARBAMOL 500 MG: 500 TABLET ORAL at 08:35

## 2024-04-17 RX ADMIN — MORPHINE SULFATE 4 MG: 4 INJECTION, SOLUTION INTRAMUSCULAR; INTRAVENOUS at 00:03

## 2024-04-17 RX ADMIN — SODIUM CHLORIDE: 9 INJECTION, SOLUTION INTRAVENOUS at 10:04

## 2024-04-17 RX ADMIN — ENOXAPARIN SODIUM 30 MG: 100 INJECTION SUBCUTANEOUS at 20:17

## 2024-04-17 RX ADMIN — TRANEXAMIC ACID 1000 MG: 100 INJECTION, SOLUTION INTRAVENOUS at 13:43

## 2024-04-17 RX ADMIN — CEFAZOLIN 2000 MG: 2 INJECTION, POWDER, FOR SOLUTION INTRAMUSCULAR; INTRAVENOUS at 11:25

## 2024-04-17 RX ADMIN — ONDANSETRON 4 MG: 4 TABLET, ORALLY DISINTEGRATING ORAL at 08:35

## 2024-04-17 RX ADMIN — ACETAMINOPHEN 650 MG: 325 TABLET ORAL at 18:26

## 2024-04-17 RX ADMIN — PHENYLEPHRINE HYDROCHLORIDE 100 MCG: 10 INJECTION INTRAVENOUS at 11:30

## 2024-04-17 RX ADMIN — EPHEDRINE SULFATE 15 MG: 5 INJECTION INTRAVENOUS at 12:18

## 2024-04-17 RX ADMIN — FENTANYL CITRATE 25 MCG: 50 INJECTION, SOLUTION INTRAMUSCULAR; INTRAVENOUS at 11:12

## 2024-04-17 RX ADMIN — PROPOFOL 60 MCG/KG/MIN: 10 INJECTION, EMULSION INTRAVENOUS at 11:24

## 2024-04-17 RX ADMIN — BUPIVACAINE HYDROCHLORIDE IN DEXTROSE 15 MG: 7.5 INJECTION, SOLUTION SUBARACHNOID at 11:18

## 2024-04-17 RX ADMIN — TRANEXAMIC ACID 1000 MG: 100 INJECTION, SOLUTION INTRAVENOUS at 11:25

## 2024-04-17 ASSESSMENT — PAIN DESCRIPTION - ONSET: ONSET: ON-GOING

## 2024-04-17 ASSESSMENT — ENCOUNTER SYMPTOMS
VOMITING: 0
BACK PAIN: 0
DIARRHEA: 0
WHEEZING: 0
NAUSEA: 1
CHEST TIGHTNESS: 0
EYE REDNESS: 0
COUGH: 0
EYE PAIN: 0
SHORTNESS OF BREATH: 0
ABDOMINAL PAIN: 0
SHORTNESS OF BREATH: 1
PHOTOPHOBIA: 0
SORE THROAT: 0

## 2024-04-17 ASSESSMENT — PAIN SCALES - GENERAL
PAINLEVEL_OUTOF10: 0
PAINLEVEL_OUTOF10: 5
PAINLEVEL_OUTOF10: 0
PAINLEVEL_OUTOF10: 0
PAINLEVEL_OUTOF10: 5
PAINLEVEL_OUTOF10: 5
PAINLEVEL_OUTOF10: 7
PAINLEVEL_OUTOF10: 0
PAINLEVEL_OUTOF10: 7
PAINLEVEL_OUTOF10: 10

## 2024-04-17 ASSESSMENT — PAIN DESCRIPTION - LOCATION
LOCATION: HIP;LEG
LOCATION: HIP;LEG
LOCATION: HIP
LOCATION: OTHER (COMMENT)
LOCATION: HIP

## 2024-04-17 ASSESSMENT — PAIN DESCRIPTION - FREQUENCY: FREQUENCY: CONTINUOUS

## 2024-04-17 ASSESSMENT — PAIN DESCRIPTION - ORIENTATION
ORIENTATION: LEFT

## 2024-04-17 ASSESSMENT — LIFESTYLE VARIABLES
HOW MANY STANDARD DRINKS CONTAINING ALCOHOL DO YOU HAVE ON A TYPICAL DAY: PATIENT DOES NOT DRINK
HOW OFTEN DO YOU HAVE A DRINK CONTAINING ALCOHOL: NEVER

## 2024-04-17 ASSESSMENT — PAIN DESCRIPTION - PAIN TYPE: TYPE: ACUTE PAIN

## 2024-04-17 ASSESSMENT — PAIN DESCRIPTION - DESCRIPTORS
DESCRIPTORS: ACHING;SHARP;STABBING
DESCRIPTORS: SPASM

## 2024-04-17 ASSESSMENT — PAIN DESCRIPTION - DIRECTION: RADIATING_TOWARDS: LEFT

## 2024-04-17 ASSESSMENT — PAIN - FUNCTIONAL ASSESSMENT: PAIN_FUNCTIONAL_ASSESSMENT: PREVENTS OR INTERFERES WITH MANY ACTIVE NOT PASSIVE ACTIVITIES

## 2024-04-17 NOTE — ANESTHESIA PRE PROCEDURE
Department of Anesthesiology  Preprocedure Note       Name:  Paola Childers   Age:  89 y.o.  :  1935                                          MRN:  [id number]         Date:  2024      Surgeon: Surgeon(s):  Edd Arrington DO    Procedure: Procedure(s):  LEFT HIP GREGORIO ARTHROPLASTY. SHARON CISNEROS CALLED, NEEDS TIME. ROOM 269    Medications prior to admission:   Prior to Admission medications    Medication Sig Start Date End Date Taking? Authorizing Provider   ondansetron (ZOFRAN-ODT) 4 MG disintegrating tablet Take 1 tablet by mouth 3 times daily as needed for Nausea or Vomiting 20   Griffin Nixon MD   amLODIPine (NORVASC) 2.5 MG tablet Take 1 tablet by mouth daily    ProviderMaria G MD   lisinopril (PRINIVIL;ZESTRIL) 20 MG tablet Take 1 tablet by mouth daily  Patient taking differently: Take 1 tablet by mouth in the morning and at bedtime 10/15/18   Darrell Arvizu MD   omeprazole (PRILOSEC) 40 MG delayed release capsule Take 1 capsule by mouth daily    Provider, MD Maria G       Current medications:    Current Facility-Administered Medications   Medication Dose Route Frequency Provider Last Rate Last Admin    sodium chloride flush 0.9 % injection 5-40 mL  5-40 mL IntraVENous 2 times per day Patti Iverson PA        sodium chloride flush 0.9 % injection 5-40 mL  5-40 mL IntraVENous PRN Patti Iverson PA        0.9 % sodium chloride infusion   IntraVENous PRN Patti Iverson PA 50 mL/hr at 24 1004 New Bag at 24 1004    ondansetron (ZOFRAN-ODT) disintegrating tablet 4 mg  4 mg Oral Q8H PRN Patti Iverson PA   4 mg at 24 0835    Or    ondansetron (ZOFRAN) injection 4 mg  4 mg IntraVENous Q6H PRN Patti Iverson PA   4 mg at 24 0931    oxyCODONE (ROXICODONE) immediate release tablet 2.5 mg  2.5 mg Oral Q4H PRN Ramila Munoz T, APRN - CNP        oxyCODONE (ROXICODONE) immediate release tablet 5 mg  5 mg Oral Q4H PRN Ramila Munoz, APRN - CNP

## 2024-04-17 NOTE — PROGRESS NOTES
Pt to and from surgery for left femur repair, pt returned 1620, multiple family mambers at bedside to see pt. Pt remained with eyes closed and asked to just rest. Pt will anwser questions when asked and oriented x 3 wound vac to left hip/leg repair, pneumatic compressions on pt along with hip abductor pillow which she returned from surgery with. Purwik placed to void while pt remains asleep. Ice maintained to left hip.

## 2024-04-17 NOTE — PROGRESS NOTES
TRAUMA DAILY PROGRESS NOTE      Patient Name: Paola Childers  Admission Date 2024    Hospital Day: 0  Patient seen and examined on 2024    INTERVAL HISTORY/EVENTS     Background:  Paola Childers is a 89 y.o. female with a PMHx of HTN presented to [facility] ED on 2024 s/p fall from standing at home while closing her blinds. (+) head, (-) LOC, (-) AC/AP.     Trauma work up revealed left femoral neck fracture. She was subsequently admitted to our trauma service. Ortho consulted.      Hospital Course:  2024: s/p fall from standing on . (+) head strike, (-) LOC, (-) AC/AP. Admitted to trauma. Ortho consulted.      24 Hour Events:  Admitted to our trauma service overnight.  with Cardiac Echo ordered prior to OR. Cardiac Echo obtained. EF of 55 - 60%. No acute issues. OK for OR with Ortho from Trauma Standpoint.      PHYSICAL EXAM     Vitals Average, Min and Max for last 24 hours:  Temp: Temp: 97.9 °F (36.6 °C); Temp  Av °F (36.7 °C)  Min: 97.8 °F (36.6 °C)  Max: 98.4 °F (36.9 °C)  Respirations: Resp  Avg: 15  Min: 12  Max: 18  Pulse: Pulse  Av.9  Min: 74  Max: 90  Blood Pressure: Systolic (24hrs), Av , Min:90 , Max:159   ; Diastolic (24hrs), Av, Min:56, Max:76    SpO2: SpO2  Av.5 %  Min: 92 %  Max: 98 %    24hr Intake/Output: No intake or output data in the 24 hours ending 24 0851    Vitals: BP (!) 140/66   Pulse 74   Temp 97.9 °F (36.6 °C)   Resp 15   Ht 1.676 m (5' 5.98\")   Wt 86.2 kg (190 lb)   SpO2 95%   BMI 30.68 kg/m²     Physical Exam:  Constitutional: Alert and conversant. Appears in no acute distress.  HEENT: Atraumatic normocephalic.  Cardiovascular: Regular rate and rhythm.   Pulmonary: Breaths unlabored on room air. No wheezing, rhonchi or rales.  Abdominal: Soft. Non-distended. Non-tender.  Musculoskeletal: Left LE shortened and externally rotated. TTP at left greater trochanter. No LE edema  Neurological: Alert, awake, and orientated x 3.

## 2024-04-17 NOTE — CONSULTS
Consult Note  Patient: Paola Childers  Unit/Bed: W269/W269-01  YOB: 1935  MRN: [id number]  Acct: [id number]   Admitting Diagnosis: Fall, initial encounter [W19.XXXA]  Closed fracture of neck of left femur, initial encounter ([facility]) [S72.002A]  Closed left hip fracture, initial encounter ([facility]) [S72.002A]  Date:  4/16/2024  Hospital Day: 0    Complaint:  Fall   Left leg/ hip pain  Inability to ambulate  nausea    History of Present Illness:    Paola Childers is a 89 y.o. female with PMHx of anxiety, hypertension, angina at rest presents to the emergency department with mechanical fall.  Patient states that she was going to close her blinds and she lost her balance and fell on her left side.  She had her life alert and thinks the neighbor also heard her yelling and ambulance arrived.  Nonambulatory since.  Positive head injury, negative loss consciousness, negative thinners.  Patient complains of minimal headache, neck pain, left shoulder pain, mostly left hip pain.     In ED pt was noted to have left hip fracture- and ortho was consulted   Pt does continue to have a papin in left leg   She also has quite a bit of nausea     PMHx:  Past Medical History:   Diagnosis Date    Anxiety     Hernia of abdominal cavity     Hypertension        PSHx:  Past Surgical History:   Procedure Laterality Date    ESOPHAGUS SURGERY      FLIPPED STOMACH       Social Hx:  Social History     Socioeconomic History    Marital status: Single     Spouse name: None    Number of children: None    Years of education: None    Highest education level: None   Tobacco Use    Smoking status: Never    Smokeless tobacco: Never   Vaping Use    Vaping Use: Never used   Substance and Sexual Activity    Alcohol use: No    Drug use: No     Social Determinants of Health     Food Insecurity: No Food Insecurity (4/17/2024)    Hunger Vital Sign     Worried About Running Out of Food in the Last Year: Never true     Ran Out of Food in the Last Year:

## 2024-04-17 NOTE — FLOWSHEET NOTE
Pt admitted to room 269 via cot,  Alert and oriented x4, home medications updated  Pt oriented to room and call system,   Pain 7/10, medicated per MAR  Chlorhexidine bed bath given

## 2024-04-17 NOTE — PLAN OF CARE
Problem: Pain  Goal: Verbalizes/displays adequate comfort level or baseline comfort level  4/17/2024 1047 by Jesenia Nj RN  Outcome: Progressing  4/17/2024 0152 by Carly Cruz RN  Outcome: Progressing     Problem: Safety - Adult  Goal: Free from fall injury  4/17/2024 1047 by Jesenia Nj RN  Outcome: Progressing  4/17/2024 0152 by Carly Cruz RN  Outcome: Progressing     Problem: ABCDS Injury Assessment  Goal: Absence of physical injury  Outcome: Progressing     Problem: Skin/Tissue Integrity  Goal: Absence of new skin breakdown  Description: 1.  Monitor for areas of redness and/or skin breakdown  2.  Assess vascular access sites hourly  3.  Every 4-6 hours minimum:  Change oxygen saturation probe site  4.  Every 4-6 hours:  If on nasal continuous positive airway pressure, respiratory therapy assess nares and determine need for appliance change or resting period.  Outcome: Progressing     Problem: Discharge Planning  Goal: Discharge to home or other facility with appropriate resources  Outcome: Progressing

## 2024-04-17 NOTE — H&P
Paola Childers is an 89 y.o.  female who sustained a fall while closing the blinds in her home and sustained a left hip fracture.  She says she does not believe she tripped or lost her balance, but she remembers the entire fall, was never lightheaded, and never lost consciousness.  Her head did strike the floor during the fall.  CT head negative.  Paola has difficulty walking due to hip and knee pain, but she sleeps sitting up in her recliner and she gets short of breath when bending over to tie her shoes.  She has a diagnosis of HTN, but otherwise has never had a cardiac workup.  She cannot walk up stairs.    Past Medical History:   Diagnosis Date    Anxiety     Hernia of abdominal cavity     Hypertension        Allergies:   Allergies   Allergen Reactions    Codeine Nausea And Vomiting    Macrobid [Nitrofurantoin Macrocrystal] Nausea And Vomiting     Pt was severely ill after taking     Ciprofloxacin Nausea And Vomiting       Active Problems:    * No active hospital problems. *  Resolved Problems:    * No resolved hospital problems. *    Blood pressure (!) 152/74, pulse 79, temperature 97.8 °F (36.6 °C), resp. rate 16, height 1.676 m (5' 6\"), weight 86.2 kg (190 lb), SpO2 98 %.    Review of Systems   Respiratory:  Positive for shortness of breath. Negative for chest tightness.    Cardiovascular:  Positive for leg swelling. Negative for chest pain.   Neurological:  Negative for dizziness.     See HPI      Physical Exam  Neuro: awake, alert, GCS 15, moving all extremities  CV: heart RRR, palpable DP pulses YAZMIN  Pulm: good air movement, symmetric breath sounds  Abd: soft, nd, nttp  Ext: tenderness to left hip, otherwise atraumatic        Assessment:  89 yoF with very poor functional status and a hip fracture after a fall    Plan:  Not cleared for surgery from trauma perspective.  Will get echo ASAP  Admit to trauma    Bill Ureña MD  4/16/2024

## 2024-04-17 NOTE — PROGRESS NOTES
Patient admitted to our trauma service for left hip fracture by Dr. Ureña overnight with Cardiac Echo ordered prior to OR.  Cardiac Echo obtained. EF of 55 - 60%. No acute issues.    OK for OR with Ortho from Trauma Standpoint.

## 2024-04-17 NOTE — ED TRIAGE NOTES
Fall at home from standing no thinners no LOC left hip pain with rotation, left shoulder pain, neck pain states she hit her head

## 2024-04-17 NOTE — ANESTHESIA POSTPROCEDURE EVALUATION
Department of Anesthesiology  Postprocedure Note    Patient: Paola Childers  MRN: [id number]  YOB: 1935  Date of evaluation: 4/17/2024    Procedure Summary       Date: 04/17/24 Room / Location: [address]    Anesthesia Start: 1105 Anesthesia Stop: 1432    Procedure: LEFT HIP GREGORIO ARTHROPLASTY (Left: Hip) Diagnosis:       Closed left hip fracture, initial encounter ([facility])      (Closed left hip fracture, initial encounter ([facility]) [S72.002A])    Surgeons: Edd Arrington DO Responsible Provider: Elvin Wooten MD    Anesthesia Type: MAC, spinal ASA Status: 3            Anesthesia Type: No value filed.    Jerome Phase I:      Jerome Phase II:      Anesthesia Post Evaluation    Patient location during evaluation: bedside  Patient participation: complete - patient participated  Level of consciousness: awake and awake and alert  Airway patency: patent  Nausea & Vomiting: no nausea and no vomiting  Cardiovascular status: blood pressure returned to baseline and hemodynamically stable  Respiratory status: acceptable  Hydration status: euvolemic  Pain management: adequate        No notable events documented.

## 2024-04-17 NOTE — ED PROVIDER NOTES
[facility] ED  eMERGENCY dEPARTMENT eNCOUnter      Pt Name: Paola Childers  MRN: [id number]  Birthdate 1935  Date of evaluation: 4/16/2024  Provider: ROVERTO COLIN    My attending is Dr. Russell    HISTORY OF PRESENT ILLNESS    Paola Childers is a 89 y.o. female with PMHx of anxiety, hypertension, angina at rest presents to the emergency department with fall.  Patient states that she was going to close her blinds and she lost her balance and fell on her left side.  She had her life alert and thinks the neighbor also heard her yelling and ambulance arrived.  Nonambulatory since.  Positive head injury, negative loss consciousness, negative thinners.  Patient complains of minimal headache, neck pain, left shoulder pain, mostly left hip pain.  She is not try to get nauseous.  Ambulance gave fentanyl 25mcg.  She denies chest pain, shortness of breath, back pain, abdominal pain, vomiting.     DNR-CCA (NO INTUBATION)    HPI    Nursing Notes were reviewed.    REVIEW OF SYSTEMS       Review of Systems   Constitutional:  Negative for appetite change, chills and fever.   HENT:  Negative for congestion, rhinorrhea and sore throat.    Respiratory:  Negative for cough and shortness of breath.    Cardiovascular:  Negative for chest pain.   Gastrointestinal:  Positive for nausea. Negative for abdominal pain, blood in stool, diarrhea and vomiting.   Genitourinary:  Negative for difficulty urinating.   Musculoskeletal:  Positive for arthralgias and neck pain. Negative for neck stiffness.   Skin:  Negative for color change and rash.   Neurological:  Positive for headaches. Negative for dizziness, syncope, weakness, light-headedness and numbness.   All other systems reviewed and are negative.            PAST MEDICAL HISTORY     Past Medical History:   Diagnosis Date    Anxiety     Hernia of abdominal cavity     Hypertension          SURGICAL HISTORY       Past Surgical History:   Procedure Laterality Date

## 2024-04-17 NOTE — ANESTHESIA PROCEDURE NOTES
Spinal Block    Patient location during procedure: OR  End time: 4/17/2024 11:18 AM  Reason for block: primary anesthetic  Staffing  Performed: anesthesiologist   Anesthesiologist: Elvin Wooten MD  Performed by: Elvin Wooten MD  Authorized by: Elvin Wooten MD    Spinal Block  Patient position: right lateral decubitus  Prep: Betadine  Patient monitoring: cardiac monitor, continuous pulse ox, frequent blood pressure checks and oxygen  Approach: midline  Location: L4/L5  Provider prep: mask and sterile gloves  Local infiltration: lidocaine  Needle  Needle type: Pencan   Needle gauge: 24 G  Needle length: 3.5 in  Assessment  Sensory level: T6  Events: cerebrospinal fluid  Swirl obtained: Yes (CSF aspirated)  CSF: clear  Attempts: 1  Hemodynamics: stable  Additional Notes  Free flowing CSF.  Negative heme.  Negative paresthesia.  .  Preanesthetic Checklist  Completed: patient identified, IV checked, site marked, risks and benefits discussed, surgical/procedural consents, equipment checked, pre-op evaluation, timeout performed, anesthesia consent given, oxygen available and monitors applied/VS acknowledged

## 2024-04-17 NOTE — OP NOTE
Hip Hemiarthroplasty    Patient Name: Paola Childers  : 1935  MRN: [id number]  [place] [facility] [place]   Account: [id number]     Date of Surgery: 2024   Surgeon(s):  Edd Arrington DO    Pre-op Diagnosis: Left femoral neck fracture     Post-Op Diagnosis:Same    Surgical Procedure(s):  Left hip hemiarthroplasty     Assistants: First Assistant: Zainab Muñoz.    Anesthesia type/spinal/blocks/exparel: Choice    Estimated blood loss: 100     Specimens: * No specimens in log *    Drains: None    Implants:   Implant Name Type Inv. Item Serial No.  Lot No. LRB No. Used Action   STEM FEM STD OFFSET 5 HIP CLLRD INSIGNIA - [id number]  STEM FEM STD OFFSET 5 HIP CLLRD INSIGNIA  [employer] [id number] Left 1 Implanted   HEAD FEM OD43MM ID26MM UNIV CO CHROM COMPHSVE SZ ARRY FOR - [id number]  HEAD FEM OD43MM ID26MM UNIV CO CHROM COMPHSVE SZ ARRY FOR  [employer] HOW-WD [id number] Left 1 Implanted   HEAD FEM [id number] -3MM OFFSET HIP VIT POLYETH NK V40 TAPR - [id number]  HEAD FEM [id number] -3MM OFFSET HIP VIT POLYETH NK V40 TAPR  [employer] HOW-WD [id number] Left 1 Implanted       Complications: None    Disposition: Returned the PACU in stable condition.    HPI/indication for surgery: The patient is a 89 year old female who presents with left hip pain. Xrays of her pelvis were taken and she was shown to have a left femoral neck fracture. The family and patient elected to proceed with left hip hemiarthroplasty. Risks and potential complications were explained to the patient. They understood the risks and potential complications and agreed to proceed.  A surgical consent was obtained and placed on the patient's chart.    Findings: Her acetabulum had wear and I believe this is why the hemiarthroplasty head does not look to seat completely in the acetabulum. I trialed 41, 42, and 44 bipolar heads and the 43 bipolar head had the best suction and fit in the pelvis.

## 2024-04-18 LAB
ANION GAP SERPL CALCULATED.3IONS-SCNC: 11 MEQ/L (ref 9–15)
BASOPHILS # BLD: 0 K/UL (ref 0–0.2)
BASOPHILS NFR BLD: 0.2 %
BUN SERPL-MCNC: 25 MG/DL (ref 8–23)
CALCIUM SERPL-MCNC: 9.1 MG/DL (ref 8.5–9.9)
CHLORIDE SERPL-SCNC: 105 MEQ/L (ref 95–107)
CO2 SERPL-SCNC: 23 MEQ/L (ref 20–31)
CREAT SERPL-MCNC: 0.59 MG/DL (ref 0.5–0.9)
EKG ATRIAL RATE: 82 BPM
EKG P AXIS: 29 DEGREES
EKG P-R INTERVAL: 168 MS
EKG Q-T INTERVAL: 378 MS
EKG QRS DURATION: 74 MS
EKG QTC CALCULATION (BAZETT): 441 MS
EKG R AXIS: 11 DEGREES
EKG T AXIS: 12 DEGREES
EKG VENTRICULAR RATE: 82 BPM
EOSINOPHIL # BLD: 0 K/UL (ref 0–0.7)
EOSINOPHIL NFR BLD: 0 %
ERYTHROCYTE [DISTWIDTH] IN BLOOD BY AUTOMATED COUNT: 13.1 % (ref 11.5–14.5)
GLUCOSE SERPL-MCNC: 134 MG/DL (ref 70–99)
HCT VFR BLD AUTO: 34.1 % (ref 37–47)
HGB BLD-MCNC: 11 G/DL (ref 12–16)
LYMPHOCYTES # BLD: 1.4 K/UL (ref 1–4.8)
LYMPHOCYTES NFR BLD: 10.8 %
MAGNESIUM SERPL-MCNC: 2.2 MG/DL (ref 1.7–2.4)
MCH RBC QN AUTO: 30.6 PG (ref 27–31.3)
MCHC RBC AUTO-ENTMCNC: 32.3 % (ref 33–37)
MCV RBC AUTO: 94.7 FL (ref 79.4–94.8)
MONOCYTES # BLD: 1 K/UL (ref 0.2–0.8)
MONOCYTES NFR BLD: 7.5 %
NEUTROPHILS # BLD: 10.5 K/UL (ref 1.4–6.5)
NEUTS SEG NFR BLD: 81 %
PLATELET # BLD AUTO: 166 K/UL (ref 130–400)
POTASSIUM SERPL-SCNC: 3.9 MEQ/L (ref 3.4–4.9)
RBC # BLD AUTO: 3.6 M/UL (ref 4.2–5.4)
SODIUM SERPL-SCNC: 139 MEQ/L (ref 135–144)
WBC # BLD AUTO: 13 K/UL (ref 4.8–10.8)

## 2024-04-18 PROCEDURE — 99232 SBSQ HOSP IP/OBS MODERATE 35: CPT | Performed by: PHYSICIAN ASSISTANT

## 2024-04-18 PROCEDURE — 83735 ASSAY OF MAGNESIUM: CPT

## 2024-04-18 PROCEDURE — 6370000000 HC RX 637 (ALT 250 FOR IP): Performed by: NURSE PRACTITIONER

## 2024-04-18 PROCEDURE — 97535 SELF CARE MNGMENT TRAINING: CPT

## 2024-04-18 PROCEDURE — 36415 COLL VENOUS BLD VENIPUNCTURE: CPT

## 2024-04-18 PROCEDURE — 6360000002 HC RX W HCPCS: Performed by: NURSE PRACTITIONER

## 2024-04-18 PROCEDURE — 1210000000 HC MED SURG R&B

## 2024-04-18 PROCEDURE — 6370000000 HC RX 637 (ALT 250 FOR IP): Performed by: PHYSICIAN ASSISTANT

## 2024-04-18 PROCEDURE — 85025 COMPLETE CBC W/AUTO DIFF WBC: CPT

## 2024-04-18 PROCEDURE — 93010 ELECTROCARDIOGRAM REPORT: CPT | Performed by: INTERNAL MEDICINE

## 2024-04-18 PROCEDURE — 97110 THERAPEUTIC EXERCISES: CPT

## 2024-04-18 PROCEDURE — 2580000003 HC RX 258: Performed by: NURSE PRACTITIONER

## 2024-04-18 PROCEDURE — 97162 PT EVAL MOD COMPLEX 30 MIN: CPT

## 2024-04-18 PROCEDURE — 2700000000 HC OXYGEN THERAPY PER DAY

## 2024-04-18 PROCEDURE — 80048 BASIC METABOLIC PNL TOTAL CA: CPT

## 2024-04-18 PROCEDURE — 97166 OT EVAL MOD COMPLEX 45 MIN: CPT

## 2024-04-18 RX ORDER — LIDOCAINE 4 G/G
2 PATCH TOPICAL DAILY
Status: DISCONTINUED | OUTPATIENT
Start: 2024-04-18 | End: 2024-04-18

## 2024-04-18 RX ADMIN — ACETAMINOPHEN 650 MG: 325 TABLET ORAL at 09:08

## 2024-04-18 RX ADMIN — Medication 10 ML: at 09:07

## 2024-04-18 RX ADMIN — CAMPHOR (SYNTHETIC), MENTHOL, UNSPECIFIED FORM, AND METHYL SALICYLATE: 40; 100; 300 CREAM TOPICAL at 17:26

## 2024-04-18 RX ADMIN — ENOXAPARIN SODIUM 30 MG: 100 INJECTION SUBCUTANEOUS at 09:06

## 2024-04-18 RX ADMIN — HYDROXYZINE HYDROCHLORIDE 10 MG: 10 TABLET ORAL at 09:06

## 2024-04-18 RX ADMIN — Medication 10 ML: at 21:09

## 2024-04-18 RX ADMIN — ENOXAPARIN SODIUM 30 MG: 100 INJECTION SUBCUTANEOUS at 21:08

## 2024-04-18 RX ADMIN — HYDROXYZINE HYDROCHLORIDE 10 MG: 10 TABLET ORAL at 14:10

## 2024-04-18 RX ADMIN — CEFAZOLIN 2000 MG: 2 INJECTION, POWDER, FOR SOLUTION INTRAMUSCULAR; INTRAVENOUS at 05:44

## 2024-04-18 RX ADMIN — ACETAMINOPHEN 650 MG: 325 TABLET ORAL at 21:08

## 2024-04-18 RX ADMIN — ACETAMINOPHEN 650 MG: 325 TABLET ORAL at 15:26

## 2024-04-18 RX ADMIN — ONDANSETRON 4 MG: 4 TABLET, ORALLY DISINTEGRATING ORAL at 04:03

## 2024-04-18 RX ADMIN — ONDANSETRON 4 MG: 2 INJECTION INTRAMUSCULAR; INTRAVENOUS at 09:59

## 2024-04-18 ASSESSMENT — PAIN SCALES - GENERAL
PAINLEVEL_OUTOF10: 7
PAINLEVEL_OUTOF10: 8
PAINLEVEL_OUTOF10: 5
PAINLEVEL_OUTOF10: 3
PAINLEVEL_OUTOF10: 7

## 2024-04-18 ASSESSMENT — PAIN DESCRIPTION - DESCRIPTORS
DESCRIPTORS: ACHING

## 2024-04-18 ASSESSMENT — PAIN DESCRIPTION - LOCATION
LOCATION: HEAD
LOCATION: NECK
LOCATION: LEG
LOCATION: HEAD

## 2024-04-18 NOTE — PLAN OF CARE
See OT evaluation for all goals and OT POC. Electronically signed by Carrie Guerra OTR/L on 4/18/2024 at 12:27 PM

## 2024-04-18 NOTE — ACP (ADVANCE CARE PLANNING)
Advance Care Planning   Healthcare Decision Maker:    Primary Decision Maker: PawanThuy - Child - [phone]    Secondary Decision Maker: Jonas Hooper - Child - [phone]    Secondary Decision Maker: Justin Hooper - Child - [phone]    Click here to complete Healthcare Decision Makers including selection of the Healthcare Decision Maker Relationship (ie \"Primary\").  Today we documented Decision Maker(s) consistent with Legal Next of Kin hierarchy.       If the relationship to the patient does NOT follow our state's Next of Kin hierarchy, the patient MUST complete an ACP Document to allow him/her to act on the patient's behalf. :

## 2024-04-18 NOTE — PLAN OF CARE
Problem: Pain  Goal: Verbalizes/displays adequate comfort level or baseline comfort level  Outcome: Progressing     Problem: Safety - Adult  Goal: Free from fall injury  Outcome: Progressing     Problem: ABCDS Injury Assessment  Goal: Absence of physical injury  Outcome: Progressing     Problem: Skin/Tissue Integrity  Goal: Absence of new skin breakdown  Description: 1.  Monitor for areas of redness and/or skin breakdown  2.  Assess vascular access sites hourly  3.  Every 4-6 hours minimum:  Change oxygen saturation probe site  4.  Every 4-6 hours:  If on nasal continuous positive airway pressure, respiratory therapy assess nares and determine need for appliance change or resting period.  Outcome: Progressing     Problem: Discharge Planning  Goal: Discharge to home or other facility with appropriate resources  Outcome: Progressing

## 2024-04-18 NOTE — PROGRESS NOTES
Pt's IV was taken out of her right arm due to it leaking, multiple nurses attempted to enter in another IV but wasn't successful. Pt stated her right upper arm is restricted from IV's and Bps but the band is on her left, not sure why it is but she does have a big knot protruding out of her skin with a bandage covering it on her right upper arm. At this time we are searching for someone to put an IV in because although she is not on fluids she is on IV ATB.

## 2024-04-18 NOTE — CARE COORDINATION
Case Management Assessment  Initial Evaluation    Date/Time of Evaluation: 4/18/2024 3:54 PM  Assessment Completed by: VINH Mackenzie    If patient is discharged prior to next notation, then this note serves as note for discharge by case management.    Patient Name: Paola Childers                   YOB: 1935  Diagnosis: Fall, initial encounter [W19.XXXA]  Closed fracture of neck of left femur, initial encounter ([facility]) [S72.002A]  Closed left hip fracture, initial encounter ([facility]) [S72.002A]                   Date / Time: 4/16/2024  8:20 PM    Patient Admission Status: Inpatient   Readmission Risk (Low < 19, Mod (19-27), High > 27): Readmission Risk Score: 11.1    Current PCP: John Christie MD  PCP verified by CM? Yes    Chart Reviewed: Yes      History Provided by: Patient, Child/Family  Patient Orientation: Alert and Oriented    Patient Cognition: Alert    Hospitalization in the last 30 days (Readmission):  No    If yes, Readmission Assessment in  Navigator will be completed.    Advance Directives:      Code Status: DNR-CCA   Patient's Primary Decision Maker is: Legal Next of Kin    Primary Decision Maker: Thuy Villalta - Child - [phone]    Secondary Decision Maker: Jonas Hooper - Child - [phone]    Secondary Decision Maker: Justin Hooper  Child - [phone]    Discharge Planning:    Patient lives with: Alone Type of Home: House  Primary Care Giver: Self  Patient Support Systems include: Children   Current Financial resources:    Current community resources:    Current services prior to admission: None            Current DME:              Type of Home Care services:  Safety Alert    ADLS  Prior functional level: Independent in ADLs/IADLs  Current functional level: Assistance with the following:, Bathing, Dressing, Toileting, Cooking, Housework, Shopping, Mobility    PT AM-PAC: 9 /24  OT AM-PAC: 14 /24    Family can provide assistance at DC: Yes  Would you like Case Management to

## 2024-04-18 NOTE — PLAN OF CARE
Problem: Pain  Goal: Verbalizes/displays adequate comfort level or baseline comfort level  4/18/2024 1220 by Rhoda Cramer RN  Outcome: Progressing  4/18/2024 0405 by Segun Parmar RN  Outcome: Progressing     Problem: Safety - Adult  Goal: Free from fall injury  4/18/2024 1220 by Rhoda Cramer RN  Outcome: Progressing  4/18/2024 0405 by Segun Parmar RN  Outcome: Progressing     Problem: ABCDS Injury Assessment  Goal: Absence of physical injury  4/18/2024 0405 by Segun Parmar RN  Outcome: Progressing

## 2024-04-18 NOTE — PROGRESS NOTES
[facility] OCCUPATIONAL THERAPY EVALUATION - ACUTE     NAME: Paola Childers  : 1935 (89 y.o.)  MRN: [id number]  CODE STATUS: DNR-CCA  Room: W269/W269-01    Date of Service: 2024    Patient Diagnosis(es): Fall, initial encounter [W19.XXXA]  Closed fracture of neck of left femur, initial encounter ([facility]) [S72.002A]  Closed left hip fracture, initial encounter ([facility]) [S72.002A]   Patient Active Problem List    Diagnosis Date Noted    Anxiety 10/14/2018    Hypertensive urgency 10/12/2018    Chronic fatigue 10/12/2018    Closed left hip fracture, initial encounter ([facility]) 2024    Fall 2024    Nausea & vomiting 2019    Angina at rest ([facility]) 10/12/2018        Past Medical History:   Diagnosis Date    Anxiety     Hernia of abdominal cavity     Hypertension      Past Surgical History:   Procedure Laterality Date    ESOPHAGUS SURGERY      FLIPPED STOMACH        Restrictions  Restrictions/Precautions: Up as Tolerated, Weight Bearing, Fall Risk      Left Lower Extremity Weight Bearing: Weight Bearing As Tolerated  Hip Precautions: Posterior hip precautions    Safety Devices: Safety Devices  Type of Devices: All fall risk precautions in place;Call light within reach;Chair alarm in place;Left in chair     Patient's date of birth confirmed: Yes    General:  Chart Reviewed: Yes  Patient assessed for rehabilitation services?: Yes    Subjective  Subjective: \"I told my daughter I broke my shoulder and my ribs\"- per chart, only fx was femur       Pain at start of treatment: Yes: 510    Pain at end of treatment: Yes: 10    Location: L hip  Description: Sore  Nursing notified: Yes  RN: Rhoda  Intervention: Repositioned    Prior Level of Function:  Social/Functional History  Lives With: Alone  Type of Home: Apartment  Home Layout: One level  Home Access: Level entry  Bathroom Shower/Tub: Tub/Shower unit  Bathroom Equipment: Tub transfer bench  Home Equipment: Rollator, Walker, rolling  ADL Assistance: Needs

## 2024-04-18 NOTE — DISCHARGE INSTR - COC
Continuity of Care Form    Patient Name: Paola Childers   :  1935  MRN:  [id number]    Admit date:  2024  Discharge date:  24    Code Status Order: DNR-CCA   Advance Directives:     Admitting Physician:  Bill Ureña MD  PCP: John Christie MD    Discharging Nurse: RADHA Guerra RN  Discharging Hospital Unit/Room#: W269/W269-01  Discharging Unit Phone Number: [phone]    Emergency Contact:   Extended Emergency Contact Information  Primary Emergency Contact: Thuy Villalta   [facility]  Home Phone: [phone]  Relation: Child  Secondary Emergency Contact: Jonas Hooper   [facility]  Home Phone: [phone]  Relation: Child    Past Surgical History:  Past Surgical History:   Procedure Laterality Date    ESOPHAGUS SURGERY      FLIPPED STOMACH       Immunization History:   Immunization History   Administered Date(s) Administered    COVID-19, MODERNA BLUE border, Primary or Immunocompromised, (age 12y+), IM, 100 mcg/0.5mL 2021, 2021    Influenza, Quadv, 6 mo and older, IM, PF (Flulaval, Fluarix) 10/14/2018       Active Problems:  Patient Active Problem List   Diagnosis Code    Angina at rest ([facility]) I20.89    Hypertensive urgency I16.0    Chronic fatigue R53.82    Anxiety F41.9    Nausea & vomiting R11.2    Closed left hip fracture, initial encounter ([facility]) S72.002A    Fall W19.XXXA       Isolation/Infection:   Isolation            No Isolation          Patient Infection Status       None to display            Nurse Assessment:  Last Vital Signs: BP (!) 137/58   Pulse 90   Temp 97.7 °F (36.5 °C) (Oral)   Resp 18   Ht 1.676 m (5' 5.98\")   Wt 86.2 kg (190 lb)   SpO2 99%   BMI 30.68 kg/m²     Last documented pain score (0-10 scale): Pain Level: 3  Last Weight:   Wt Readings from Last 1 Encounters:   24 86.2 kg (190 lb)     Mental Status:  oriented and alert    IV Access:  - None    Nursing Mobility/ADLs:  Walking   Assisted  Transfer  Assisted  Bathing

## 2024-04-18 NOTE — PROGRESS NOTES
Assistance  Stand to Sit: Maximum Assistance;2 Person Assistance  Comment: Hand over hand assist provided throughout. Pt slow to complete. Signfiicant lifting assist provided.    Ambulation  Comments: DNT. pt unwilling.                   PT Exercises  A/AROM Exercises: supine AP/QS/GS/HS x10 BLE.          Activity Tolerance  Activity Tolerance: Patient tolerated treatment well          ASSESSMENT   Assessment: pt denies pain during session, significant assistance needed for all mobility, pt gives good effort. unable to recall any hip precautions, education provided.     Discharge Recommendations:  Continue to assess pending progress, Therapy recommended at discharge         Goals  Long Term Goals  Long Term Goal 1: Pt to complete bed mobility with Rowan  Long Term Goal 2: Pt to complete transfers with Rowan  Long Term Goal 3: Pt to ambulate 10-25ft with LRD and Rowan  Long Term Goal 4: Pt to state 3/3 hip precautions    PLAN    General Plan: 2 times a day 7 days a week  Safety Devices  Type of Devices: Call light within reach, Bed alarm in place, Left in bed     AMPAC (6 CLICK) BASIC MOBILITY  AM-PAC Inpatient Mobility Raw Score : 9      Therapy Time   Individual   Time In 1400   Time Out 1423   Minutes 23     BM/trsf: 15  Therex: 8     Sheldon Burt PTA, 04/18/24 at 2:52 PM         Definitions for assistance levels  Independent = pt does not require any physical supervision or assistance from another person for activity completion. Device may be needed.  Stand by assistance = pt requires verbal cues or instructions from another person, close to but not touching, to perform the activity  Minimal assistance= pt performs 75% or more of the activity; assistance is required to complete the activity  Moderate assistance= pt performs 50% of the activity; assistance is required to complete the activity  Maximal assistance = pt performs 25% of the activity; assistance is required to complete the activity  Dependent = pt  requires total physical assistance to accomplish the task

## 2024-04-18 NOTE — PROGRESS NOTES
Hip surgery    Progress Note    Subjective:     Post-Operative Day: 1 Status Post left Hip haven Arthroplasty  Systemic or Specific Complaints:No Complaints    Objective:     CURRENT VITALS:  BP (!) 141/66   Pulse 82   Temp 98.6 °F (37 °C) (Oral)   Resp 18   Ht 1.676 m (5' 5.98\")   Wt 86.2 kg (190 lb)   SpO2 100%   BMI 30.68 kg/m²     General: alert, appears stated age, and cooperative   Wound: Wound clean and dry no evidence of infection.   Motion: Pt has not been out of bed yet     DVT Exam: No evidence of DVT seen on physical exam.       NVI in lower extremity. Thigh swollen but soft. Moving foot and ankle.      Data Review  Recent Labs     04/16/24  2045 04/18/24  0448   WBC 9.1 13.0*   RBC 3.89* 3.60*   HGB 12.1 11.0*   HCT 37.2 34.1*   MCV 95.6* 94.7   MCH 31.1 30.6   MCHC 32.5* 32.3*   RDW 13.0 13.1    166       Assessment:     Status Post left Hip haven Arthroplasty. Doing well postoperatively. Pt remain A&Ox3   She is getting ready to work with therapy  Her pain is controlled and she  states over all she feels  better      Plan:      1: Continues current post-op course :  2:  Continue Deep venous thrombosis prophylaxis  3:  start physical therapy  4:  Continue Pain Control

## 2024-04-18 NOTE — PROGRESS NOTES
(Daughter or DIL assist with showers)  Bath:  (Family assist)  Homemaking Assistance:  (Microwaves meals, daughter does cleaning and laundry)  Ambulation Assistance: Independent (Rollator)  Transfer Assistance: Independent  Active : No  Patient's  Info: Daughter or sons  Occupation: Retired    OBJECTIVE:   Vision  Vision: Impaired  Vision Exceptions: Wears glasses at all times  Hearing: Exceptions to WFL  Hearing Exceptions: Hard of hearing/hearing concerns;No hearing aid    Cognition:  Overall Orientation Status: Within Functional Limits  Follows Commands: Within Functional Limits  Overall Cognitive Status: Exceptions  Cognition Comment: Increased processing time, at times requires extended time to answer and follow directions    Observation/Palpation  Observation: Bruising to RUE, R shoulder, wound vac noted not to be suctioned. RN Rhoda notified and assessed. Hip abductor pillow removed for mobility. Pt on 2L O2 on NC    ROM:  AROM: Generally decreased, functional    Strength:  Strength: Grossly decreased, non-functional    Neuro:  Balance  Sitting - Static: Fair;+  Sitting - Dynamic: Fair  Standing - Static: Poor;+  Standing - Dynamic: Poor;+                      Tone: Normal  Coordination: Generally decreased, functional    Sensation: Intact    Bed mobility  Supine to Sit: Dependent/Total;2 Person assistance  Scooting: Dependent/Total  Bed Mobility Comments: Increased time and effort, poor sequencing. Dizziness upon sitting.    Transfers  Sit to Stand: Maximum Assistance;Dependent/Total;2 Person Assistance  Stand to Sit: Maximum Assistance;Dependent/Total;2 Person Assistance  Bed to Chair: Maximum assistance;Dependent/Total;2 Person Assistance  Comment: Hand over hand assist provided throughout. Pt slow to complete. Signfiicant lifting assist provided.    Ambulation  Surface: Level tile  Device: Rolling Walker  Assistance: Maximum assistance;2 Person assistance  Quality of Gait: Slow, step by step  Rowan  Long Term Goal 2: Pt to complete transfers with Rowan  Long Term Goal 3: Pt to ambulate 10-25ft with LRD and Rowan  Long Term Goal 4: Pt to state 3/3 hip precautions    [facility] (6 CLICK) BASIC MOBILITY  AM-PAC Inpatient Mobility Raw Score : 6     Therapy Time:   Individual   Time In 0923   Time Out 0950   Minutes 27   Timed Code Treatment Minutes: 11 Minutes (transfers)       Su Hicks, PT, 04/18/24 at 1:04 PM         Definitions for assistance levels  Independent = pt does not require any physical supervision or assistance from another person for activity completion. Device may be needed.  Stand by assistance = pt requires verbal cues or instructions from another person, close to but not touching, to perform the activity  Minimal assistance= pt performs 75% or more of the activity; assistance is required to complete the activity  Moderate assistance= pt performs 50% of the activity; assistance is required to complete the activity  Maximal assistance = pt performs 25% of the activity; assistance is required to complete the activity  Dependent = pt requires total physical assistance to accomplish the task

## 2024-04-18 NOTE — PROGRESS NOTES
TRAUMA DAILY PROGRESS NOTE      Patient Name: Paola Childers  Admission Date 2024    Hospital Day: 1  Patient seen and examined on 2024    INTERVAL HISTORY/EVENTS     Background:  Paola Childers is a 89 y.o. female with a PMHx of HTN presented to [facility] ED on 2024 s/p fall from standing at home while closing her blinds. (+) head, (-) LOC, (-) AC/AP.     Trauma work up revealed left femoral neck fracture. She was subsequently admitted to our trauma service. Ortho consulted.      Hospital Course:  2024: s/p fall from standing on . (+) head strike, (-) LOC, (-) AC/AP. Admitted to trauma. Ortho consulted.  2024: Cardiac Echo obtained. OR with Ortho for left hip hemiarthroplasty.    24 Hour Events:  POD#1 s/p left hip hemiarthroplasty (Dr. Arrington, ). Patient with adequate pain control on current regimen. She was able to transfer to bedside chair this AM with PT/OT. SNF recommended and patient agreeable. She has poor appetite at baseline but is tolerating PO. Agreeable to PO supplements. Voiding spontaneously. No BM since admission.    Labs: leukocytosis at 13 (9.1). H/H downtrend to 11.0/34.1 (12.1/37.2). BUN slight increase to 25 (15). sCr WNL and electrolytes WNL.     Vitals: afebrile. VSS on 2L via NC (no O2 at baseline)      PHYSICAL EXAM     Vitals Average, Min and Max for last 24 hours:  Temp: Temp: 97.7 °F (36.5 °C); Temp  Av °F (36.7 °C)  Min: 97.6 °F (36.4 °C)  Max: 98.6 °F (37 °C)  Respirations: Resp  Avg: 15.8  Min: 11  Max: 18  Pulse: Pulse  Av.5  Min: 76  Max: 95  Blood Pressure: Systolic (24hrs), Av , Min:126 , Max:156   ; Diastolic (24hrs), Av, Min:50, Max:66    SpO2: SpO2  Av.5 %  Min: 97 %  Max: 100 %    24hr Intake/Output:   Intake/Output Summary (Last 24 hours) at 2024 1504  Last data filed at 2024 0630  Gross per 24 hour   Intake --   Output 300 ml   Net -300 ml       Vitals: BP (!) 137/58   Pulse 90   Temp 97.7 °F (36.5 °C)    Component Value Date/Time    MG 2.2 04/18/2024 04:47 AM     PT/INR:    Lab Results   Component Value Date/Time    PROTIME 13.9 04/16/2024 08:45 PM    INR 1.0 04/16/2024 08:45 PM     PTT:    Lab Results   Component Value Date/Time    APTT 36.4 04/16/2024 08:45 PM    PTT 33.8 12/24/2012 08:07 AM       IMAGING RESULTS (PERSONALLY REVIEWED)     All admission and follow up imaging reviewed.    ASSESSMENT & PLAN     Diagnoses:  S/p fall from standing on 4/16/2024  Left femoral neck fracture (s/p  left hip hemiarthroplasty with Dr. Arrington on 4/17/24)  Acute pain due to traumatic injury  Post-operative pain  Reactive leukocytosis  Acute blood loss anemia      PMHx: HTN    Incidental Findings: None, JLR 4/17/2024      ASSESSMENT/PLAN:  Neurological: Acute pain due to traumatic injury, post-operative pain.  - Continue Tylenol 650mg q6hr scheduled  - Continue Robaxin 500mg four times daily prn for muscle spasms  - Continue Oxycodone 2.5/5mg q4hrs prn moderate/severe pain  - Discontinue IV dilaudid  - Continue Lidoderm patches     Cardiovascular: No acute issues. PMHx of HTN.  - Preop and Cardiac echo obtained. No issues. EF of 55 - 60%   - OK to discontinue cardiac telemetry monitoring     Respiratory: No acute issues.  - Maintain O2 sats > 92%  - Encourage IS 10x hourly     GI/Diet:   - Continue regular diet  - Bowel regimen     Renal/Electrolytes: No acute issues.  - HLIV  - BMP and Mag in am     ID: No active infection. Remains afebrile, normotensive. Leukocytosis likely reactive in setting of OR yesterday.  - Periop Abx completed today  - CBC in am     Heme: HDS.  - No indication for blood transfusion  - CBC in am     Endocrine:   - No glycemic issues.     MSK: Left femoral neck fracture (s/p left hip hemiarthroplasty with Dr. Arrington on 4/17/24). S/p fall from standing at home on 4/16/2024.  - Ortho consulted:s/p left hip hemiarthroplasty with Dr. Arrington on 4/17/24  - Spines: Cleared  - Weight Bearing Restrictions: WBAt LLE

## 2024-04-19 ENCOUNTER — APPOINTMENT (OUTPATIENT)
Dept: CT IMAGING | Age: 89
End: 2024-04-19
Payer: MEDICARE

## 2024-04-19 ENCOUNTER — APPOINTMENT (OUTPATIENT)
Dept: PRIMARY CARE | Facility: CLINIC | Age: 89
End: 2024-04-19
Payer: MEDICARE

## 2024-04-19 LAB
ANION GAP SERPL CALCULATED.3IONS-SCNC: 7 MEQ/L (ref 9–15)
BASOPHILS # BLD: 0 K/UL (ref 0–0.2)
BASOPHILS NFR BLD: 0.2 %
BUN SERPL-MCNC: 24 MG/DL (ref 8–23)
CALCIUM SERPL-MCNC: 9 MG/DL (ref 8.5–9.9)
CHLORIDE SERPL-SCNC: 105 MEQ/L (ref 95–107)
CO2 SERPL-SCNC: 26 MEQ/L (ref 20–31)
CREAT SERPL-MCNC: 0.46 MG/DL (ref 0.5–0.9)
EOSINOPHIL # BLD: 0.1 K/UL (ref 0–0.7)
EOSINOPHIL NFR BLD: 0.8 %
ERYTHROCYTE [DISTWIDTH] IN BLOOD BY AUTOMATED COUNT: 13.2 % (ref 11.5–14.5)
GLUCOSE SERPL-MCNC: 110 MG/DL (ref 70–99)
HCT VFR BLD AUTO: 29.3 % (ref 37–47)
HCT VFR BLD AUTO: 30 % (ref 37–47)
HGB BLD-MCNC: 10 G/DL (ref 12–16)
HGB BLD-MCNC: 9.8 G/DL (ref 12–16)
LYMPHOCYTES # BLD: 2 K/UL (ref 1–4.8)
LYMPHOCYTES NFR BLD: 18.9 %
MAGNESIUM SERPL-MCNC: 2.1 MG/DL (ref 1.7–2.4)
MCH RBC QN AUTO: 31.4 PG (ref 27–31.3)
MCHC RBC AUTO-ENTMCNC: 33.4 % (ref 33–37)
MCV RBC AUTO: 93.9 FL (ref 79.4–94.8)
MONOCYTES # BLD: 0.9 K/UL (ref 0.2–0.8)
MONOCYTES NFR BLD: 8.7 %
NEUTROPHILS # BLD: 7.4 K/UL (ref 1.4–6.5)
NEUTS SEG NFR BLD: 70.8 %
PLATELET # BLD AUTO: 150 K/UL (ref 130–400)
POTASSIUM SERPL-SCNC: 3.8 MEQ/L (ref 3.4–4.9)
RBC # BLD AUTO: 3.12 M/UL (ref 4.2–5.4)
SODIUM SERPL-SCNC: 138 MEQ/L (ref 135–144)
WBC # BLD AUTO: 10.4 K/UL (ref 4.8–10.8)

## 2024-04-19 PROCEDURE — 94150 VITAL CAPACITY TEST: CPT

## 2024-04-19 PROCEDURE — 72128 CT CHEST SPINE W/O DYE: CPT

## 2024-04-19 PROCEDURE — 83735 ASSAY OF MAGNESIUM: CPT

## 2024-04-19 PROCEDURE — 1210000000 HC MED SURG R&B

## 2024-04-19 PROCEDURE — 85014 HEMATOCRIT: CPT

## 2024-04-19 PROCEDURE — 6360000002 HC RX W HCPCS: Performed by: NURSE PRACTITIONER

## 2024-04-19 PROCEDURE — 80048 BASIC METABOLIC PNL TOTAL CA: CPT

## 2024-04-19 PROCEDURE — 6370000000 HC RX 637 (ALT 250 FOR IP): Performed by: NURSE PRACTITIONER

## 2024-04-19 PROCEDURE — 6370000000 HC RX 637 (ALT 250 FOR IP): Performed by: PHYSICIAN ASSISTANT

## 2024-04-19 PROCEDURE — 85018 HEMOGLOBIN: CPT

## 2024-04-19 PROCEDURE — 6360000002 HC RX W HCPCS: Performed by: PHYSICIAN ASSISTANT

## 2024-04-19 PROCEDURE — 72131 CT LUMBAR SPINE W/O DYE: CPT

## 2024-04-19 PROCEDURE — 85025 COMPLETE CBC W/AUTO DIFF WBC: CPT

## 2024-04-19 PROCEDURE — 36415 COLL VENOUS BLD VENIPUNCTURE: CPT

## 2024-04-19 PROCEDURE — 97535 SELF CARE MNGMENT TRAINING: CPT

## 2024-04-19 PROCEDURE — 2580000003 HC RX 258: Performed by: NURSE PRACTITIONER

## 2024-04-19 RX ORDER — OXYCODONE HYDROCHLORIDE 5 MG/1
5 TABLET ORAL EVERY 6 HOURS PRN
Qty: 28 TABLET | Refills: 0 | Status: SHIPPED | OUTPATIENT
Start: 2024-04-19 | End: 2024-04-22

## 2024-04-19 RX ORDER — ENOXAPARIN SODIUM 100 MG/ML
30 INJECTION SUBCUTANEOUS 2 TIMES DAILY
Qty: 18 ML | Refills: 0
Start: 2024-04-19 | End: 2024-04-20 | Stop reason: HOSPADM

## 2024-04-19 RX ORDER — AMLODIPINE BESYLATE 2.5 MG/1
2.5 TABLET ORAL DAILY
Status: DISCONTINUED | OUTPATIENT
Start: 2024-04-19 | End: 2024-04-20 | Stop reason: HOSPADM

## 2024-04-19 RX ORDER — METHOCARBAMOL 500 MG/1
500 TABLET, FILM COATED ORAL 4 TIMES DAILY PRN
Qty: 30 TABLET | Refills: 0
Start: 2024-04-19 | End: 2024-04-22

## 2024-04-19 RX ORDER — PANTOPRAZOLE SODIUM 40 MG/1
40 TABLET, DELAYED RELEASE ORAL
Status: DISCONTINUED | OUTPATIENT
Start: 2024-04-19 | End: 2024-04-20 | Stop reason: HOSPADM

## 2024-04-19 RX ADMIN — ACETAMINOPHEN 650 MG: 325 TABLET ORAL at 16:09

## 2024-04-19 RX ADMIN — ACETAMINOPHEN 650 MG: 325 TABLET ORAL at 04:50

## 2024-04-19 RX ADMIN — ACETAMINOPHEN 650 MG: 325 TABLET ORAL at 08:28

## 2024-04-19 RX ADMIN — Medication 10 ML: at 08:28

## 2024-04-19 RX ADMIN — ENOXAPARIN SODIUM 30 MG: 100 INJECTION SUBCUTANEOUS at 20:26

## 2024-04-19 RX ADMIN — HYDROMORPHONE HYDROCHLORIDE 0.25 MG: 1 INJECTION, SOLUTION INTRAMUSCULAR; INTRAVENOUS; SUBCUTANEOUS at 12:16

## 2024-04-19 RX ADMIN — HYDROXYZINE HYDROCHLORIDE 10 MG: 10 TABLET ORAL at 20:26

## 2024-04-19 RX ADMIN — Medication 5 ML: at 20:26

## 2024-04-19 RX ADMIN — ACETAMINOPHEN 650 MG: 325 TABLET ORAL at 20:26

## 2024-04-19 RX ADMIN — ENOXAPARIN SODIUM 30 MG: 100 INJECTION SUBCUTANEOUS at 08:28

## 2024-04-19 RX ADMIN — AMLODIPINE BESYLATE 2.5 MG: 2.5 TABLET ORAL at 17:33

## 2024-04-19 RX ADMIN — PANTOPRAZOLE SODIUM 40 MG: 40 TABLET, DELAYED RELEASE ORAL at 17:33

## 2024-04-19 ASSESSMENT — PAIN DESCRIPTION - DESCRIPTORS
DESCRIPTORS: ACHING

## 2024-04-19 ASSESSMENT — PAIN SCALES - GENERAL
PAINLEVEL_OUTOF10: 5
PAINLEVEL_OUTOF10: 2
PAINLEVEL_OUTOF10: 6
PAINLEVEL_OUTOF10: 5
PAINLEVEL_OUTOF10: 5

## 2024-04-19 ASSESSMENT — PAIN DESCRIPTION - LOCATION
LOCATION: HIP
LOCATION: HIP
LOCATION: BACK
LOCATION: BACK;HIP

## 2024-04-19 ASSESSMENT — PAIN DESCRIPTION - ORIENTATION
ORIENTATION: MID
ORIENTATION: LEFT

## 2024-04-19 NOTE — PROGRESS NOTES
Physical Therapy Med Surg Daily Treatment Note  [facility]ility[facility]Department[facility]  [facility]       NAME: Paola Childers  : 1935 (89 y.o.)  MRN: [id number]  CODE STATUS: DNR-CCA    Date of Service: 2024    Patient Diagnosis(es): Fall, initial encounter [W19.XXXA]  Closed fracture of neck of left femur, initial encounter ([facility]) [S72.002A]  Closed left hip fracture, initial encounter ([facility]) [S72.002A]   Chief Complaint   Patient presents with    Fall    Hip Pain    Shoulder Pain     Patient Active Problem List    Diagnosis Date Noted    Anxiety 10/14/2018    Hypertensive urgency 10/12/2018    Chronic fatigue 10/12/2018    Closed left hip fracture, initial encounter ([facility]) 2024    Fall 2024    Nausea & vomiting 2019    Angina at rest ([facility]) 10/12/2018        Past Medical History:   Diagnosis Date    Anxiety     Hernia of abdominal cavity     Hypertension      Past Surgical History:   Procedure Laterality Date    ESOPHAGUS SURGERY      FLIPPED STOMACH    TOTAL HIP ARTHROPLASTY Left 2024    LEFT HIP GREGORIO ARTHROPLASTY performed by Edd Arrington DO at [facility]       Chart Reviewed: Yes  Family / Caregiver Present: Yes    Restrictions:  Restrictions/Precautions: Up as Tolerated;Weight Bearing;Fall Risk  Lower Extremity Weight Bearing Restrictions  Left Lower Extremity Weight Bearing: Weight Bearing As Tolerated  Position Activity Restriction  Hip Precautions: Posterior hip precautions    SUBJECTIVE:   Subjective: \"I am ready to get back to bed.\"    Pain  Pain: pt reports 0/10 pain at rest. 9/10 pain in L shoulder, back and L hip. with movement.    OBJECTIVE:        Bed mobility  Supine to Sit: Maximum assistance;2 Person assistance  Sit to Supine: Maximum assistance;2 Person assistance  Bed Mobility Comments: Increased time and effort, poor sequencing and follow through of cues. pt rigid throughout transition.    Transfers  Sit to Stand: Maximum Assistance;2 Person Assistance  Stand  activity  Dependent = pt requires total physical assistance to accomplish the task

## 2024-04-19 NOTE — PROGRESS NOTES
Hip surgery    Progress Note    Subjective:     Post-Operative Day: 2 Status Post left Hip haven Arthroplasty  Systemic or Specific Complaints:No Complaints    Objective:     CURRENT VITALS:  /61   Pulse 76   Temp 99 °F (37.2 °C) (Oral)   Resp 16   Ht 1.676 m (5' 5.98\")   Wt 86.2 kg (190 lb)   SpO2 100%   BMI 30.68 kg/m²     General: alert, appears stated age, and cooperative   Wound: Wound clean and dry no evidence of infection.   Motion: Painful range of Motion   DVT Exam: No evidence of DVT seen on physical exam.       NVI in lower extremity. Thigh swollen but soft. Moving foot and ankle.      Data Review  Recent Labs     04/16/24 2045 04/18/24 0448 04/19/24 0449   WBC 9.1 13.0* 10.4   RBC 3.89* 3.60* 3.12*   HGB 12.1 11.0* 9.8*   HCT 37.2 34.1* 29.3*   MCV 95.6* 94.7 93.9   MCH 31.1 30.6 31.4*   MCHC 32.5* 32.3* 33.4   RDW 13.0 13.1 13.2    166 150       Assessment:     Status Post left Hip haven Arthroplasty. Doing well postoperatively.    We changed pt's dressing today form previna sponge to aquacel   Ortho orders in chart  Ok for rehab from ortho perspective- once medically stable  Follow up in 2 weeks    Plan:      1: Continues current post-op course- and as above :  2:  Continue Deep venous thrombosis prophylaxis  3:  Continue physical therapy  4:  Continue Pain Control

## 2024-04-19 NOTE — DISCHARGE INSTRUCTIONS
Hip Replacement  Discharge Instructions    To prevent Clot formation, you have been placed on the following medication:  Aspirin 81mg twice daily for 6 weeks  Surgical Site Care:  Dressing change every days and PRN (as needed) with 4 x 4 and porous tape. No betadine. If glue is present, leave open to air  If Aquacel Ag (rubber) dressing is present, do not remove dressing for 7 days- applied 4/19/24, unless heavily saturated. If heavily saturated, remove dressing and start daily dressing changes as described above   if Staples  will be removed on post-operative day 14 and steri-strips applied  Showering is permitted starting POD1 if waterproof aquacell dressing is present or when incision is covered with waterproof dressing, such as 4 x 4 and tegaderm  Physical Therapy:  Weight Bearing Status:  WBAT   Hip Precautions  Posterior hip precautions  Pain Medications  You were given pain meds  Wean off pain medications as you deem appropriate as long as pain is under control  Cold packs/Ice packs/Machine  May be used 3 times daily for 15-30 minutes as necessary  Be sure to have a barrier (cloth, clothing, towel) between the site and the ice pack to prevent frostbite  Contact Orthopedics office if  Increased redness, swelling, drainage of any kind, and/or pain to surgery site.  As well as new onset fevers and or chills.  These could signify an infection.  Calf or thigh tenderness to touch as well as increased swelling or redness.  This could signify a clot formation.  Numbness or tingling to an area around the incision site or below the incision site (toes).  Any rash appears, increased  or new onset nausea/vomiting occur.  This may indicate a reaction to a medication.     Follow up with Surgeon, Dr Arrington  I acknowledge that I have received dipak hose and understand the instructions on how and when to wear them (on during the day off at night)   Discharging RN who has gone over instructions and acknowledges dipak hoschristian have

## 2024-04-19 NOTE — CARE COORDINATION
We have precert approval for the pt to transfer to [facility].  [facility] arranged.

## 2024-04-19 NOTE — PROGRESS NOTES
Physical Therapy Med Surg Daily Treatment Note  Facility/Department: [facility] 2W ORTHO TELE  Room: [place] [id number]       NAME: Paola Childers  : 1935 (89 y.o.)  MRN: [id number]  CODE STATUS: DNR-CCA    Date of Service: 2024    Patient Diagnosis(es): Fall, initial encounter [W19.XXXA]  Closed fracture of neck of left femur, initial encounter ([facility]) [S72.002A]  Closed left hip fracture, initial encounter ([facility]) [S72.002A]   Chief Complaint   Patient presents with    Fall    Hip Pain    Shoulder Pain     Patient Active Problem List    Diagnosis Date Noted    Anxiety 10/14/2018    Hypertensive urgency 10/12/2018    Chronic fatigue 10/12/2018    Closed left hip fracture, initial encounter ([facility]) 2024    Fall 2024    Nausea & vomiting 2019    Angina at rest ([facility]) 10/12/2018        Past Medical History:   Diagnosis Date    Anxiety     Hernia of abdominal cavity     Hypertension      Past Surgical History:   Procedure Laterality Date    ESOPHAGUS SURGERY      FLIPPED STOMACH       Chart Reviewed: Yes  Family / Caregiver Present: Yes (at end of session.)    Restrictions:  Restrictions/Precautions: Up as Tolerated;Weight Bearing;Fall Risk  Lower Extremity Weight Bearing Restrictions  Left Lower Extremity Weight Bearing: Weight Bearing As Tolerated  Position Activity Restriction  Hip Precautions: Posterior hip precautions    SUBJECTIVE:   Subjective: pt agreeable to tx.    Pain  Pain: pt reports 0/10 pain at rest. 9/10 pain in L shoulder, back and L hip. with movement.    OBJECTIVE:        Bed mobility  Supine to Sit: Maximum assistance;2 Person assistance  Sit to Supine:  (DNT pt into bedside chair to promote OOB activity.)  Bed Mobility Comments: Increased time and effort, poor sequencing and follow through of cues. pt rigid throughout transition.    Transfers  Sit to Stand: Maximum Assistance;2 Person Assistance  Stand to Sit: Maximum Assistance;2 Person Assistance  Bed to Chair: Maximum assistance;2

## 2024-04-19 NOTE — PROGRESS NOTES
TRAUMA DAILY PROGRESS NOTE      Patient Name: Paola Childers  Admission Date 2024    Hospital Day: 2  Patient seen and examined on 2024    INTERVAL HISTORY/EVENTS     Background:  Paola Childers is a 89 y.o. female with a PMHx of HTN presented to [facility] ED on 2024 s/p fall from standing at home while closing her blinds. (+) head, (-) LOC, (-) AC/AP.     Trauma work up revealed left femoral neck fracture. She was subsequently admitted to our trauma service. Ortho consulted.      Hospital Course:  2024: s/p fall from standing on . (+) head strike, (-) LOC, (-) AC/AP. Admitted to trauma. Ortho consulted.  2024: Cardiac Echo obtained. OR with Ortho for left hip hemiarthroplasty.  2024: Hgb with post-operative downtrend. Pain well controlled. PT/OT rec SNF.    24 Hour Events:  POD#2 s/p left hip hemiarthroplasty (Dr. Arrington, ). Patient with adequate pain control on current regimen. Patient again transferred to bedside chair this . SNF recommended and patient agreeable. She has poor appetite at baseline but is tolerating PO. Agreeable to PO supplements. Voiding spontaneously. No BM since admission.    Labs: leukocytosis resolved. Hgb stabilized in afternoon at 10 (9.8). BUN/Cr WNL. Mg 2.1    Vitals: afebrile. VSS on RA      PHYSICAL EXAM     Vitals Average, Min and Max for last 24 hours:  Temp: Temp: 98.2 °F (36.8 °C); Temp  Av.7 °F (37.1 °C)  Min: 98.2 °F (36.8 °C)  Max: 99.1 °F (37.3 °C)  Respirations: Resp  Av  Min: 16  Max: 18  Pulse: Pulse  Av  Min: 76  Max: 91  Blood Pressure: Systolic (24hrs), Av , Min:111 , Max:136   ; Diastolic (24hrs), Av, Min:41, Max:61    SpO2: SpO2  Av.5 %  Min: 94 %  Max: 100 %    24hr Intake/Output: No intake or output data in the 24 hours ending 24 1514      Vitals: BP (!) 111/53   Pulse 79   Temp 98.2 °F (36.8 °C) (Oral)   Resp 18   Ht 1.676 m (5' 5.98\")   Wt 86.2 kg (190 lb)   SpO2 94%   BMI 30.68 kg/m²      Physical Exam:  Constitutional: seen sitting up in bedside chair earlier in day. Otherwise, laying down comfortably in hospital bed. Multiple family members at bedside throughout the day  HEENT: Atraumatic normocephalic.  Cardiovascular: Regular rate and rhythm. DP/PT 2+ BLE  Pulmonary: Breaths unlabored on RA. No wheezing, rhonchi or rales.  Abdominal: Soft. Non-distended. Non-tender.  Musculoskeletal: Left lateral thigh with aquacel dressing (placed today). Left upper leg with soft, compressible compartments. N/V intact distal to surgical site and injury   Neurological: Alert, awake, and orientated x 3. Motor and sensory grossly intact. No focal deficits. GCS of 15.      LABORATORY RESULTS (LAST 24 HOURS)     CBC with Differential:    Lab Results   Component Value Date/Time    WBC 10.4 04/19/2024 04:49 AM    RBC 3.12 04/19/2024 04:49 AM    HGB 10.0 04/19/2024 01:25 PM    HCT 30.0 04/19/2024 01:25 PM     04/19/2024 04:49 AM    MCV 93.9 04/19/2024 04:49 AM    MCH 31.4 04/19/2024 04:49 AM    MCHC 33.4 04/19/2024 04:49 AM    RDW 13.2 04/19/2024 04:49 AM    LYMPHOPCT 18.9 04/19/2024 04:49 AM    MONOPCT 8.7 04/19/2024 04:49 AM    BASOPCT 0.2 04/19/2024 04:49 AM    MONOSABS 0.9 04/19/2024 04:49 AM    LYMPHSABS 2.0 04/19/2024 04:49 AM    EOSABS 0.1 04/19/2024 04:49 AM    BASOSABS 0.0 04/19/2024 04:49 AM     CMP:    Lab Results   Component Value Date/Time     04/19/2024 04:48 AM    K 3.8 04/19/2024 04:48 AM    K 2.9 05/13/2019 06:04 AM     04/19/2024 04:48 AM    CO2 26 04/19/2024 04:48 AM    BUN 24 04/19/2024 04:48 AM    CREATININE 0.46 04/19/2024 04:48 AM    GFRAA >60.0 06/25/2020 10:15 AM    LABGLOM >90.0 04/19/2024 04:48 AM    GLUCOSE 110 04/19/2024 04:48 AM    PROT 6.8 04/16/2024 08:45 PM    CALCIUM 9.0 04/19/2024 04:48 AM    BILITOT 0.4 04/16/2024 08:45 PM    ALKPHOS 90 04/16/2024 08:45 PM    AST 13 04/16/2024 08:45 PM    ALT 9 04/16/2024 08:45 PM     Magnesium:    Lab Results   Component Value

## 2024-04-20 VITALS
BODY MASS INDEX: 30.53 KG/M2 | HEART RATE: 88 BPM | SYSTOLIC BLOOD PRESSURE: 148 MMHG | HEIGHT: 66 IN | RESPIRATION RATE: 20 BRPM | DIASTOLIC BLOOD PRESSURE: 71 MMHG | OXYGEN SATURATION: 93 % | TEMPERATURE: 97.5 F | WEIGHT: 190 LBS

## 2024-04-20 PROBLEM — G89.18 ACUTE POST-OPERATIVE PAIN: Status: ACTIVE | Noted: 2024-04-20

## 2024-04-20 PROBLEM — M25.512 ACUTE PAIN OF LEFT SHOULDER: Status: ACTIVE | Noted: 2024-04-20

## 2024-04-20 PROBLEM — M54.6 ACUTE MIDLINE THORACIC BACK PAIN: Status: ACTIVE | Noted: 2024-04-20

## 2024-04-20 PROBLEM — D62 ACUTE BLOOD LOSS ANEMIA: Status: ACTIVE | Noted: 2024-04-20

## 2024-04-20 PROBLEM — G89.11 ACUTE PAIN DUE TO TRAUMA: Status: ACTIVE | Noted: 2024-04-20

## 2024-04-20 PROCEDURE — 6370000000 HC RX 637 (ALT 250 FOR IP): Performed by: NURSE PRACTITIONER

## 2024-04-20 PROCEDURE — 6370000000 HC RX 637 (ALT 250 FOR IP): Performed by: PHYSICIAN ASSISTANT

## 2024-04-20 PROCEDURE — 99239 HOSP IP/OBS DSCHRG MGMT >30: CPT | Performed by: PHYSICIAN ASSISTANT

## 2024-04-20 PROCEDURE — 2580000003 HC RX 258: Performed by: NURSE PRACTITIONER

## 2024-04-20 PROCEDURE — 6360000002 HC RX W HCPCS: Performed by: NURSE PRACTITIONER

## 2024-04-20 RX ORDER — ACETAMINOPHEN 325 MG/1
650 TABLET ORAL EVERY 6 HOURS PRN
Qty: 112 TABLET | Refills: 0 | DISCHARGE
Start: 2024-04-20 | End: 2024-05-04

## 2024-04-20 RX ORDER — ASPIRIN 81 MG/1
81 TABLET ORAL 2 TIMES DAILY
Qty: 84 TABLET | Refills: 0 | Status: SHIPPED | OUTPATIENT
Start: 2024-04-20 | End: 2024-04-20

## 2024-04-20 RX ORDER — ASPIRIN 81 MG/1
81 TABLET ORAL DAILY
Qty: 42 TABLET | Refills: 0 | DISCHARGE
Start: 2024-04-20 | End: 2024-04-20

## 2024-04-20 RX ORDER — LISINOPRIL 20 MG/1
20 TABLET ORAL 2 TIMES DAILY
DISCHARGE
Start: 2024-04-20

## 2024-04-20 RX ORDER — ASPIRIN 81 MG/1
81 TABLET ORAL 2 TIMES DAILY
Qty: 84 TABLET | Refills: 0 | Status: SHIPPED | OUTPATIENT
Start: 2024-04-20 | End: 2024-06-01

## 2024-04-20 RX ADMIN — AMLODIPINE BESYLATE 2.5 MG: 2.5 TABLET ORAL at 08:15

## 2024-04-20 RX ADMIN — ENOXAPARIN SODIUM 30 MG: 100 INJECTION SUBCUTANEOUS at 08:15

## 2024-04-20 RX ADMIN — ACETAMINOPHEN 650 MG: 325 TABLET ORAL at 08:14

## 2024-04-20 RX ADMIN — Medication 10 ML: at 08:20

## 2024-04-20 RX ADMIN — PANTOPRAZOLE SODIUM 40 MG: 40 TABLET, DELAYED RELEASE ORAL at 05:21

## 2024-04-20 RX ADMIN — ACETAMINOPHEN 650 MG: 325 TABLET ORAL at 05:21

## 2024-04-20 RX ADMIN — HYDROXYZINE HYDROCHLORIDE 10 MG: 10 TABLET ORAL at 08:15

## 2024-04-20 RX ADMIN — ONDANSETRON 4 MG: 4 TABLET, ORALLY DISINTEGRATING ORAL at 09:48

## 2024-04-20 ASSESSMENT — PAIN DESCRIPTION - LOCATION: LOCATION: HIP

## 2024-04-20 ASSESSMENT — PAIN SCALES - GENERAL: PAINLEVEL_OUTOF10: 1

## 2024-04-20 ASSESSMENT — PAIN DESCRIPTION - ORIENTATION: ORIENTATION: LEFT

## 2024-04-20 ASSESSMENT — PAIN DESCRIPTION - DESCRIPTORS: DESCRIPTORS: SORE

## 2024-04-20 NOTE — PROGRESS NOTES
Report called to Sowmya CONNOLLY at [facility]. Questions answered, verbalized understanding, Patient is scheduled for 11:00  today.

## 2024-04-20 NOTE — PROGRESS NOTES
Physical Therapy Missed Treatment   Facility/Department: [facility] MED SURG W269/W269-01    NAME: Paola Childers    : 1935 (89 y.o.)  MRN: [id number]    Account: [id number]  Gender: female    Chart reviewed, attempted PT at 0839. Patient unavailable 2° to:    [] Hold per nsg request    [x] Pt declined \"I feel too nauseous right now to get up.\" Pt confirms the plan is for dc to SNF at 11 am. Encouragement and alternate tx options provided. Pt continues to decline at this time.     [] Pt.. off floor for test/procedure.     [] Pt. Unavailable       Will attempt PT treatment again at earliest convenience.      Electronically signed by Sheldon Burt PTA on 24 at 9:13 AM EDT

## 2024-04-20 NOTE — DISCHARGE SUMMARY
DISCHARGE SUMMARY   [place] [address] Paola Childers  MRN: [id number]  YOB: 1935  89 y.o.female      Attending  No att. providers found ?   Date of Admission  4/16/2024 Date of Discharge  4/20/2024      ?  DIAGNOSES:  Principal Problem:    Closed left hip fracture, initial encounter ([facility])  Active Problems:    Fall    Acute blood loss anemia    Acute post-operative pain    Acute pain due to trauma    Acute midline thoracic back pain    Acute pain of left shoulder  Resolved Problems:    * No resolved hospital problems. *         PROCEDURES:  4/17/2024: s/p left hip hemiarthroplasty with Orthopedic Surgery (Dr. Arrington)  ?    DISCHARGE MEDICATIONS:  Discharge Medication List as of 4/20/2024 10:17 AM             Details   acetaminophen (TYLENOL) 325 MG tablet Take 2 tablets by mouth every 6 hours as needed for Pain, Disp-112 tablet, R-0DC to SNF      camphor-menthol-methyl salicylate (BENGAY ULTRA STRENGTH) 4-10-30 % CREA cream Apply topically 3 times daily as needed for Pain For neck and shoulder pain, Apply externally, 3 TIMES DAILY PRN Starting Sat 4/20/2024, Disp-56.7 g, R-0, DC to SNF      oxyCODONE (ROXICODONE) 5 MG immediate release tablet Take 1 tablet by mouth every 6 hours as needed for Pain for up to 7 days. Max Daily Amount: 20 mg, Disp-28 tablet, R-0Print      methocarbamol (ROBAXIN) 500 MG tablet Take 1 tablet by mouth 4 times daily as needed (muscle spasms/ thigh pain), Disp-30 tablet, R-0NO PRINT      aspirin 81 MG EC tablet Take 1 tablet by mouth twice daily, Disp-84 tablet, R-0DC to SNF   Called with Mic Otoole and reviewed change to BID with nursing staff ([facility], 4/20)             Details   lisinopril (PRINIVIL;ZESTRIL) 20 MG tablet Take 1 tablet by mouth in the morning and at bedtimeDC to SNF                Details   ondansetron (ZOFRAN-ODT) 4 MG disintegrating tablet Take 1 tablet by mouth 3 times daily as needed for Nausea or Vomiting, Disp-21

## 2024-04-20 NOTE — PROGRESS NOTES
Physical Therapy  Facility/Department: [facility] MED SURG W269/W269-01  Physical Therapy Discharge      NAME: Paola Childers    : 1935 (89 y.o.)  MRN: [id number]    Account: [id number]  Gender: female      Patient has been discharged from acute care hospital. DC patient from current PT program.      Electronically signed by Su Hicks PT on 24 at 3:20 PM EDT

## 2024-04-22 ENCOUNTER — OFFICE VISIT (OUTPATIENT)
Dept: GERIATRIC MEDICINE | Age: 89
End: 2024-04-22
Payer: MEDICARE

## 2024-04-22 DIAGNOSIS — I10 HYPERTENSION, UNSPECIFIED TYPE: ICD-10-CM

## 2024-04-22 DIAGNOSIS — Z47.89 ORTHOPEDIC AFTERCARE: Primary | ICD-10-CM

## 2024-04-22 PROCEDURE — 99306 1ST NF CARE HIGH MDM 50: CPT | Performed by: INTERNAL MEDICINE

## 2024-04-22 PROCEDURE — 1123F ACP DISCUSS/DSCN MKR DOCD: CPT | Performed by: INTERNAL MEDICINE

## 2024-04-22 NOTE — PROGRESS NOTES
History and Physical      CHIEF COMPLAINT:  Left hip fx     History of Present Illness:      A 89 y.o. female who is being seen at [facility]. She had Left hip surgery. She is laying in bed and resting comfortably. She tells me that she needs to use the bed pan.     REVIEW OF SYSTEMS:  A complete 10 Point review of systems was preformed and negative unless previously stated      PMH:  Past Medical History:   Diagnosis Date    Anxiety     Hernia of abdominal cavity     Hypertension        Surgical History:  Past Surgical History:   Procedure Laterality Date    ESOPHAGUS SURGERY      FLIPPED STOMACH    TOTAL HIP ARTHROPLASTY Left 4/17/2024    LEFT HIP GREGORIO ARTHROPLASTY performed by Edd Arrington DO at [facility] OR       Medications Prior to Admission:    Prior to Admission medications    Medication Sig Start Date End Date Taking? Authorizing Provider   acetaminophen (TYLENOL) 325 MG tablet Take 2 tablets by mouth every 6 hours as needed for Pain 4/20/24 5/4/24  Madison Amaro PA   camphor-menthol-methyl salicylate (BENGAY ULTRA STRENGTH) 4-10-30 % CREA cream Apply topically 3 times daily as needed for Pain For neck and shoulder pain 4/20/24   Madison Amaro PA   lisinopril (PRINIVIL;ZESTRIL) 20 MG tablet Take 1 tablet by mouth in the morning and at bedtime 4/20/24   Madison Amaro PA   aspirin 81 MG EC tablet Take 1 tablet by mouth in the morning and at bedtime  Patient taking differently: Take 1 tablet by mouth daily Indications: Treatment to Prevent a Heart Attack X 42d then daily starting 6/3/2024 4/20/24 6/1/24  Madison Amaro PA   ondansetron (ZOFRAN-ODT) 4 MG disintegrating tablet Take 1 tablet by mouth 3 times daily as needed for Nausea or Vomiting 6/25/20   Griffin Nixon MD   amLODIPine (NORVASC) 2.5 MG tablet Take 1 tablet by mouth daily Indications: High Blood Pressure Disorder    Provider, MD Maria G   omeprazole (PRILOSEC) 40 MG delayed release capsule Take 1 capsule by mouth daily

## 2024-04-25 ENCOUNTER — OFFICE VISIT (OUTPATIENT)
Dept: GERIATRIC MEDICINE | Age: 89
End: 2024-04-25

## 2024-04-25 DIAGNOSIS — S72.002A CLOSED LEFT HIP FRACTURE, INITIAL ENCOUNTER (HCC): ICD-10-CM

## 2024-04-25 DIAGNOSIS — R82.90 FOUL SMELLING URINE: ICD-10-CM

## 2024-04-25 DIAGNOSIS — I10 PRIMARY HYPERTENSION: Primary | ICD-10-CM

## 2024-04-25 DIAGNOSIS — R39.9 UTI SYMPTOMS: ICD-10-CM

## 2024-05-02 ENCOUNTER — OFFICE VISIT (OUTPATIENT)
Dept: GERIATRIC MEDICINE | Age: 89
End: 2024-05-02

## 2024-05-02 DIAGNOSIS — R39.9 UTI SYMPTOMS: ICD-10-CM

## 2024-05-02 DIAGNOSIS — S72.002A CLOSED LEFT HIP FRACTURE, INITIAL ENCOUNTER (HCC): ICD-10-CM

## 2024-05-02 DIAGNOSIS — R09.89 CHEST CONGESTION: Primary | ICD-10-CM

## 2024-05-02 DIAGNOSIS — K59.09 OTHER CONSTIPATION: ICD-10-CM

## 2024-05-02 LAB
ANION GAP SERPL CALCULATED.3IONS-SCNC: 11 MEQ/L (ref 9–15)
BASOPHILS # BLD: 0 K/UL (ref 0–0.2)
BASOPHILS NFR BLD: 0.4 %
BUN SERPL-MCNC: 11 MG/DL (ref 8–23)
CALCIUM SERPL-MCNC: 8.9 MG/DL (ref 8.5–9.9)
CHLORIDE SERPL-SCNC: 95 MEQ/L (ref 95–107)
CO2 SERPL-SCNC: 26 MEQ/L (ref 20–31)
CREAT SERPL-MCNC: 0.42 MG/DL (ref 0.5–0.9)
EOSINOPHIL # BLD: 0.2 K/UL (ref 0–0.7)
EOSINOPHIL NFR BLD: 2.1 %
ERYTHROCYTE [DISTWIDTH] IN BLOOD BY AUTOMATED COUNT: 13.2 % (ref 11.5–14.5)
GLUCOSE SERPL-MCNC: 101 MG/DL (ref 70–99)
HCT VFR BLD AUTO: 33.1 % (ref 37–47)
HGB BLD-MCNC: 10.4 G/DL (ref 12–16)
LYMPHOCYTES # BLD: 2.1 K/UL (ref 1–4.8)
LYMPHOCYTES NFR BLD: 21.3 %
MCH RBC QN AUTO: 30.6 PG (ref 27–31.3)
MCHC RBC AUTO-ENTMCNC: 31.4 % (ref 33–37)
MCV RBC AUTO: 97.4 FL (ref 79.4–94.8)
MONOCYTES # BLD: 0.9 K/UL (ref 0.2–0.8)
MONOCYTES NFR BLD: 9 %
NEUTROPHILS # BLD: 6.5 K/UL (ref 1.4–6.5)
NEUTS SEG NFR BLD: 66.7 %
PLATELET # BLD AUTO: 378 K/UL (ref 130–400)
POTASSIUM SERPL-SCNC: 3.8 MEQ/L (ref 3.4–4.9)
RBC # BLD AUTO: 3.4 M/UL (ref 4.2–5.4)
SODIUM SERPL-SCNC: 132 MEQ/L (ref 135–144)
WBC # BLD AUTO: 9.7 K/UL (ref 4.8–10.8)

## 2024-05-03 ENCOUNTER — HOSPITAL ENCOUNTER (OUTPATIENT)
Dept: ORTHOPEDIC SURGERY | Age: 89
End: 2024-05-03
Payer: MEDICARE

## 2024-05-03 ENCOUNTER — OFFICE VISIT (OUTPATIENT)
Dept: ORTHOPEDIC SURGERY | Age: 89
End: 2024-05-03

## 2024-05-03 VITALS — HEIGHT: 66 IN | BODY MASS INDEX: 30.53 KG/M2 | WEIGHT: 190 LBS

## 2024-05-03 DIAGNOSIS — S72.002A CLOSED LEFT HIP FRACTURE, INITIAL ENCOUNTER (HCC): Primary | ICD-10-CM

## 2024-05-03 DIAGNOSIS — S72.002A CLOSED LEFT HIP FRACTURE, INITIAL ENCOUNTER (HCC): ICD-10-CM

## 2024-05-03 PROCEDURE — 73501 X-RAY EXAM HIP UNI 1 VIEW: CPT

## 2024-05-03 PROCEDURE — 73501 X-RAY EXAM HIP UNI 1 VIEW: CPT | Performed by: ORTHOPAEDIC SURGERY

## 2024-05-03 ASSESSMENT — ENCOUNTER SYMPTOMS
ABDOMINAL PAIN: 0
ABDOMINAL DISTENTION: 1
SHORTNESS OF BREATH: 0
COUGH: 0
NAUSEA: 0
CONSTIPATION: 1

## 2024-05-03 NOTE — PROGRESS NOTES
Subjective:      Patient ID: Paola Childers is a 89 y.o. female who presents today for:  Chief Complaint   Patient presents with    Post-Op Check     Patient presents to clinic for 1st post-op following left hip hemiarthroplasty on 4/17/24.       Subjective/Objective/Assessment/Plan:     SUBJECTIVE -patient is here for follow-up after undergoing left hip hemiarthroplasty on 4/17/2024.    OBJECTIVE -incision is well-approximated without signs of infection.  Staples removed today Steri-Strips placed.    XR HIP 1 VW W PELVIS LEFT  3 views left hip.  Implant is in the appropriate position alignment.  No   movement.      ASSESSMENT -    Diagnosis Orders   1. Closed left hip fracture, initial encounter ([facility])  XR HIP 1 VW W PELVIS LEFT          PLAN -I would like to see her back in 4 weeks.  Continue with physical therapy.  Showers only no sitting in water submerging that incision.    --------------------------------------------------------------------------------------------------------------  Past Medical History:   Diagnosis Date    Anxiety     Hernia of abdominal cavity     Hypertension      --------------------------------------------------------------------------------------------------------------  Past Surgical History:   Procedure Laterality Date    ESOPHAGUS SURGERY      FLIPPED STOMACH    TOTAL HIP ARTHROPLASTY Left 4/17/2024    LEFT HIP GREGORIO ARTHROPLASTY performed by Edd Arrington DO at [facility] OR     --------------------------------------------------------------------------------------------------------------    Tobacco Use      Smoking status: Never      Smokeless tobacco: Never     reports no history of drug use.  --------------------------------------------------------------------------------------------------------------  Allergies   Allergen Reactions    Codeine Nausea And Vomiting    Macrobid [Nitrofurantoin Macrocrystal] Nausea And Vomiting     Pt was severely ill after taking     Ciprofloxacin Nausea And

## 2024-05-03 NOTE — PROGRESS NOTES
agitation and decreased concentration. The patient is not nervous/anxious.    All other systems reviewed and are negative.      Objective:   VS: See PCC. Reviewed.    Physical Exam  Vitals (See PCC) reviewed.   Constitutional:       General: She is not in acute distress.     Appearance: Normal appearance. She is normal weight. She is not ill-appearing.   HENT:      Head: Normocephalic and atraumatic.      Mouth/Throat:      Mouth: Mucous membranes are moist.      Pharynx: Oropharynx is clear.   Eyes:      Conjunctiva/sclera: Conjunctivae normal.   Cardiovascular:      Rate and Rhythm: Normal rate and regular rhythm.   Pulmonary:      Effort: Pulmonary effort is normal. No respiratory distress.      Breath sounds: Wheezing present.   Abdominal:      General: Bowel sounds are normal. There is no distension.      Palpations: Abdomen is soft.      Tenderness: There is no abdominal tenderness.      Hernia: A hernia is present.   Skin:     General: Skin is warm and dry.   Neurological:      Mental Status: She is oriented to person, place, and time. Mental status is at baseline.      Motor: Weakness present.   Psychiatric:         Mood and Affect: Mood normal.         Behavior: Behavior normal.         Assessment:       Diagnosis Orders   1. Primary hypertension        2. Foul smelling urine        3. UTI symptoms        4. Closed left hip fracture, initial encounter ([facility])              Plan:      No orders of the defined types were placed in this encounter.    No orders of the defined types were placed in this encounter.      Vital signs every shift x 1 week.  MiraLAX 1 capful daily with breakfast.  BMP, CBC, and UA briefly with reflex tomorrow.Derm consult for 2x lesions.     No follow-ups on file.      ROVERTO Reina    Electronically signed by: ROVERTO Reina on 5/2/2024    Please note orders entered on site at facility after discussion with appropriate facility nursing/therapy/ / nutritional staff. Current

## 2024-05-09 ENCOUNTER — OFFICE VISIT (OUTPATIENT)
Dept: GERIATRIC MEDICINE | Age: 89
End: 2024-05-09

## 2024-05-09 DIAGNOSIS — I10 HYPERTENSION, UNSPECIFIED TYPE: ICD-10-CM

## 2024-05-09 DIAGNOSIS — L98.9 SKIN LESIONS: ICD-10-CM

## 2024-05-09 DIAGNOSIS — R09.89 CHEST CONGESTION: ICD-10-CM

## 2024-05-09 DIAGNOSIS — K59.00 CONSTIPATION, UNSPECIFIED CONSTIPATION TYPE: ICD-10-CM

## 2024-05-09 DIAGNOSIS — S72.002D CLOSED FRACTURE OF LEFT HIP WITH ROUTINE HEALING, SUBSEQUENT ENCOUNTER: Primary | ICD-10-CM

## 2024-05-09 ASSESSMENT — ENCOUNTER SYMPTOMS
WHEEZING: 1
NAUSEA: 0
ABDOMINAL PAIN: 0
COUGH: 1
CONSTIPATION: 1
SHORTNESS OF BREATH: 0
ABDOMINAL DISTENTION: 1

## 2024-05-10 NOTE — PROGRESS NOTES
Subjective:      Patient ID: Paola Childers is a pleasant 89 y.o. female who presents today for:  Chief Complaint   Patient presents with    Other     Chest congestion  (primary encounter diagnosis)  Closed left hip fracture, initial encounter ([facility])  Uti symptoms  Other constipation         [facility]    Patient seen today for new onset cough, monitoring of constipation/BMs, as well as monitoring for left femur fracture pain.  Pain well-controlled on current regimen.  Patient family present today.  Concern chest congestion.  No productive cough, however patient does have bibasilar crackles noted with coughing and deep inhalation.  Denies history of CHF.  UA results earlier this week that showed turbid urine with positive blood, 3+ leukocyte esterase and negative nitrites.  Plan on continuing Cheratussin monitoring labs.  Will also x-ray.  Patient complaining of some constipation and no \"no adequate bowel movement in the past 3 days \", so we will check both CXR and abdominal x-ray.  Will begin empiric Augmentin as well due to multitude of symptoms and family request.    Patient Active Problem List   Diagnosis    Angina at rest ([facility])    Hypertensive urgency    Chronic fatigue    Anxiety    Nausea & vomiting    Closed left hip fracture, initial encounter ([facility])    Fall    Acute blood loss anemia    Acute post-operative pain    Acute pain due to trauma    Acute midline thoracic back pain    Acute pain of left shoulder     Past Medical History:   Diagnosis Date    Anxiety     Hernia of abdominal cavity     Hypertension      Past Surgical History:   Procedure Laterality Date    ESOPHAGUS SURGERY      FLIPPED STOMACH    TOTAL HIP ARTHROPLASTY Left 4/17/2024    LEFT HIP GREGORIO ARTHROPLASTY performed by Edd Arrington DO [place] [facility] [place]     Social History     Socioeconomic History    Marital status: Single     Spouse name: Not on file    Number of children: Not on file    Years of education: Not on file    Highest education level:

## 2024-05-15 ENCOUNTER — TELEPHONE (OUTPATIENT)
Dept: ORTHOPEDIC SURGERY | Age: 89
End: 2024-05-15

## 2024-05-15 NOTE — TELEPHONE ENCOUNTER
Radha Higgins called from home health care to ask what precautions she has, such as bending weight bearing status   Her number is [phone]

## 2024-05-16 ENCOUNTER — OFFICE VISIT (OUTPATIENT)
Dept: PRIMARY CARE | Facility: CLINIC | Age: 89
End: 2024-05-16
Payer: MEDICARE

## 2024-05-16 VITALS
HEART RATE: 78 BPM | DIASTOLIC BLOOD PRESSURE: 66 MMHG | TEMPERATURE: 97.3 F | HEIGHT: 65 IN | SYSTOLIC BLOOD PRESSURE: 100 MMHG | BODY MASS INDEX: 29.49 KG/M2 | WEIGHT: 177 LBS

## 2024-05-16 DIAGNOSIS — D49.2 ABNORMAL SKIN GROWTH: ICD-10-CM

## 2024-05-16 DIAGNOSIS — S72.001S CLOSED FRACTURE OF RIGHT HIP, SEQUELA: ICD-10-CM

## 2024-05-16 DIAGNOSIS — R11.2 NAUSEA AND VOMITING, UNSPECIFIED VOMITING TYPE: ICD-10-CM

## 2024-05-16 DIAGNOSIS — Z00.00 ROUTINE GENERAL MEDICAL EXAMINATION AT HEALTH CARE FACILITY: Primary | ICD-10-CM

## 2024-05-16 PROCEDURE — 1159F MED LIST DOCD IN RCRD: CPT | Performed by: INTERNAL MEDICINE

## 2024-05-16 PROCEDURE — 1170F FXNL STATUS ASSESSED: CPT | Performed by: INTERNAL MEDICINE

## 2024-05-16 PROCEDURE — G0439 PPPS, SUBSEQ VISIT: HCPCS | Performed by: INTERNAL MEDICINE

## 2024-05-16 PROCEDURE — 3078F DIAST BP <80 MM HG: CPT | Performed by: INTERNAL MEDICINE

## 2024-05-16 PROCEDURE — 1160F RVW MEDS BY RX/DR IN RCRD: CPT | Performed by: INTERNAL MEDICINE

## 2024-05-16 PROCEDURE — 3074F SYST BP LT 130 MM HG: CPT | Performed by: INTERNAL MEDICINE

## 2024-05-16 PROCEDURE — 1036F TOBACCO NON-USER: CPT | Performed by: INTERNAL MEDICINE

## 2024-05-16 PROCEDURE — 1123F ACP DISCUSS/DSCN MKR DOCD: CPT | Performed by: INTERNAL MEDICINE

## 2024-05-16 RX ORDER — ONDANSETRON 4 MG/1
4 TABLET, FILM COATED ORAL EVERY 8 HOURS PRN
Qty: 50 TABLET | Refills: 0 | Status: SHIPPED | OUTPATIENT
Start: 2024-05-16

## 2024-05-16 ASSESSMENT — ACTIVITIES OF DAILY LIVING (ADL)
DOING_HOUSEWORK: TOTAL CARE
PREPARING MEALS: TOTAL CARE
WALKS IN HOME: DEPENDENT
JUDGMENT_ADEQUATE_SAFELY_COMPLETE_DAILY_ACTIVITIES: NO
TAKING MEDICATION: TOTAL CARE
PATIENT'S MEMORY ADEQUATE TO SAFELY COMPLETE DAILY ACTIVITIES?: NO
FEEDING: NEEDS ASSISTANCE
MANAGING_FINANCES: TOTAL CARE
HEARING - RIGHT EAR: DIFFICULTY WITH NOISE
FEEDING YOURSELF: NEEDS ASSISTANCE
STIL DRIVING: NO
BATHING: DEPENDENT
ADEQUATE_TO_COMPLETE_ADL: YES
DRESSING: DEPENDENT
BATHING: DEPENDENT
GROCERY SHOPPING: TOTAL CARE
EATING: NEEDS ASSISTANCE
USING TRANSPORTATION: TOTAL CARE
MANAGING FINANCES: TOTAL CARE
TOILETING: NEEDS ASSISTANCE
HEARING - LEFT EAR: DIFFICULTY WITH NOISE
ADEQUATE_TO_COMPLETE_ADL: YES
DRESSING YOURSELF: DEPENDENT
BATHING: DEPENDENT
JUDGMENT_ADEQUATE_SAFELY_COMPLETE_DAILY_ACTIVITIES: NO
USING TELEPHONE: NEEDS ASSISTANCE
DOING HOUSEWORK: TOTAL CARE
GROOMING: DEPENDENT
TAKING_MEDICATION: TOTAL CARE
DRESSING: DEPENDENT
TOILETING: NEEDS ASSISTANCE
GROCERY_SHOPPING: TOTAL CARE

## 2024-05-16 ASSESSMENT — ENCOUNTER SYMPTOMS
WOUND: 0
ARTHRALGIAS: 1
DEPRESSION: 0
OCCASIONAL FEELINGS OF UNSTEADINESS: 1
ACTIVITY CHANGE: 1
COUGH: 0
BACK PAIN: 1
APNEA: 0
LOSS OF SENSATION IN FEET: 0
SHORTNESS OF BREATH: 0
NERVOUS/ANXIOUS: 0
TREMORS: 1
CARDIOVASCULAR NEGATIVE: 1
DIFFICULTY URINATING: 0
WEAKNESS: 1
APPETITE CHANGE: 1
NAUSEA: 0
FATIGUE: 1
ABDOMINAL PAIN: 0

## 2024-05-16 ASSESSMENT — PATIENT HEALTH QUESTIONNAIRE - PHQ9
10. IF YOU CHECKED OFF ANY PROBLEMS, HOW DIFFICULT HAVE THESE PROBLEMS MADE IT FOR YOU TO DO YOUR WORK, TAKE CARE OF THINGS AT HOME, OR GET ALONG WITH OTHER PEOPLE: SOMEWHAT DIFFICULT
10. IF YOU CHECKED OFF ANY PROBLEMS, HOW DIFFICULT HAVE THESE PROBLEMS MADE IT FOR YOU TO DO YOUR WORK, TAKE CARE OF THINGS AT HOME, OR GET ALONG WITH OTHER PEOPLE: SOMEWHAT DIFFICULT
2. FEELING DOWN, DEPRESSED OR HOPELESS: SEVERAL DAYS
SUM OF ALL RESPONSES TO PHQ9 QUESTIONS 1 AND 2: 2
2. FEELING DOWN, DEPRESSED OR HOPELESS: SEVERAL DAYS
1. LITTLE INTEREST OR PLEASURE IN DOING THINGS: SEVERAL DAYS
1. LITTLE INTEREST OR PLEASURE IN DOING THINGS: SEVERAL DAYS
SUM OF ALL RESPONSES TO PHQ9 QUESTIONS 1 AND 2: 2

## 2024-05-16 NOTE — PROGRESS NOTES
Subjective   Reason for Visit: Angela Alba is an 89 y.o. female here for a Medicare Wellness visit.     Past Medical, Surgical, and Family History reviewed and updated in chart.    Reviewed all medications by prescribing practitioner or clinical pharmacist (such as prescriptions, OTCs, herbal therapies and supplements) and documented in the medical record.    89-year-old female patient was seen for follow-up and we perform wellness exam as she is due.  She is getting frail, she is staying with her son, she has osteoporosis, we never treated it, she has a history of femoral fracture and this time she fell and broke her right hip, she underwent hemiarthroplasty, she has a really bad stay in nursing facility it was a very poor experience, patient is home since last Saturday, many times her BP readings were low she remains on amlodipine and full dose of lisinopril, she has a mild rotation of got with the previous gastrointestinal surgery and they did not give patient's a PPI treatment, today she came by wheelchair, patient's daughter-in-law and daughter were present, skin turgor is poor, the lesion from the tongue has been excised, there is a large lesion on the right upper arm which is quite suspicious for skin cancer it is a squamous cell carcinoma until proven otherwise.  Because she has been improving, all the reports and information from hospitalization were reviewed including hemoglobin which was 10.0, echo was done report was reviewed electrolytes were reviewed, today also her BP readings were low, she has a tremors she could have a features of parkinsonism symptoms syndrome.        Patient Care Team:  Barrington Yang MD as PCP - General  Barrington Yang MD as PCP - [employer] PCP     Review of Systems   Constitutional:  Positive for activity change, appetite change and fatigue.   HENT:  Negative for congestion.    Respiratory:  Negative for apnea, cough and shortness of breath.   "  Cardiovascular: Negative.    Gastrointestinal:  Negative for abdominal pain and nausea.   Genitourinary:  Negative for difficulty urinating.   Musculoskeletal:  Positive for arthralgias, back pain and gait problem.   Skin: Negative.  Negative for wound.   Neurological:  Positive for tremors and weakness.   Psychiatric/Behavioral:  Negative for behavioral problems. The patient is not nervous/anxious.        Objective   Vitals:  /66 (BP Location: Right arm, Patient Position: Sitting, BP Cuff Size: Adult)   Pulse 78   Temp 36.3 °C (97.3 °F) (Temporal)   Ht 1.651 m (5' 5\")   Wt 80.3 kg (177 lb)   BMI 29.45 kg/m²       Physical Exam  Constitutional:       Appearance: Normal appearance. She is normal weight. She is not ill-appearing.   HENT:      Head: Normocephalic.   Eyes:      Conjunctiva/sclera: Conjunctivae normal.   Cardiovascular:      Rate and Rhythm: Normal rate and regular rhythm.      Heart sounds: Normal heart sounds.   Pulmonary:      Effort: Pulmonary effort is normal.      Breath sounds: Normal breath sounds.   Abdominal:      Palpations: Abdomen is soft.   Musculoskeletal:         General: Tenderness present. No swelling.      Cervical back: Rigidity and tenderness present.   Skin:     General: Skin is warm and dry.      Comments: Large raised leison rt upper limb area   Neurological:      Mental Status: Mental status is at baseline.   Psychiatric:         Behavior: Behavior normal.       Assessment/Plan   Problem List Items Addressed This Visit       Routine general medical examination at health care facility - Primary     Other Visit Diagnoses       Abnormal skin growth        Relevant Orders    Referral to Plastic Surgery    Closed fracture of right hip, sequela            Patient was seen for follow-up today, patient is doing well, recent emergency room visit was reviewed, patient's concerns were reviewed and addressed, recent laboratories were reviewed, there is no significant change in " his patient's physical condition since last visit, laboratories were ordered as per schedule, diagnosis list were reviewed and updated, screening test are reviewed, follow-up as needed.  Plan of care was reviewed with the daughter and daughter-in-law, discontinue lisinopril at all only stays on amlodipine, she will stay on omeprazole and Zofran was refilled, patient has history of gastrointestinal malrotation which required major surgery and then she has a hiatal hernia and postoperative incisional hernia, abnormal skin growth is suspicious for squamous cell carcinoma it will require plastic surgery or excision, fracture of hip happened and she will not be a candidate for any osteoporosis treatment.  She has excellent support from the family, living will was reviewed, movements are limited, would not pursue any treatment for tremors or even suspected Parkinson's disease.  Periodic assessment and follow-up will be done, plastic surgery referral was given.  They have a hospital bed send all the gadgets at home.  Family will inform me if there is any need or concern arise but patient remains susceptible for fall and more complications, she has a history of melanoma also, this concludes wellness exam related preventive care visit with the hospitalization related data reviewed and explained, follow-up in 6 months.

## 2024-05-16 NOTE — PROGRESS NOTES
Subjective:      Patient ID: Paola Childers is a pleasant 89 y.o. female who presents today for:  No chief complaint on file.      [facility]    REASON FOR VISIT discharge summary report.    SUBJECTIVE: Patient discharging today after recovering from left hip fracture.  Did have modest recovery and strength during her time here.  Nonweightbearing, will perform PT/OT at home.  Did have episodes increased Hinds swelling urine,?  UTI was started on Bactrim and will continue till 5/12/2024.  Does have right arm and leg lesion that she is scheduled for [place] dermat[facility] to consult on.  Blood pressure has been intermittently high.  Did increase amlodipine to 5 mg p.o. daily.  Additionally having concerns of constipation which we added MiraLAX for daily.  During course of her stay patient had some mild chest congestion, did not worsen overall possible concern for aspiration and/or dysphagia.  Somewhat productive cough will encourage Mucinex at home to thin out secretions.  Patient to follow-up with PCP within a week of discharge if possible with Ortho team as needed.      Patient Active Problem List   Diagnosis    Angina at rest ([facility])    Hypertensive urgency    Chronic fatigue    Anxiety    Nausea & vomiting    Closed left hip fracture, initial encounter ([facility])    Fall    Acute blood loss anemia    Acute post-operative pain    Acute pain due to trauma    Acute midline thoracic back pain    Acute pain of left shoulder     Past Medical History:   Diagnosis Date    Anxiety     Hernia of abdominal cavity     Hypertension      Past Surgical History:   Procedure Laterality Date    ESOPHAGUS SURGERY      FLIPPED STOMACH    TOTAL HIP ARTHROPLASTY Left 4/17/2024    LEFT HIP GREGORIO ARTHROPLASTY [place] [facility] [place]     Social History     Socioeconomic History    Marital status: Single     Spouse name: Not on file    Number of children: Not on file    Years of education: Not on file    Highest education level: Not
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
## 2024-05-17 ASSESSMENT — ENCOUNTER SYMPTOMS
ABDOMINAL PAIN: 0
CONSTIPATION: 1
ABDOMINAL DISTENTION: 1
SHORTNESS OF BREATH: 0
COUGH: 1
NAUSEA: 0
WHEEZING: 1

## 2024-06-03 ENCOUNTER — OFFICE VISIT (OUTPATIENT)
Dept: ORTHOPEDIC SURGERY | Age: 89
End: 2024-06-03
Payer: MEDICARE

## 2024-06-03 ENCOUNTER — HOSPITAL ENCOUNTER (OUTPATIENT)
Dept: ORTHOPEDIC SURGERY | Age: 89
Discharge: HOME | End: 2024-06-05
Payer: MEDICARE

## 2024-06-03 VITALS
TEMPERATURE: 97.3 F | WEIGHT: 190 LBS | BODY MASS INDEX: 30.53 KG/M2 | OXYGEN SATURATION: 94 % | HEART RATE: 88 BPM | HEIGHT: 66 IN

## 2024-06-03 DIAGNOSIS — R52 PAIN: ICD-10-CM

## 2024-06-03 DIAGNOSIS — S72.002A CLOSED LEFT HIP FRACTURE, INITIAL ENCOUNTER (HCC): ICD-10-CM

## 2024-06-03 DIAGNOSIS — S72.002A CLOSED LEFT HIP FRACTURE, INITIAL ENCOUNTER (HCC): Primary | ICD-10-CM

## 2024-06-03 PROCEDURE — 99024 POSTOP FOLLOW-UP VISIT: CPT | Performed by: ORTHOPAEDIC SURGERY

## 2024-06-03 PROCEDURE — 73502 X-RAY EXAM HIP UNI 2-3 VIEWS: CPT | Performed by: ORTHOPAEDIC SURGERY

## 2024-06-03 PROCEDURE — 73502 X-RAY EXAM HIP UNI 2-3 VIEWS: CPT

## 2024-06-03 NOTE — PROGRESS NOTES
Subjective:      Patient ID: Paola Childers is a 89 y.o. female who presents today for:  Chief Complaint   Patient presents with    Post-Op Check     Pt presents here for 2nd post-op  Left hip hemiarthroplasty          Subjective/Objective/Assessment/Plan:     SUBJECTIVE -patient is here for follow-up after undergoing left hip hemiarthroplasty on 4/17/2024.    OBJECTIVE -incision is well-approximated without signs of infection.      XR HIP 2-3 VW W PELVIS RIGHT  3 views right hip.  Severe bone-on-bone apposition with osteoarthritic   degenerative joint changes about the right hip joint.  XR HIP 2-3 VW W PELVIS LEFT  3 views left hip.  Left hip hemiarthroplasty in appropriate position   alignment.      ASSESSMENT -    Diagnosis Orders   1. Closed left hip fracture, initial encounter (HCC)  XR HIP 2-3 VW W PELVIS LEFT          PLAN -I would like to see her back in 6 weeks.  Continue with physical therapy.      --------------------------------------------------------------------------------------------------------------  Past Medical History:   Diagnosis Date    Anxiety     Hernia of abdominal cavity     Hypertension      --------------------------------------------------------------------------------------------------------------  Past Surgical History:   Procedure Laterality Date    ESOPHAGUS SURGERY      FLIPPED STOMACH    TOTAL HIP ARTHROPLASTY Left 4/17/2024    LEFT HIP GREGORIO ARTHROPLASTY performed by Edd Arrington DO [place] [facility] [place]     --------------------------------------------------------------------------------------------------------------    Tobacco Use      Smoking status: Never      Smokeless tobacco: Never     reports no history of drug use.  --------------------------------------------------------------------------------------------------------------  Allergies   Allergen Reactions    Codeine Nausea And Vomiting, Other (See Comments) and Nausea Only     Derivatives    Nitrofurantoin Macrocrystal Nausea And

## 2024-07-11 PROBLEM — M54.6 ACUTE MIDLINE THORACIC BACK PAIN: Status: ACTIVE | Noted: 2024-04-20

## 2024-07-11 PROBLEM — R11.2 NAUSEA & VOMITING: Status: ACTIVE | Noted: 2019-05-11

## 2024-07-11 PROBLEM — I16.0 HYPERTENSIVE URGENCY: Status: ACTIVE | Noted: 2018-10-12

## 2024-07-11 PROBLEM — F41.9 ANXIETY: Status: ACTIVE | Noted: 2018-10-14

## 2024-07-11 PROBLEM — D62 ACUTE BLOOD LOSS ANEMIA: Status: ACTIVE | Noted: 2024-04-20

## 2024-07-11 PROBLEM — G89.11 ACUTE PAIN DUE TO TRAUMA: Status: ACTIVE | Noted: 2024-04-20

## 2024-07-11 PROBLEM — S72.002A CLOSED LEFT HIP FRACTURE, INITIAL ENCOUNTER (MULTI): Status: ACTIVE | Noted: 2024-04-17

## 2024-07-11 PROBLEM — M25.512 ACUTE PAIN OF LEFT SHOULDER: Status: ACTIVE | Noted: 2024-04-20

## 2024-07-11 PROBLEM — R53.82 CHRONIC FATIGUE: Status: ACTIVE | Noted: 2018-10-12

## 2024-07-11 PROBLEM — I20.89 ANGINA AT REST (CMS-HCC): Status: ACTIVE | Noted: 2018-10-12

## 2024-07-11 PROBLEM — E78.5 DYSLIPIDEMIA: Status: ACTIVE | Noted: 2024-07-11

## 2024-07-11 RX ORDER — ACETAMINOPHEN 325 MG/1
2 TABLET ORAL EVERY 6 HOURS PRN
COMMUNITY
Start: 2024-04-20

## 2024-07-11 RX ORDER — ASPIRIN 81 MG/1
81 TABLET ORAL 2 TIMES DAILY
COMMUNITY
Start: 2024-04-20

## 2024-07-11 RX ORDER — KETOTIFEN FUMARATE 0.025 %
DROPS OPHTHALMIC (EYE)
COMMUNITY
Start: 2024-04-20

## 2024-07-11 RX ORDER — POLYETHYLENE GLYCOL 3350 17 G/17G
17 POWDER, FOR SOLUTION ORAL
COMMUNITY
Start: 2020-06-30

## 2024-07-15 ENCOUNTER — HOSPITAL ENCOUNTER (OUTPATIENT)
Dept: ORTHOPEDIC SURGERY | Age: 89
Discharge: HOME | End: 2024-07-17
Payer: MEDICARE

## 2024-07-15 ENCOUNTER — OFFICE VISIT (OUTPATIENT)
Dept: ORTHOPEDIC SURGERY | Age: 89
End: 2024-07-15
Payer: MEDICARE

## 2024-07-15 VITALS — WEIGHT: 190 LBS | BODY MASS INDEX: 30.53 KG/M2 | HEIGHT: 66 IN

## 2024-07-15 DIAGNOSIS — S72.002A CLOSED LEFT HIP FRACTURE, INITIAL ENCOUNTER (HCC): ICD-10-CM

## 2024-07-15 DIAGNOSIS — S72.002A CLOSED LEFT HIP FRACTURE, INITIAL ENCOUNTER (HCC): Primary | ICD-10-CM

## 2024-07-15 PROCEDURE — 73502 X-RAY EXAM HIP UNI 2-3 VIEWS: CPT | Performed by: ORTHOPAEDIC SURGERY

## 2024-07-15 PROCEDURE — 99024 POSTOP FOLLOW-UP VISIT: CPT | Performed by: ORTHOPAEDIC SURGERY

## 2024-07-15 PROCEDURE — 73502 X-RAY EXAM HIP UNI 2-3 VIEWS: CPT

## 2024-07-15 NOTE — PROGRESS NOTES
Other (See Comments) and Nausea Only     Derivatives    Nitrofurantoin Macrocrystal Nausea And Vomiting and Other (See Comments)     Pt was severely ill after taking    Ciprofloxacin Nausea And Vomiting and Other (See Comments)    Hydromorphone Other (See Comments)    Rosuvastatin Other (See Comments)    Nitrofurantoin Nausea And Vomiting     --------------------------------------------------------------------------------------------------------------    Current Outpatient Medications:     camphor-menthol-methyl salicylate (BENGAY ULTRA STRENGTH) 4-10-30 % CREA cream, Apply topically 3 times daily as needed for Pain For neck and shoulder pain, Disp: 56.7 g, Rfl: 0    lisinopril (PRINIVIL;ZESTRIL) 20 MG tablet, Take 1 tablet by mouth in the morning and at bedtime, Disp: , Rfl:     ondansetron (ZOFRAN-ODT) 4 MG disintegrating tablet, Take 1 tablet by mouth 3 times daily as needed for Nausea or Vomiting, Disp: 21 tablet, Rfl: 0    amLODIPine (NORVASC) 2.5 MG tablet, Take 1 tablet by mouth daily Indications: High Blood Pressure Disorder, Disp: , Rfl:     omeprazole (PRILOSEC) 40 MG delayed release capsule, Take 1 capsule by mouth daily Indications: Gastroesophageal Reflux Disease, Disp: , Rfl:     acetaminophen (TYLENOL) 325 MG tablet, Take 2 tablets by mouth every 6 hours as needed for Pain, Disp: 112 tablet, Rfl: 0    aspirin 81 MG EC tablet, Take 1 tablet by mouth in the morning and at bedtime (Patient taking differently: Take 1 tablet by mouth daily Indications: Treatment to Prevent a Heart Attack X 42d then daily starting 6/3/2024), Disp: 84 tablet, Rfl: 0  --------------------------------------------------------------------------------------------------------------    Edd Arrington DO  Orthopedic and Spine Surgeon  [facility]  [phone]

## 2024-07-17 ENCOUNTER — PREP FOR PROCEDURE (OUTPATIENT)
Dept: PREADMISSION TESTING | Facility: HOSPITAL | Age: 89
End: 2024-07-17

## 2024-07-17 ENCOUNTER — APPOINTMENT (OUTPATIENT)
Dept: PLASTIC SURGERY | Facility: CLINIC | Age: 89
End: 2024-07-17
Payer: MEDICARE

## 2024-07-17 ENCOUNTER — LAB (OUTPATIENT)
Dept: LAB | Facility: HOSPITAL | Age: 89
End: 2024-07-17
Payer: MEDICARE

## 2024-07-17 VITALS
BODY MASS INDEX: 30.53 KG/M2 | HEIGHT: 66 IN | DIASTOLIC BLOOD PRESSURE: 60 MMHG | SYSTOLIC BLOOD PRESSURE: 102 MMHG | WEIGHT: 190 LBS

## 2024-07-17 DIAGNOSIS — Z01.818 PRE-OPERATIVE CLEARANCE: ICD-10-CM

## 2024-07-17 DIAGNOSIS — L98.9 SKIN LESION OF RIGHT LEG: ICD-10-CM

## 2024-07-17 DIAGNOSIS — D49.2 ABNORMAL SKIN GROWTH: ICD-10-CM

## 2024-07-17 DIAGNOSIS — C44.702 CANCER OF SKIN OF RIGHT LOWER EXTREMITY: ICD-10-CM

## 2024-07-17 DIAGNOSIS — C44.602 SKIN CANCER OF ARM, RIGHT: Primary | ICD-10-CM

## 2024-07-17 DIAGNOSIS — D49.9 NEOPLASM OF UNSPECIFIED BEHAVIOR OF UNSPECIFIED SITE: ICD-10-CM

## 2024-07-17 DIAGNOSIS — C44.602 SKIN CANCER OF ARM, RIGHT: ICD-10-CM

## 2024-07-17 DIAGNOSIS — D49.2 NEOPLASM OF UNSPECIFIED BEHAVIOR OF BONE, SOFT TISSUE, AND SKIN: ICD-10-CM

## 2024-07-17 LAB
ALBUMIN SERPL BCP-MCNC: 3.8 G/DL (ref 3.4–5)
ALP SERPL-CCNC: 120 U/L (ref 33–136)
ALT SERPL W P-5'-P-CCNC: 4 U/L (ref 7–45)
ANION GAP SERPL CALC-SCNC: 10 MMOL/L (ref 10–20)
APTT PPP: 36 SECONDS (ref 27–38)
AST SERPL W P-5'-P-CCNC: 12 U/L (ref 9–39)
BILIRUB SERPL-MCNC: 0.3 MG/DL (ref 0–1.2)
BUN SERPL-MCNC: 19 MG/DL (ref 6–23)
CALCIUM SERPL-MCNC: 9.6 MG/DL (ref 8.6–10.3)
CHLORIDE SERPL-SCNC: 106 MMOL/L (ref 98–107)
CO2 SERPL-SCNC: 28 MMOL/L (ref 21–32)
CREAT SERPL-MCNC: 0.55 MG/DL (ref 0.5–1.05)
EGFRCR SERPLBLD CKD-EPI 2021: 89 ML/MIN/1.73M*2
ERYTHROCYTE [DISTWIDTH] IN BLOOD BY AUTOMATED COUNT: 12.9 % (ref 11.5–14.5)
GLUCOSE SERPL-MCNC: 95 MG/DL (ref 74–99)
HCT VFR BLD AUTO: 39.1 % (ref 36–46)
HGB BLD-MCNC: 12.6 G/DL (ref 12–16)
INR PPP: 1 (ref 0.9–1.1)
MCH RBC QN AUTO: 29.6 PG (ref 26–34)
MCHC RBC AUTO-ENTMCNC: 32.2 G/DL (ref 32–36)
MCV RBC AUTO: 92 FL (ref 80–100)
NRBC BLD-RTO: 0 /100 WBCS (ref 0–0)
PLATELET # BLD AUTO: 191 X10*3/UL (ref 150–450)
POTASSIUM SERPL-SCNC: 3.8 MMOL/L (ref 3.5–5.3)
PROT SERPL-MCNC: 6.7 G/DL (ref 6.4–8.2)
PROTHROMBIN TIME: 11.7 SECONDS (ref 9.8–12.8)
RBC # BLD AUTO: 4.25 X10*6/UL (ref 4–5.2)
SODIUM SERPL-SCNC: 140 MMOL/L (ref 136–145)
WBC # BLD AUTO: 6.3 X10*3/UL (ref 4.4–11.3)

## 2024-07-17 PROCEDURE — 84075 ASSAY ALKALINE PHOSPHATASE: CPT

## 2024-07-17 PROCEDURE — 3074F SYST BP LT 130 MM HG: CPT | Performed by: PLASTIC SURGERY

## 2024-07-17 PROCEDURE — 85610 PROTHROMBIN TIME: CPT

## 2024-07-17 PROCEDURE — 1036F TOBACCO NON-USER: CPT | Performed by: PLASTIC SURGERY

## 2024-07-17 PROCEDURE — 1123F ACP DISCUSS/DSCN MKR DOCD: CPT | Performed by: PLASTIC SURGERY

## 2024-07-17 PROCEDURE — 36415 COLL VENOUS BLD VENIPUNCTURE: CPT

## 2024-07-17 PROCEDURE — 99204 OFFICE O/P NEW MOD 45 MIN: CPT | Performed by: PLASTIC SURGERY

## 2024-07-17 PROCEDURE — 1159F MED LIST DOCD IN RCRD: CPT | Performed by: PLASTIC SURGERY

## 2024-07-17 PROCEDURE — 1126F AMNT PAIN NOTED NONE PRSNT: CPT | Performed by: PLASTIC SURGERY

## 2024-07-17 PROCEDURE — 85027 COMPLETE CBC AUTOMATED: CPT

## 2024-07-17 PROCEDURE — 3078F DIAST BP <80 MM HG: CPT | Performed by: PLASTIC SURGERY

## 2024-07-17 RX ORDER — CEFAZOLIN SODIUM 2 G/50ML
2 SOLUTION INTRAVENOUS ONCE
OUTPATIENT
Start: 2024-07-30

## 2024-07-17 RX ORDER — SODIUM CHLORIDE, SODIUM LACTATE, POTASSIUM CHLORIDE, CALCIUM CHLORIDE 600; 310; 30; 20 MG/100ML; MG/100ML; MG/100ML; MG/100ML
100 INJECTION, SOLUTION INTRAVENOUS CONTINUOUS
OUTPATIENT
Start: 2024-07-30

## 2024-07-17 ASSESSMENT — PAIN SCALES - GENERAL: PAINLEVEL: 0-NO PAIN

## 2024-07-17 NOTE — PROGRESS NOTES
"Reason for Visit: H&P    Assessment and Plan:  Problem List Items Addressed This Visit    None  Visit Diagnoses       Abnormal skin growth              Mass right arm    HPI:    89-year-old female with an enlarging skin cancer on the right arm lesion on the right thigh and lesion on the right leg.  Patient is to undergo excision under general anesthesia  ROS otherwise negative aside from what was mentioned above in HPI.    Vitals  /60 (BP Location: Left arm, Patient Position: Sitting) Comment: very faint  Ht 1.676 m (5' 6\") Comment: per pt  Wt 86.2 kg (190 lb) Comment: per pt  BMI 30.67 kg/m²   Body mass index is 30.67 kg/m².  Physical Exam  Gen: Alert, NAD  HEENT:  Normal exam  Neck:  No masses/nodes palpable; Thyroid nontender and without nodules; No MAGNUS  Respiratory:  Lungs CTAB  CV: RRR  Neuro:  Gross motor and sensory intact  Skin:  No suspicious lesions present  Breast: No masses or axillary lymphadenopathy  Extremities patient uses a wheelchair  Right arm with a 3 x 4 cm enlarging lesion  Right upper thigh with a lesion on the groin crease region measuring 1 cm and right leg with a 3 x 1.5 to 2 cm macular lesion compatible with a skin cancer.    Active Problem List  Patient Active Problem List   Diagnosis    Anxiety    Hypertension    Hiatal hernia    Hip arthritis    Hypercholesterolemia    Post-menopausal osteoporosis    Postoperative incisional hernia    Routine general medical examination at health care facility    Tongue lesion    Acute blood loss anemia    Acute midline thoracic back pain    Acute pain of left shoulder    Acute pain due to trauma    Acute post-operative pain    Angina at rest (CMS-HCC)    Atelectasis    Chronic fatigue    Closed left hip fracture, initial encounter ([facility])    Dyslipidemia    Hypertensive urgency    Migraine without aura    Nausea & vomiting       Comprehensive Medical/Surgical/Social/Family History  Past Medical History:   Diagnosis Date    Personal history of " diseases of the blood and blood-forming organs and certain disorders involving the immune mechanism     History of anemia     No past surgical history on file.  Social History     Social History Narrative    Not on file       Allergies and Medications  Codeine, Nitrofurantoin macrocrystal, Ciprofloxacin, Hydromorphone, Rosuvastatin, and Nitrofurantoin  Current Outpatient Medications   Medication Instructions    acetaminophen (Tylenol) 325 mg tablet 2 tablets, oral, Every 6 hours PRN    amLODIPine (NORVASC) 2.5 mg, oral, Daily    aspirin 81 mg, oral, 2 times daily    camphor-methyl salicyl-menthoL (Muscle Rub Ultra-Strength) 4-30-10 % cream Topical    LORazepam (ATIVAN) 0.5 mg, oral, Daily PRN    omeprazole (PRILOSEC) 40 mg, oral, Daily, Do not crush or chew.     ondansetron (ZOFRAN) 4 mg, oral, Every 8 hours PRN    polyethylene glycol (MIRALAX) 17 g, oral   Impression: Large skin cancer right arm, skin cancer right thigh and right leg  Recommendation: Excision under general anesthesia.    We have reviewed the consent form, paragraph by paragraph regarding the possible complications.  Patient appears to understand and wishes to proceed and has provided both written and  verbal consent in agreement.

## 2024-07-25 RX ORDER — CETIRIZINE HYDROCHLORIDE 10 MG/1
10 TABLET ORAL DAILY
COMMUNITY

## 2024-07-25 NOTE — PREPROCEDURE INSTRUCTIONS
Pre-op instructions reviewed with patient's daughter in law, including NPO status, where to check in and having  available after.

## 2024-07-30 ENCOUNTER — ANESTHESIA (OUTPATIENT)
Dept: OPERATING ROOM | Facility: HOSPITAL | Age: 89
End: 2024-07-30
Payer: MEDICARE

## 2024-07-30 ENCOUNTER — ANESTHESIA EVENT (OUTPATIENT)
Dept: OPERATING ROOM | Facility: HOSPITAL | Age: 89
End: 2024-07-30
Payer: MEDICARE

## 2024-07-30 ENCOUNTER — HOSPITAL ENCOUNTER (OUTPATIENT)
Facility: HOSPITAL | Age: 89
Setting detail: OUTPATIENT SURGERY
Discharge: HOME | End: 2024-07-30
Attending: PLASTIC SURGERY | Admitting: PLASTIC SURGERY
Payer: MEDICARE

## 2024-07-30 ENCOUNTER — PHARMACY VISIT (OUTPATIENT)
Dept: PHARMACY | Facility: CLINIC | Age: 89
End: 2024-07-30
Payer: MEDICARE

## 2024-07-30 VITALS
OXYGEN SATURATION: 95 % | HEIGHT: 67 IN | WEIGHT: 161.6 LBS | DIASTOLIC BLOOD PRESSURE: 84 MMHG | TEMPERATURE: 97.3 F | HEART RATE: 77 BPM | RESPIRATION RATE: 16 BRPM | SYSTOLIC BLOOD PRESSURE: 174 MMHG | BODY MASS INDEX: 25.36 KG/M2

## 2024-07-30 DIAGNOSIS — D49.2 NEOPLASM OF UNSPECIFIED BEHAVIOR OF BONE, SOFT TISSUE, AND SKIN: ICD-10-CM

## 2024-07-30 DIAGNOSIS — C44.702 CANCER OF SKIN OF RIGHT LOWER EXTREMITY: ICD-10-CM

## 2024-07-30 DIAGNOSIS — L98.9 SKIN LESION OF RIGHT LEG: ICD-10-CM

## 2024-07-30 DIAGNOSIS — C44.602 SKIN CANCER OF ARM, RIGHT: Primary | ICD-10-CM

## 2024-07-30 PROCEDURE — RXMED WILLOW AMBULATORY MEDICATION CHARGE

## 2024-07-30 PROCEDURE — 2720000007 HC OR 272 NO HCPCS: Performed by: PLASTIC SURGERY

## 2024-07-30 PROCEDURE — 3700000002 HC GENERAL ANESTHESIA TIME - EACH INCREMENTAL 1 MINUTE: Performed by: PLASTIC SURGERY

## 2024-07-30 PROCEDURE — 3700000001 HC GENERAL ANESTHESIA TIME - INITIAL BASE CHARGE: Performed by: PLASTIC SURGERY

## 2024-07-30 PROCEDURE — 11604 EXC TR-EXT MAL+MARG 3.1-4 CM: CPT | Performed by: PLASTIC SURGERY

## 2024-07-30 PROCEDURE — 2500000004 HC RX 250 GENERAL PHARMACY W/ HCPCS (ALT 636 FOR OP/ED): Performed by: PLASTIC SURGERY

## 2024-07-30 PROCEDURE — 7100000002 HC RECOVERY ROOM TIME - EACH INCREMENTAL 1 MINUTE: Performed by: PLASTIC SURGERY

## 2024-07-30 PROCEDURE — 11402 EXC TR-EXT B9+MARG 1.1-2 CM: CPT | Performed by: PLASTIC SURGERY

## 2024-07-30 PROCEDURE — 3600000003 HC OR TIME - INITIAL BASE CHARGE - PROCEDURE LEVEL THREE: Performed by: PLASTIC SURGERY

## 2024-07-30 PROCEDURE — 2500000005 HC RX 250 GENERAL PHARMACY W/O HCPCS: Performed by: PLASTIC SURGERY

## 2024-07-30 PROCEDURE — 7100000001 HC RECOVERY ROOM TIME - INITIAL BASE CHARGE: Performed by: PLASTIC SURGERY

## 2024-07-30 PROCEDURE — 7100000010 HC PHASE TWO TIME - EACH INCREMENTAL 1 MINUTE: Performed by: PLASTIC SURGERY

## 2024-07-30 PROCEDURE — 7100000009 HC PHASE TWO TIME - INITIAL BASE CHARGE: Performed by: PLASTIC SURGERY

## 2024-07-30 PROCEDURE — 2500000004 HC RX 250 GENERAL PHARMACY W/ HCPCS (ALT 636 FOR OP/ED): Mod: JZ | Performed by: PLASTIC SURGERY

## 2024-07-30 PROCEDURE — 2500000005 HC RX 250 GENERAL PHARMACY W/O HCPCS: Performed by: NURSE ANESTHETIST, CERTIFIED REGISTERED

## 2024-07-30 PROCEDURE — 11606 EXC TR-EXT MAL+MARG >4 CM: CPT | Performed by: PLASTIC SURGERY

## 2024-07-30 PROCEDURE — 2500000004 HC RX 250 GENERAL PHARMACY W/ HCPCS (ALT 636 FOR OP/ED): Performed by: ANESTHESIOLOGY

## 2024-07-30 PROCEDURE — 2500000004 HC RX 250 GENERAL PHARMACY W/ HCPCS (ALT 636 FOR OP/ED): Performed by: NURSE ANESTHETIST, CERTIFIED REGISTERED

## 2024-07-30 PROCEDURE — 3600000008 HC OR TIME - EACH INCREMENTAL 1 MINUTE - PROCEDURE LEVEL THREE: Performed by: PLASTIC SURGERY

## 2024-07-30 PROCEDURE — 12037 INTMD RPR S/TR/EXT >30.0 CM: CPT | Performed by: PLASTIC SURGERY

## 2024-07-30 PROCEDURE — A6213 FOAM DRG >16<=48 SQ IN W/BDR: HCPCS | Performed by: PLASTIC SURGERY

## 2024-07-30 RX ORDER — PROPOFOL 10 MG/ML
INJECTION, EMULSION INTRAVENOUS AS NEEDED
Status: DISCONTINUED | OUTPATIENT
Start: 2024-07-30 | End: 2024-07-30

## 2024-07-30 RX ORDER — SODIUM CHLORIDE, SODIUM LACTATE, POTASSIUM CHLORIDE, CALCIUM CHLORIDE 600; 310; 30; 20 MG/100ML; MG/100ML; MG/100ML; MG/100ML
100 INJECTION, SOLUTION INTRAVENOUS CONTINUOUS
Status: DISCONTINUED | OUTPATIENT
Start: 2024-07-30 | End: 2024-07-30 | Stop reason: HOSPADM

## 2024-07-30 RX ORDER — MEPERIDINE HYDROCHLORIDE 25 MG/ML
12.5 INJECTION INTRAMUSCULAR; INTRAVENOUS; SUBCUTANEOUS EVERY 10 MIN PRN
Status: DISCONTINUED | OUTPATIENT
Start: 2024-07-30 | End: 2024-07-30 | Stop reason: HOSPADM

## 2024-07-30 RX ORDER — DIPHENHYDRAMINE HYDROCHLORIDE 50 MG/ML
12.5 INJECTION INTRAMUSCULAR; INTRAVENOUS ONCE AS NEEDED
Status: DISCONTINUED | OUTPATIENT
Start: 2024-07-30 | End: 2024-07-30 | Stop reason: HOSPADM

## 2024-07-30 RX ORDER — TRAMADOL HYDROCHLORIDE 50 MG/1
50 TABLET ORAL EVERY 12 HOURS PRN
Qty: 10 TABLET | Refills: 0 | Status: SHIPPED | OUTPATIENT
Start: 2024-07-30 | End: 2024-08-04

## 2024-07-30 RX ORDER — BUPIVACAINE HYDROCHLORIDE 2.5 MG/ML
INJECTION, SOLUTION EPIDURAL; INFILTRATION; INTRACAUDAL AS NEEDED
Status: DISCONTINUED | OUTPATIENT
Start: 2024-07-30 | End: 2024-07-30 | Stop reason: HOSPADM

## 2024-07-30 RX ORDER — LIDOCAINE HYDROCHLORIDE AND EPINEPHRINE 20; 10 MG/ML; UG/ML
INJECTION, SOLUTION INFILTRATION; PERINEURAL AS NEEDED
Status: DISCONTINUED | OUTPATIENT
Start: 2024-07-30 | End: 2024-07-30 | Stop reason: HOSPADM

## 2024-07-30 RX ORDER — ONDANSETRON HYDROCHLORIDE 2 MG/ML
INJECTION, SOLUTION INTRAVENOUS AS NEEDED
Status: DISCONTINUED | OUTPATIENT
Start: 2024-07-30 | End: 2024-07-30

## 2024-07-30 RX ORDER — ETOMIDATE 2 MG/ML
INJECTION INTRAVENOUS AS NEEDED
Status: DISCONTINUED | OUTPATIENT
Start: 2024-07-30 | End: 2024-07-30

## 2024-07-30 RX ORDER — LIDOCAINE HYDROCHLORIDE 10 MG/ML
0.1 INJECTION, SOLUTION EPIDURAL; INFILTRATION; INTRACAUDAL; PERINEURAL ONCE
Status: DISCONTINUED | OUTPATIENT
Start: 2024-07-30 | End: 2024-07-30 | Stop reason: HOSPADM

## 2024-07-30 RX ORDER — METOPROLOL TARTRATE 1 MG/ML
5 INJECTION, SOLUTION INTRAVENOUS ONCE
Status: DISCONTINUED | OUTPATIENT
Start: 2024-07-30 | End: 2024-07-30 | Stop reason: HOSPADM

## 2024-07-30 RX ORDER — FENTANYL CITRATE 50 UG/ML
25 INJECTION, SOLUTION INTRAMUSCULAR; INTRAVENOUS EVERY 5 MIN PRN
Status: DISCONTINUED | OUTPATIENT
Start: 2024-07-30 | End: 2024-07-30 | Stop reason: HOSPADM

## 2024-07-30 RX ORDER — FENTANYL CITRATE 50 UG/ML
INJECTION, SOLUTION INTRAMUSCULAR; INTRAVENOUS AS NEEDED
Status: DISCONTINUED | OUTPATIENT
Start: 2024-07-30 | End: 2024-07-30

## 2024-07-30 RX ORDER — ACETAMINOPHEN 325 MG/1
650 TABLET ORAL EVERY 4 HOURS PRN
Status: CANCELLED | OUTPATIENT
Start: 2024-07-30

## 2024-07-30 RX ORDER — ONDANSETRON HYDROCHLORIDE 2 MG/ML
4 INJECTION, SOLUTION INTRAVENOUS ONCE AS NEEDED
Status: COMPLETED | OUTPATIENT
Start: 2024-07-30 | End: 2024-07-30

## 2024-07-30 RX ORDER — OXYCODONE HYDROCHLORIDE 5 MG/1
5 TABLET ORAL EVERY 4 HOURS PRN
Status: CANCELLED | OUTPATIENT
Start: 2024-07-30

## 2024-07-30 RX ORDER — SODIUM CHLORIDE 0.9 G/100ML
IRRIGANT IRRIGATION AS NEEDED
Status: DISCONTINUED | OUTPATIENT
Start: 2024-07-30 | End: 2024-07-30 | Stop reason: HOSPADM

## 2024-07-30 RX ORDER — CEFAZOLIN SODIUM 2 G/100ML
2 INJECTION, SOLUTION INTRAVENOUS ONCE
Status: COMPLETED | OUTPATIENT
Start: 2024-07-30 | End: 2024-07-30

## 2024-07-30 RX ORDER — LIDOCAINE HYDROCHLORIDE 20 MG/ML
INJECTION, SOLUTION INFILTRATION; PERINEURAL AS NEEDED
Status: DISCONTINUED | OUTPATIENT
Start: 2024-07-30 | End: 2024-07-30

## 2024-07-30 RX ORDER — ALBUTEROL SULFATE 0.83 MG/ML
2.5 SOLUTION RESPIRATORY (INHALATION) ONCE AS NEEDED
Status: DISCONTINUED | OUTPATIENT
Start: 2024-07-30 | End: 2024-07-30 | Stop reason: HOSPADM

## 2024-07-30 SDOH — HEALTH STABILITY: MENTAL HEALTH: CURRENT SMOKER: 0

## 2024-07-30 ASSESSMENT — PAIN - FUNCTIONAL ASSESSMENT
PAIN_FUNCTIONAL_ASSESSMENT: 0-10
PAIN_FUNCTIONAL_ASSESSMENT: WONG-BAKER FACES
PAIN_FUNCTIONAL_ASSESSMENT: 0-10
PAIN_FUNCTIONAL_ASSESSMENT: WONG-BAKER FACES
PAIN_FUNCTIONAL_ASSESSMENT: 0-10
PAIN_FUNCTIONAL_ASSESSMENT: WONG-BAKER FACES
PAIN_FUNCTIONAL_ASSESSMENT: WONG-BAKER FACES
PAIN_FUNCTIONAL_ASSESSMENT: 0-10

## 2024-07-30 ASSESSMENT — PAIN SCALES - GENERAL
PAINLEVEL_OUTOF10: 8
PAINLEVEL_OUTOF10: 0 - NO PAIN
PAINLEVEL_OUTOF10: 0 - NO PAIN
PAINLEVEL_OUTOF10: 6
PAINLEVEL_OUTOF10: 0 - NO PAIN
PAIN_LEVEL: 1
PAINLEVEL_OUTOF10: 8
PAINLEVEL_OUTOF10: 0 - NO PAIN
PAINLEVEL_OUTOF10: 4
PAINLEVEL_OUTOF10: 0 - NO PAIN
PAINLEVEL_OUTOF10: 0 - NO PAIN
PAINLEVEL_OUTOF10: 7
PAINLEVEL_OUTOF10: 0 - NO PAIN
PAINLEVEL_OUTOF10: 6
PAINLEVEL_OUTOF10: 0 - NO PAIN

## 2024-07-30 ASSESSMENT — COLUMBIA-SUICIDE SEVERITY RATING SCALE - C-SSRS
2. HAVE YOU ACTUALLY HAD ANY THOUGHTS OF KILLING YOURSELF?: NO
1. IN THE PAST MONTH, HAVE YOU WISHED YOU WERE DEAD OR WISHED YOU COULD GO TO SLEEP AND NOT WAKE UP?: NO
6. HAVE YOU EVER DONE ANYTHING, STARTED TO DO ANYTHING, OR PREPARED TO DO ANYTHING TO END YOUR LIFE?: NO

## 2024-07-30 ASSESSMENT — PAIN DESCRIPTION - ORIENTATION: ORIENTATION: RIGHT

## 2024-07-30 ASSESSMENT — PAIN DESCRIPTION - LOCATION
LOCATION: ARM
LOCATION: ARM

## 2024-07-30 NOTE — BRIEF OP NOTE
Date: 2024  OR Location: [facility]    Name: Angela Alba, : 1935, Age: 89 y.o., MRN: [id number], Sex: female    Diagnosis  Pre-op Diagnosis      * Skin cancer of arm, right [C44.602]     * Cancer of skin of right lower extremity [C44.702]     * Skin lesion of right leg [L98.9]     * Neoplasm of unspecified behavior of bone, soft tissue, and skin [D49.2] Post-op Diagnosis     * Skin cancer of arm, right [C44.602]     * Cancer of skin of right lower extremity [C44.702]     * Skin lesion of right leg [L98.9]     * Neoplasm of unspecified behavior of bone, soft tissue, and skin [D49.2]     Procedures  Excision Lesion Skin Right Upper Arm  62243 - NE EXCISION MALIGNANT LESION TRUNK/ARM/LEG > 4.0 CM    Layered Closure Right Upper Arm Incision  20999 - NE REPAIR INTERMEDIATE S/A/T/E 7.6-12.5 CM    Excision Lesion Skin Right Thigh  68082 - NE EXC B9 LESION MRGN XCP SK TG T/A/L 1.1-2.0 CM    Layered Closure Right Thigh  93777 - NE REPAIR INTERMEDIATE S/A/T/E 2.5 CM/<    Excision Lesion Skin Right Lower Leg  94508 - NE EXCISION MAL LESION TRUNK/ARM/LEG 3.1-4.0 CM    Layered Closure Right Lower Leg  71716 - NE REPAIR INTERMEDIATE S/A/T/E 2.6-7.5 CM      Surgeons      * Roland J Reyes - Primary    Resident/Fellow/Other Assistant:  Surgeons and Role:  * No surgeons found with a matching role *    Procedure Summary  Anesthesia: General  ASA: III  Anesthesia Staff: Anesthesiologist: Eduardo Damian MD  CRNA: AUDRA Friend-CRNA  Estimated Blood Loss: Minimal mL  Intra-op Medications:   Administrations occurring from 0945 to 1330 on 24:   Medication Name Total Dose   sodium chloride 0.9 % irrigation solution 1,000 mL   bupivacaine PF (Marcaine) 0.25 % (2.5 mg/mL) injection 8 mL   lidocaine-epinephrine (Xylocaine W/EPI) 2 %-1:100,000 injection 10 mL   lactated Ringer's infusion Cannot be calculated   ceFAZolin (Ancef) 2 g in dextrose (iso)  mL 2 g              Anesthesia Record                Intraprocedure I/O Totals          Intake    lactated Ringer's infusion 800.00 mL    ceFAZolin (Ancef) 2 g in dextrose (iso)  mL 100.00 mL    Total Intake 900 mL          Specimen:   ID Type Source Tests Collected by Time   1 : skin lesion right lower leg Tissue SOFT TISSUE MASS RESECTION SURGICAL PATHOLOGY EXAM Roland J Reyes, MD 7/30/2024 1050   2 : skin lesionright thigh Tissue SOFT TISSUE MASS RESECTION SURGICAL PATHOLOGY EXAM Roland J Reyes, MD 7/30/2024 1053   3 : SKIN CANCER RIGHT UPPER ARM Tissue SOFT TISSUE MASS RESECTION SURGICAL PATHOLOGY EXAM Roland J Reyes, MD 7/30/2024 1056        Staff:   Circulator: Arnol Dugan Person: Arielle Baez Circulator: Elvia          Findings: Skin cancer right arm  Lesion right thigh, lesion right leg    Complications:  None; patient tolerated the procedure well.     Disposition: PACU - hemodynamically stable.  Condition: stable  Specimens Collected:   ID Type Source Tests Collected by Time   1 : skin lesion right lower leg Tissue SOFT TISSUE MASS RESECTION SURGICAL PATHOLOGY EXAM Roland J Reyes, MD 7/30/2024 1050   2 : skin lesionright thigh Tissue SOFT TISSUE MASS RESECTION SURGICAL PATHOLOGY EXAM Roland J Reyes, MD 7/30/2024 1053   3 : SKIN CANCER RIGHT UPPER ARM Tissue SOFT TISSUE MASS RESECTION SURGICAL PATHOLOGY EXAM Roland J Reyes, MD 7/30/2024 1056     Attending Attestation:     Roland J Reyes  Phone Number: [phone]

## 2024-07-30 NOTE — OP NOTE
Operative note      Preoperative diagnosis:   Skin cancer right arm; lesion right thigh; lesion right leg    Postoperative diagnosis: Same    Procedure: Wide excision skin cancer right arm 8 x 5 cm; excision lesion right thigh 1 x 2.5 cm; excision lesion right leg 4 x 7 cm    Surgeon:              Roland Reyes MD    First Asst.: Johanny Sun    Anesthesia: General    Operative indications: 89-year-old female with a enlarging skin cancer right lateral arm measuring 5 x 4 cm as well has a lesion on the right thigh measuring 1 x 1 cm and lesion on the right leg that is macular measuring 3 x 5 cm.  Patient is now to undergo excision of all 3 lesions under general anesthesia          Operative procedure:    Patient was taken to the operating suite and placed in supine position.  After successful induction of laryngeal mask airway anesthesia the right arm right thigh as well as the right leg were then prepped and draped in the appropriate sterile fashion.  Prior to prepping each site each site was locally infiltrated with 2% lidocaine 1 100,000 epinephrine.  Procedure was begun by first verifying the operative site verbally with operating staff.  The right thigh lesion measuring 1 x 1 cm was then excised in a lenticular shaped fashion down to subcutaneous tissue.  This measures approximately 2.5 cm in length.  Hemostasis was obtained using electrocautery.  Closure was then done using interrupted 3-0 Vicryl subdermal sutures followed by interrupted 3-0 nylon sutures in simple interrupted fashion to reapproximate the skin.  Next, the right leg lesion was then excised in a lenticular shaped fashion utilizing a 7 x 4 cm lenticular shaped incision.  The incision was deepened to subcutaneous tissue until the entire specimen was removed in total and sent to pathology.  Hemostasis was then obtained using electrocautery.  Wound was then irrigated with normal saline.  Undermining of the skin edges was then done.  Closure was  then done using interrupted 2-0 Prolene sutures in simple interrupted fashion followed by 3-0 nylon sutures to reapproximate between the sutures.  Lastly, the 5 x 4 cm large skin cancer of the right lateral arm was then addressed by first excising the tumorous growth at the level of the skin.  The skin margin itself was then excised in an 8 x 5 cm lenticular shaped fashion down to subcutaneous tissue and fascial level.  Hemostasis was then obtained using electrocautery.  The specimen with the tumorous growth and skin margins were then painted to identify the margins.  The wound was then irrigated with normal saline.  Closure was then done using interrupted 3-0 Vicryl subdermal sutures followed by interrupted 2-0 Prolene sutures to reapproximate the skin and 3-0 nylon sutures between each 2-0 Prolene suture.  Mepilex dressings were then applied to all 3 excisional sites followed by an Ace wrap.  Patient tolerated the procedure well.  Patient was then reversed from general anesthesia and sent to postanesthesia care in stable condition.  All instrument sponge counts were correct.                        Estimated blood loss: Minimal

## 2024-07-30 NOTE — ANESTHESIA PREPROCEDURE EVALUATION
Patient: Angela Alba    Procedure Information       Date/Time: 07/30/24 0945    Procedures:       Excision Lesion Skin Right Upper Arm (Right) - Paint Kit      Layered Closure Right Upper Arm Incision (Right)      Excision Lesion Skin Right Thigh (Right)      Layered Closure Right Thigh (Right)      Excision Lesion Skin Right Lower Leg (Right)      Layered Closure Right Lower Leg (Right)    Location: [facility] / [facility]    Surgeons: Roland J Reyes, MD            Relevant Problems   Cardiac   (+) Acute midline thoracic back pain   (+) Angina at rest (CMS-HCC)   (+) Hypercholesterolemia   (+) Hypertension   (+) Hypertensive urgency      Neuro   (+) Anxiety      GI   (+) Hiatal hernia      Hematology   (+) Acute blood loss anemia      Skin   (+) Cancer of skin of right lower extremity   (+) Skin cancer of arm, right       Clinical information reviewed:   Tobacco  Allergies  Meds  Problems  Med Hx  Surg Hx   Fam Hx  Soc   Hx        NPO Detail:  NPO/Void Status  Carbohydrate Drink Given Prior to Surgery? : N  Date of Last Liquid: 07/29/24  Time of Last Liquid: 1900  Date of Last Solid: 07/29/24  Time of Last Solid: 1930  Last Intake Type: Clear fluids  Time of Last Void: 0800         Physical Exam    Airway  Mallampati: II  TM distance: >3 FB  Neck ROM: full     Cardiovascular - normal exam     Dental    Pulmonary - normal exam     Abdominal - normal exam             Anesthesia Plan    History of general anesthesia?: yes  History of complications of general anesthesia?: no    ASA 3     general     The patient is not a current smoker.  Patient did not smoke on day of procedure.    intravenous induction   Anesthetic plan and risks discussed with patient.    Plan discussed with CRNA and attending.

## 2024-07-30 NOTE — DISCHARGE INSTRUCTIONS
Please keep waterproof dressing in place  Okay to shower over waterproof dressing  No heavy lifting or strenuous activity  If dressing comes off please cover with Band-Aid dressing  Prescription for tramadol provided  Follow-up with me in 1 week, I will take dressing off then in the office    General Anesthesia Discharge Instructions    What care is needed at home?  If your throat is sore, suck on ice chips or popsicles to ease throat pain.  Put 2 to 3 pillows under your head and back when you lie down to help you breathe easier.  For the first 24 to 48 hours:  Do not operate heavy or dangerous machinery.  Do not make major decisions or sign important papers. You may not be able to think clearly.  Avoid beer, wine, or mixed drinks.  You are at a higher risk of falling for at least 24 hours after general anesthesia.  Take extra care when you get up.  Do not change positions quickly.  Do not rush when you need to go to the bathroom or to answer the phone.  Ask for help if you feel unsteady when you try to walk.  Wear shoes with non-slip soles and low heels.  What follow-up care is needed?  Your doctor may ask you to come back to the office to check on your progress. Be sure to keep these visits.  If you have stitches that do not dissolve or staples, you will need to have them removed. Your doctor will want to do this in 1 to 2 weeks. If the doctor used skin glue, the glue will fall off on its own.  What drugs may be needed?  The doctor may order drugs to:  Help with pain  Will physical activity be limited?  You will not be allowed to drive right away after the procedure. Ask a family member or a friend to drive you home.  Avoid trying to get out of bed without help until you are sure of your balance.  You may have to limit your activity. Talk to your doctor about if you need to limit how much you lift or limit exercise after your procedure.  What changes to diet are needed?  Start with a light diet when you are fully  awake. This includes things that are easy to swallow like soups, pudding, jello, toast, and eggs. Slowly progress to your normal diet.  What problems could happen?  Low blood pressure  Breathing problems  Upset stomach or throwing up  Dizziness  Blood clots  Infection  When do I need to call the doctor?  Trouble breathing  Upset stomach or throwing up more than 3 times in the next 2 days

## 2024-07-30 NOTE — ANESTHESIA POSTPROCEDURE EVALUATION
Patient: Angela Alba    Procedure Summary       Date: 07/30/24 Room [facility]: [facility]    Anesthesia Start: 1018 Anesthesia Stop: 1159    Procedures:       Excision Lesion Skin Right Upper Arm (Right)      Layered Closure Right Upper Arm Incision (Right)      Excision Lesion Skin Right Thigh (Right)      Layered Closure Right Thigh (Right)      Excision Lesion Skin Right Lower Leg (Right)      Layered Closure Right Lower Leg (Right) Diagnosis:       Skin cancer of arm, right      Cancer of skin of right lower extremity      Skin lesion of right leg      Neoplasm of unspecified behavior of bone, soft tissue, and skin      (Skin cancer of arm, right [C44.602])      (Cancer of skin of right lower extremity [C44.702])      (Skin lesion of right leg [L98.9])      (Neoplasm of unspecified behavior of bone, soft tissue, and skin [D49.2])    Surgeons: Roland J Reyes, MD Responsible Provider: Eduardo Damian MD    Anesthesia Type: general ASA Status: 3            Anesthesia Type: general    Vitals Value Taken Time   /82 07/30/24 1156   Temp 36.4 07/30/24 1200   Pulse 70 07/30/24 1158   Resp 10 07/30/24 1158   SpO2 100 % 07/30/24 1158   Vitals shown include unfiled device data.    Anesthesia Post Evaluation    Patient location during evaluation: bedside  Patient participation: complete - patient participated  Level of consciousness: awake  Pain score: 1  Pain management: adequate  Airway patency: patent  Cardiovascular status: acceptable  Respiratory status: acceptable  Hydration status: acceptable  Postoperative Nausea and Vomiting: none        There were no known notable events for this encounter.

## 2024-07-30 NOTE — ANESTHESIA PROCEDURE NOTES
Airway  Date/Time: 7/30/2024 10:28 AM  Urgency: elective    Airway not difficult    Staffing  Performed: CRNA   Authorized by: Eduardo Damian MD    Performed by: AUDRA Friend-CRNA  Patient location during procedure: OR    Indications and Patient Condition  Indications for airway management: anesthesia  Spontaneous ventilation: present  Sedation level: deep  Preoxygenated: yes  Patient position: sniffing  MILS maintained throughout  Mask difficulty assessment: 0 - not attempted  Planned trial extubation    Final Airway Details  Final airway type: supraglottic airway      Successful airway: classic  Size 4     Number of attempts at approach: 1  Ventilation between attempts: none  Number of other approaches attempted: 0

## 2024-07-31 ENCOUNTER — APPOINTMENT (OUTPATIENT)
Dept: PLASTIC SURGERY | Facility: CLINIC | Age: 89
End: 2024-07-31
Payer: MEDICARE

## 2024-07-31 ENCOUNTER — TELEPHONE (OUTPATIENT)
Dept: PLASTIC SURGERY | Facility: CLINIC | Age: 89
End: 2024-07-31

## 2024-07-31 NOTE — TELEPHONE ENCOUNTER
I received a call from the daughter asking if they are to remove the dressing and if they are to keep the sling on.   I spoke with Dr. Reyes and per his verbal order; you may remove the ACE wraps to sponge bathe around the incisions but do not remove the dressings then place the ACE wraps back on the incisions when done sponge bathing. She is to keep the arm in a sling to not use the arm at this time.  I informed the family and they stated understanding.  Lara Peñaloza [facility]

## 2024-08-05 PROBLEM — M25.569 KNEE PAIN: Status: ACTIVE | Noted: 2024-08-05

## 2024-08-05 PROBLEM — Z86.2 HISTORY OF ANEMIA: Status: ACTIVE | Noted: 2024-08-05

## 2024-08-05 PROBLEM — M84.369A STRESS FRACTURE OF TIBIA: Status: ACTIVE | Noted: 2024-08-05

## 2024-08-05 PROBLEM — S83.249A TEAR OF MEDIAL MENISCUS OF KNEE: Status: ACTIVE | Noted: 2024-08-05

## 2024-08-05 PROBLEM — M23.90 DERANGEMENT OF KNEE: Status: ACTIVE | Noted: 2024-08-05

## 2024-08-05 PROBLEM — C44.91 BASAL CELL CARCINOMA (BCC): Status: ACTIVE | Noted: 2024-08-05

## 2024-08-05 PROBLEM — U07.1 DISEASE DUE TO SEVERE ACUTE RESPIRATORY SYNDROME CORONAVIRUS 2 (SARS-COV-2): Status: ACTIVE | Noted: 2024-08-05

## 2024-08-07 ENCOUNTER — APPOINTMENT (OUTPATIENT)
Dept: PLASTIC SURGERY | Facility: CLINIC | Age: 89
End: 2024-08-07
Payer: MEDICARE

## 2024-08-09 ENCOUNTER — OFFICE VISIT (OUTPATIENT)
Dept: SURGERY | Facility: CLINIC | Age: 89
End: 2024-08-09
Payer: MEDICARE

## 2024-08-09 VITALS — WEIGHT: 161 LBS | BODY MASS INDEX: 25.27 KG/M2 | HEIGHT: 67 IN

## 2024-08-09 DIAGNOSIS — C44.702 CANCER OF SKIN OF RIGHT LOWER EXTREMITY: ICD-10-CM

## 2024-08-09 DIAGNOSIS — C44.602 SKIN CANCER OF ARM, RIGHT: Primary | ICD-10-CM

## 2024-08-09 PROCEDURE — 1036F TOBACCO NON-USER: CPT | Performed by: PLASTIC SURGERY

## 2024-08-09 PROCEDURE — 99024 POSTOP FOLLOW-UP VISIT: CPT | Performed by: PLASTIC SURGERY

## 2024-08-09 PROCEDURE — 1123F ACP DISCUSS/DSCN MKR DOCD: CPT | Performed by: PLASTIC SURGERY

## 2024-08-09 ASSESSMENT — ENCOUNTER SYMPTOMS
FEVER: 0
CHILLS: 0

## 2024-08-09 NOTE — PROGRESS NOTES
Subjective   Patient ID: Angela Alba is a 89 y.o. female.    HPI  Patient is status post excision skin cancer right arm right thigh and right leg.  Review of Systems   Constitutional:  Negative for chills and fever.       Objective   Physical Exam  Dressings are nicely intact on the right arm right thigh and right leg.  There is minimal drainage.  Dressings were left in place.  Assessment/Plan   There are no diagnoses linked to this encounter.    Status post excision skin cancer right arm right thigh and right leg  Follow-up 1 week for alternate suture removal

## 2024-08-15 LAB
LABORATORY COMMENT REPORT: NORMAL
PATH REPORT.FINAL DX SPEC: NORMAL
PATH REPORT.GROSS SPEC: NORMAL
PATH REPORT.RELEVANT HX SPEC: NORMAL
PATH REPORT.TOTAL CANCER: NORMAL

## 2024-08-16 ENCOUNTER — APPOINTMENT (OUTPATIENT)
Dept: PLASTIC SURGERY | Facility: CLINIC | Age: 89
End: 2024-08-16
Payer: MEDICARE

## 2024-08-16 DIAGNOSIS — C44.722 SQUAMOUS CELL CARCINOMA, LEG, RIGHT: ICD-10-CM

## 2024-08-16 DIAGNOSIS — C44.612 BASAL CELL CARCINOMA (BCC) OF SKIN OF RIGHT UPPER EXTREMITY INCLUDING SHOULDER: Primary | ICD-10-CM

## 2024-08-16 PROCEDURE — 1159F MED LIST DOCD IN RCRD: CPT | Performed by: PLASTIC SURGERY

## 2024-08-16 PROCEDURE — 99024 POSTOP FOLLOW-UP VISIT: CPT | Performed by: PLASTIC SURGERY

## 2024-08-16 PROCEDURE — 1123F ACP DISCUSS/DSCN MKR DOCD: CPT | Performed by: PLASTIC SURGERY

## 2024-08-16 PROCEDURE — 1036F TOBACCO NON-USER: CPT | Performed by: PLASTIC SURGERY

## 2024-08-16 PROCEDURE — 1126F AMNT PAIN NOTED NONE PRSNT: CPT | Performed by: PLASTIC SURGERY

## 2024-08-16 RX ORDER — GENTAMICIN SULFATE 1 MG/G
CREAM TOPICAL DAILY
Qty: 30 G | Refills: 2 | Status: SHIPPED | OUTPATIENT
Start: 2024-08-16 | End: 2024-08-30

## 2024-08-16 ASSESSMENT — ENCOUNTER SYMPTOMS
CHILLS: 0
FEVER: 0

## 2024-08-16 ASSESSMENT — PAIN SCALES - GENERAL: PAINLEVEL: 0-NO PAIN

## 2024-08-16 NOTE — PROGRESS NOTES
Subjective   Patient ID: Angela Alba is a 89 y.o. female.    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  All margins were clear.  Pathology report provided to the patient  Review of Systems   Constitutional:  Negative for chills and fever.       Objective   Physical Exam  Examination reveals the incisions are nicely approximated except for the right arm which has some drainage.  Alternate sutures were removed on the right arm and right leg and all sutures were removed on the right thigh.  Assessment/Plan   There are no diagnoses linked to this encounter.    Start gentamicin cream dressing changes daily on the right arm and dry dressing on the right leg.  Follow-up 2 weeks  Prescription provided for gentamicin cream to apply daily

## 2024-08-26 DIAGNOSIS — R11.2 NAUSEA AND VOMITING, UNSPECIFIED VOMITING TYPE: ICD-10-CM

## 2024-08-26 RX ORDER — ONDANSETRON 4 MG/1
TABLET, FILM COATED ORAL
Qty: 50 TABLET | Refills: 0 | Status: SHIPPED | OUTPATIENT
Start: 2024-08-26

## 2024-08-28 ENCOUNTER — APPOINTMENT (OUTPATIENT)
Dept: PLASTIC SURGERY | Facility: CLINIC | Age: 89
End: 2024-08-28
Payer: MEDICARE

## 2024-08-28 VITALS — BODY MASS INDEX: 25.27 KG/M2 | HEIGHT: 67 IN | WEIGHT: 161 LBS

## 2024-08-28 DIAGNOSIS — C44.722 SQUAMOUS CELL CARCINOMA, LEG, RIGHT: ICD-10-CM

## 2024-08-28 DIAGNOSIS — C44.612 BASAL CELL CARCINOMA (BCC) OF SKIN OF RIGHT UPPER EXTREMITY INCLUDING SHOULDER: Primary | ICD-10-CM

## 2024-08-28 PROCEDURE — 1159F MED LIST DOCD IN RCRD: CPT | Performed by: PLASTIC SURGERY

## 2024-08-28 PROCEDURE — 1126F AMNT PAIN NOTED NONE PRSNT: CPT | Performed by: PLASTIC SURGERY

## 2024-08-28 PROCEDURE — 1036F TOBACCO NON-USER: CPT | Performed by: PLASTIC SURGERY

## 2024-08-28 PROCEDURE — 99024 POSTOP FOLLOW-UP VISIT: CPT | Performed by: PLASTIC SURGERY

## 2024-08-28 PROCEDURE — 1123F ACP DISCUSS/DSCN MKR DOCD: CPT | Performed by: PLASTIC SURGERY

## 2024-08-28 PROCEDURE — 1160F RVW MEDS BY RX/DR IN RCRD: CPT | Performed by: PLASTIC SURGERY

## 2024-08-28 ASSESSMENT — ENCOUNTER SYMPTOMS
FEVER: 0
CHILLS: 0

## 2024-08-28 ASSESSMENT — PAIN SCALES - GENERAL: PAINLEVEL: 0-NO PAIN

## 2024-08-28 NOTE — PROGRESS NOTES
Subjective   Patient ID: Angela Alba is a 89 y.o. female.    HPI  Patient is being followed status post excision basal cell carcinoma right arm and squamous cell carcinoma of the right leg as well as right thigh.  Review of Systems   Constitutional:  Negative for chills and fever.       Objective   Physical Exam  Examination reveals the incisions are nicely approximated.  All sutures removed.  Right arm will be dressed with gentamicin cream for 1 more week now as well as right leg.  Right thigh incision is well-approximated with good healing  Assessment/Plan   There are no diagnoses linked to this encounter.    Status post excision basal cell cancer right arm  Status post excision squamous cell carcinoma right thigh and right leg margins are free  Follow-up 1 1 for regular skin cancer follow-up

## 2024-09-25 ENCOUNTER — APPOINTMENT (OUTPATIENT)
Dept: PLASTIC SURGERY | Facility: CLINIC | Age: 89
End: 2024-09-25
Payer: MEDICARE

## 2024-10-15 ENCOUNTER — OFFICE VISIT (OUTPATIENT)
Dept: ORTHOPEDIC SURGERY | Age: 89
End: 2024-10-15
Payer: MEDICARE

## 2024-10-15 ENCOUNTER — HOSPITAL ENCOUNTER (OUTPATIENT)
Dept: ORTHOPEDIC SURGERY | Age: 89
Discharge: HOME | End: 2024-10-17
Payer: MEDICARE

## 2024-10-15 DIAGNOSIS — S72.002A CLOSED LEFT HIP FRACTURE, INITIAL ENCOUNTER (HCC): ICD-10-CM

## 2024-10-15 DIAGNOSIS — S72.002A CLOSED LEFT HIP FRACTURE, INITIAL ENCOUNTER (HCC): Primary | ICD-10-CM

## 2024-10-15 PROCEDURE — 73501 X-RAY EXAM HIP UNI 1 VIEW: CPT

## 2024-10-15 PROCEDURE — 99213 OFFICE O/P EST LOW 20 MIN: CPT | Performed by: ORTHOPAEDIC SURGERY

## 2024-10-15 PROCEDURE — 73501 X-RAY EXAM HIP UNI 1 VIEW: CPT | Performed by: ORTHOPAEDIC SURGERY

## 2024-10-15 PROCEDURE — 1123F ACP DISCUSS/DSCN MKR DOCD: CPT | Performed by: ORTHOPAEDIC SURGERY

## 2024-10-15 NOTE — PROGRESS NOTES
Subjective:      Patient ID: Paola Childers is a 89 y.o. female who presents today for:  Chief Complaint   Patient presents with    Follow-up     Patient presents to clinic for follow up left hip fracture. Patient states left hip has no pain. She states she is having a lot of pain in the right hip.       Subjective/Objective/Assessment/Plan:     SUBJECTIVE -patient is here for follow-up after undergoing left hip hemiarthroplasty on 4/17/2024.    OBJECTIVE -incision is well-approximated without signs of infection.      XR HIP 2-3 VW W PELVIS LEFT  2 views left hip.  Hemiarthroplasty implant in the appropriate position   alignment.      ASSESSMENT -    Diagnosis Orders   1. Closed left hip fracture, initial encounter ([facility])  XR HIP 1 VW W PELVIS LEFT          PLAN -she has no pain in the left hip.  The majority of her pain is coming from her arthritic right hip.  I would like to see her back in 6 months.  No concerns at this time.  She can use a wheeled walker for ambulation purposes.  I offered her a cortisone injection into the right hip to give her some relief.  She does not want to do any type of surgery.  She will determine whether or not she wants to proceed with a right hip intra-articular cortisone injection.  I can see her back in 6 months.    --------------------------------------------------------------------------------------------------------------  Past Medical History:   Diagnosis Date    Anxiety     Hernia of abdominal cavity     Hypertension      --------------------------------------------------------------------------------------------------------------  Past Surgical History:   Procedure Laterality Date    ESOPHAGUS SURGERY      FLIPPED STOMACH    TOTAL HIP ARTHROPLASTY Left 4/17/2024    LEFT HIP GREGORIO ARTHROPLASTY performed by Edd Arrington DO at [facility] [place]     --------------------------------------------------------------------------------------------------------------    Tobacco Use      Smoking

## 2024-10-18 DIAGNOSIS — K21.9 GASTROESOPHAGEAL REFLUX DISEASE WITHOUT ESOPHAGITIS: ICD-10-CM

## 2024-10-18 DIAGNOSIS — I10 ESSENTIAL HYPERTENSION: ICD-10-CM

## 2024-10-18 RX ORDER — AMLODIPINE BESYLATE 2.5 MG/1
2.5 TABLET ORAL DAILY
Qty: 30 TABLET | Refills: 0 | Status: SHIPPED | OUTPATIENT
Start: 2024-10-18

## 2024-10-18 RX ORDER — OMEPRAZOLE 40 MG/1
40 CAPSULE, DELAYED RELEASE ORAL DAILY
Qty: 30 CAPSULE | Refills: 0 | Status: SHIPPED | OUTPATIENT
Start: 2024-10-18

## 2024-11-19 ENCOUNTER — APPOINTMENT (OUTPATIENT)
Dept: PRIMARY CARE | Facility: CLINIC | Age: 89
End: 2024-11-19
Payer: MEDICARE

## 2024-11-19 VITALS
HEART RATE: 69 BPM | DIASTOLIC BLOOD PRESSURE: 80 MMHG | WEIGHT: 163 LBS | TEMPERATURE: 96.2 F | BODY MASS INDEX: 25.58 KG/M2 | HEIGHT: 67 IN | SYSTOLIC BLOOD PRESSURE: 120 MMHG

## 2024-11-19 DIAGNOSIS — K21.9 GASTROESOPHAGEAL REFLUX DISEASE WITHOUT ESOPHAGITIS: ICD-10-CM

## 2024-11-19 DIAGNOSIS — K44.9 HIATAL HERNIA: ICD-10-CM

## 2024-11-19 DIAGNOSIS — I10 PRIMARY HYPERTENSION: Primary | ICD-10-CM

## 2024-11-19 PROBLEM — U07.1 DISEASE DUE TO SEVERE ACUTE RESPIRATORY SYNDROME CORONAVIRUS 2 (SARS-COV-2): Status: RESOLVED | Noted: 2024-08-05 | Resolved: 2024-11-19

## 2024-11-19 PROCEDURE — 3074F SYST BP LT 130 MM HG: CPT | Performed by: INTERNAL MEDICINE

## 2024-11-19 PROCEDURE — 1123F ACP DISCUSS/DSCN MKR DOCD: CPT | Performed by: INTERNAL MEDICINE

## 2024-11-19 PROCEDURE — 1159F MED LIST DOCD IN RCRD: CPT | Performed by: INTERNAL MEDICINE

## 2024-11-19 PROCEDURE — G2211 COMPLEX E/M VISIT ADD ON: HCPCS | Performed by: INTERNAL MEDICINE

## 2024-11-19 PROCEDURE — 1160F RVW MEDS BY RX/DR IN RCRD: CPT | Performed by: INTERNAL MEDICINE

## 2024-11-19 PROCEDURE — 3079F DIAST BP 80-89 MM HG: CPT | Performed by: INTERNAL MEDICINE

## 2024-11-19 PROCEDURE — 1036F TOBACCO NON-USER: CPT | Performed by: INTERNAL MEDICINE

## 2024-11-19 PROCEDURE — 99213 OFFICE O/P EST LOW 20 MIN: CPT | Performed by: INTERNAL MEDICINE

## 2024-11-19 RX ORDER — OMEPRAZOLE 40 MG/1
40 CAPSULE, DELAYED RELEASE ORAL DAILY
Qty: 90 CAPSULE | Refills: 3 | Status: SHIPPED | OUTPATIENT
Start: 2024-11-19

## 2024-11-19 RX ORDER — AMLODIPINE BESYLATE 2.5 MG/1
2.5 TABLET ORAL DAILY
Qty: 90 TABLET | Refills: 3 | Status: SHIPPED | OUTPATIENT
Start: 2024-11-19

## 2024-11-19 ASSESSMENT — ENCOUNTER SYMPTOMS
BLURRED VISION: 0
WOUND: 0
WHEEZING: 0
NEUROLOGICAL NEGATIVE: 1
GASTROINTESTINAL NEGATIVE: 1
BELCHING: 0
CARDIOVASCULAR NEGATIVE: 1
HYPERTENSION: 1
EARLY SATIETY: 0
ARTHRALGIAS: 1
ABDOMINAL PAIN: 0
COUGH: 0
LOSS OF SENSATION IN FEET: 0
NAUSEA: 0
DEPRESSION: 0
FEVER: 0
FATIGUE: 0
OCCASIONAL FEELINGS OF UNSTEADINESS: 1
CONSTITUTIONAL NEGATIVE: 1
BRUISES/BLEEDS EASILY: 0

## 2024-11-19 ASSESSMENT — COLUMBIA-SUICIDE SEVERITY RATING SCALE - C-SSRS
1. IN THE PAST MONTH, HAVE YOU WISHED YOU WERE DEAD OR WISHED YOU COULD GO TO SLEEP AND NOT WAKE UP?: NO
6. HAVE YOU EVER DONE ANYTHING, STARTED TO DO ANYTHING, OR PREPARED TO DO ANYTHING TO END YOUR LIFE?: NO
2. HAVE YOU ACTUALLY HAD ANY THOUGHTS OF KILLING YOURSELF?: NO

## 2024-11-19 ASSESSMENT — PATIENT HEALTH QUESTIONNAIRE - PHQ9
1. LITTLE INTEREST OR PLEASURE IN DOING THINGS: NOT AT ALL
2. FEELING DOWN, DEPRESSED OR HOPELESS: NOT AT ALL
SUM OF ALL RESPONSES TO PHQ9 QUESTIONS 1 AND 2: 0

## 2024-11-19 NOTE — PROGRESS NOTES
Subjective   Patient ID: Angela Alba is a 89 y.o. female who presents for Follow-up (6 mo fu).  Hypertension  This is a chronic problem. The current episode started more than 1 year ago. The problem has been resolved since onset. The problem is controlled. Pertinent negatives include no anxiety, blurred vision or chest pain. Risk factors for coronary artery disease include post-menopausal state and sedentary lifestyle. Past treatments include calcium channel blockers. The current treatment provides significant improvement. There are no compliance problems.  There is no history of angina or kidney disease.   GERD  She reports no abdominal pain, no belching, no chest pain, no coughing, no early satiety, no nausea or no wheezing. This is a recurrent problem. The problem occurs occasionally. The problem has been resolved. Pertinent negatives include no fatigue. She has tried a PPI for the symptoms. The treatment provided moderate relief. Past procedures do not include an EGD.       Past Medical History  Past Medical History:   Diagnosis Date    Hiatal hernia     Uses roller walker        Social History  Social History     Tobacco Use    Smoking status: Never    Smokeless tobacco: Never   Vaping Use    Vaping status: Never Used   Substance Use Topics    Alcohol use: Never    Drug use: Never       Family History     Family History   Problem Relation Name Age of Onset    Other (Abdominal Aoritc Aneurysm) Mother      Other (Postmenopausal Osteoporosis) Mother      Other (Histoplasmosis) Other FH        Allergies:  Allergies   Allergen Reactions    Codeine Unknown, Nausea And Vomiting, Nausea Only, Other and Nausea/vomiting     Derivatives    Nitrofurantoin Macrocrystal Unknown, Nausea And Vomiting and Other     Pt was severely ill after taking    Ciprofloxacin Nausea And Vomiting, Other, Unknown and Nausea/vomiting    Hydromorphone Other    Rosuvastatin Unknown and Other    Nitrofurantoin Nausea And Vomiting         Outpatient Medications:  Current Outpatient Medications   Medication Instructions    amLODIPine (NORVASC) 2.5 mg, oral, Daily    camphor-methyl salicyl-menthoL (Muscle Rub Ultra-Strength) 4-30-10 % cream Topical    cetirizine (ZYRTEC) 10 mg, oral, Daily    docusate sodium (STOOL SOFTENER ORAL) oral    LORazepam (ATIVAN) 0.5 mg, oral, Daily PRN    omeprazole (PRILOSEC) 40 mg, oral, Daily, Do not crush or chew.    ondansetron (Zofran) 4 mg tablet TAKE ONE (1) TABLET BY MOUTH EVERY 8 HOURS IF NEEDED FOR NAUSEA OR VOMITING    polyethylene glycol (MIRALAX) 17 g, oral        Review of Systems   Constitutional: Negative.  Negative for fatigue and fever.   Eyes:  Negative for blurred vision.   Respiratory:  Negative for cough and wheezing.    Cardiovascular: Negative.  Negative for chest pain.   Gastrointestinal: Negative.  Negative for abdominal pain and nausea.   Musculoskeletal:  Positive for arthralgias.   Skin:  Negative for wound.   Neurological: Negative.    Hematological:  Does not bruise/bleed easily.         Objective       Physical Exam  Vitals reviewed.   Constitutional:       Appearance: Normal appearance. She is normal weight.   HENT:      Head: Normocephalic and atraumatic.      Right Ear: There is impacted cerumen.   Eyes:      Conjunctiva/sclera: Conjunctivae normal.   Cardiovascular:      Rate and Rhythm: Normal rate and regular rhythm.      Pulses: Normal pulses.   Pulmonary:      Effort: Pulmonary effort is normal.      Breath sounds: Normal breath sounds.   Abdominal:      Palpations: Abdomen is soft.   Musculoskeletal:         General: Normal range of motion.      Cervical back: Normal range of motion.      Comments: Stopped forward   Skin:     General: Skin is warm and dry.   Neurological:      General: No focal deficit present.   Psychiatric:         Mood and Affect: Mood normal.       /80 (BP Location: Right arm, Patient Position: Sitting, BP Cuff Size: Adult)   Pulse 69   Temp 35.7 °C  "(96.2 °F) (Temporal)   Ht 1.702 m (5' 7\")   Wt 73.9 kg (163 lb)   BMI 25.53 kg/m²      Assessment/Plan   Problem List Items Addressed This Visit       Primary hypertension - Primary    Hiatal hernia     Other Visit Diagnoses       Gastroesophageal reflux disease without esophagitis            Eventful 6 months except that her squamous cell carcinoma was removed from right leg, basal cell carcinoma removed from right arm and verrucae was removed from right thigh.  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  No fall, patient will take little steps at home, absolutely great care has been provided by family, no ER visit, vaccinations are reviewed, medications are reviewed, tongue lesion was removed, follow-up in 6 months.  Patient is known to me for more than 20 years with all this care, problem, diagnosis and management has been provided on a consistent basis, I will see her back in 6 months could be virtual care visit.  "

## 2025-02-14 ENCOUNTER — TELEPHONE (OUTPATIENT)
Dept: PRIMARY CARE | Facility: CLINIC | Age: 89
End: 2025-02-14
Payer: MEDICARE

## 2025-02-14 DIAGNOSIS — R05.9 COUGH, UNSPECIFIED TYPE: ICD-10-CM

## 2025-02-14 RX ORDER — BENZONATATE 100 MG/1
100 CAPSULE ORAL 3 TIMES DAILY PRN
Qty: 42 CAPSULE | Refills: 0 | Status: SHIPPED | OUTPATIENT
Start: 2025-02-14 | End: 2025-03-16

## 2025-02-20 ENCOUNTER — APPOINTMENT (OUTPATIENT)
Dept: PRIMARY CARE | Facility: CLINIC | Age: 89
End: 2025-02-20
Payer: MEDICARE

## 2025-02-20 VITALS — SYSTOLIC BLOOD PRESSURE: 168 MMHG | DIASTOLIC BLOOD PRESSURE: 87 MMHG

## 2025-02-20 DIAGNOSIS — R11.2 NAUSEA AND VOMITING, UNSPECIFIED VOMITING TYPE: ICD-10-CM

## 2025-02-20 DIAGNOSIS — I10 PRIMARY HYPERTENSION: ICD-10-CM

## 2025-02-20 DIAGNOSIS — J40 BRONCHITIS: Primary | ICD-10-CM

## 2025-02-20 PROCEDURE — 99213 OFFICE O/P EST LOW 20 MIN: CPT | Performed by: INTERNAL MEDICINE

## 2025-02-20 PROCEDURE — 1123F ACP DISCUSS/DSCN MKR DOCD: CPT | Performed by: INTERNAL MEDICINE

## 2025-02-20 PROCEDURE — 3079F DIAST BP 80-89 MM HG: CPT | Performed by: INTERNAL MEDICINE

## 2025-02-20 PROCEDURE — 1160F RVW MEDS BY RX/DR IN RCRD: CPT | Performed by: INTERNAL MEDICINE

## 2025-02-20 PROCEDURE — 1036F TOBACCO NON-USER: CPT | Performed by: INTERNAL MEDICINE

## 2025-02-20 PROCEDURE — 3077F SYST BP >= 140 MM HG: CPT | Performed by: INTERNAL MEDICINE

## 2025-02-20 PROCEDURE — 1159F MED LIST DOCD IN RCRD: CPT | Performed by: INTERNAL MEDICINE

## 2025-02-20 RX ORDER — AMLODIPINE BESYLATE 5 MG/1
5 TABLET ORAL DAILY
Qty: 30 TABLET | Refills: 6 | Status: SHIPPED | OUTPATIENT
Start: 2025-02-20

## 2025-02-20 RX ORDER — PREDNISONE 20 MG/1
TABLET ORAL
COMMUNITY
Start: 2025-02-14 | End: 2025-02-20 | Stop reason: SDUPTHER

## 2025-02-20 RX ORDER — ONDANSETRON 4 MG/1
4 TABLET, FILM COATED ORAL EVERY 8 HOURS PRN
Qty: 50 TABLET | Refills: 0 | Status: SHIPPED | OUTPATIENT
Start: 2025-02-20

## 2025-02-20 RX ORDER — PREDNISONE 20 MG/1
10 TABLET ORAL DAILY
Qty: 4 TABLET | Refills: 0 | Status: SHIPPED | OUTPATIENT
Start: 2025-02-20 | End: 2025-02-27

## 2025-02-20 ASSESSMENT — ENCOUNTER SYMPTOMS
WEAKNESS: 1
ARTHRALGIAS: 1
CHILLS: 1
WOUND: 0
CARDIOVASCULAR NEGATIVE: 1
ABDOMINAL PAIN: 0
EYES NEGATIVE: 1
COUGH: 1

## 2025-02-20 ASSESSMENT — COPD QUESTIONNAIRES: COPD: 0

## 2025-02-20 NOTE — PROGRESS NOTES
Subjective   Patient ID: Angela Alba is a 89 y.o. female who presents for No chief complaint on file..  Cough  This is a new problem. The current episode started in the past 7 days. The problem has been gradually improving. The problem occurs every few minutes. The cough is Productive of sputum. Associated symptoms include chills and nasal congestion. Pertinent negatives include no chest pain. The treatment provided mild relief. There is no history of COPD.     Hypertension  This is a chronic problem. The current episode started more than 1 year ago. The problem has been resolved since onset. The problem is controlled. Pertinent negatives include no anxiety, blurred vision or chest pain. Risk factors for coronary artery disease include post-menopausal state and sedentary lifestyle. Past treatments include calcium channel blockers. The current treatment provides significant improvement. There are no compliance problems.  There is no history of angina or kidney disease.   Past Medical History  Past Medical History:   Diagnosis Date    Hiatal hernia     Uses roller walker        Social History  Social History     Tobacco Use    Smoking status: Never    Smokeless tobacco: Never   Vaping Use    Vaping status: Never Used   Substance Use Topics    Alcohol use: Never    Drug use: Never       Family History     Family History   Problem Relation Name Age of Onset    Other (Abdominal Aoritc Aneurysm) Mother      Other (Postmenopausal Osteoporosis) Mother      Other (Histoplasmosis) Other FH        Allergies:  Allergies   Allergen Reactions    Codeine Unknown, Nausea And Vomiting, Nausea Only, Other and Nausea/vomiting     Derivatives    Nitrofurantoin Macrocrystal Unknown, Nausea And Vomiting and Other     Pt was severely ill after taking    Ciprofloxacin Nausea And Vomiting, Other, Unknown and Nausea/vomiting    Hydromorphone Other    Rosuvastatin Unknown and Other    Nitrofurantoin Nausea And Vomiting        Outpatient  Medications:  Current Outpatient Medications   Medication Instructions    amLODIPine (NORVASC) 2.5 mg, oral, Daily    benzonatate (TESSALON) 100 mg, oral, 3 times daily PRN, Do not crush or chew.    camphor-methyl salicyl-menthoL (Muscle Rub Ultra-Strength) 4-30-10 % cream Topical    cetirizine (ZYRTEC) 10 mg, oral, Daily    docusate sodium (STOOL SOFTENER ORAL) oral    LORazepam (ATIVAN) 0.5 mg, oral, Daily PRN    omeprazole (PRILOSEC) 40 mg, oral, Daily, Do not crush or chew.    ondansetron (Zofran) 4 mg tablet TAKE ONE (1) TABLET BY MOUTH EVERY 8 HOURS IF NEEDED FOR NAUSEA OR VOMITING    polyethylene glycol (MIRALAX) 17 g, oral        Review of Systems   Constitutional:  Positive for chills.   HENT:  Positive for congestion.    Eyes: Negative.    Respiratory:  Positive for cough.    Cardiovascular: Negative.  Negative for chest pain.   Gastrointestinal:  Negative for abdominal pain.   Musculoskeletal:  Positive for arthralgias.   Skin:  Negative for wound.   Neurological:  Positive for weakness.         Objective   It is telephonic care visit.    Physical Exam  /87      Assessment/Plan   Problem List Items Addressed This Visit    None  Visit Diagnoses       Bronchitis    -  Primary        Patient was seen on a telephonic visit because we could not accomplish a virtual care visit because of technical issue.  Visit was facilitated by patient's daughter-in-law.  Patient was communicated via telephonic visit.  Visit lasted for more than 12 minutes.  She was in local emergency room, reports were reviewed, chest x-ray flu smears and other laboratories and ER document were reviewed, it was of bronchitis, lingering cough is there, prednisone will be extended for 7 more days.  BP readings were high because of her lingering bronchitis, increase amlodipine to 5 mg, Tessalon can be used, patient has some raspiness on telephonic communication.  It was not influenza or any viral isolation seen on the reports.  Hopefully  she will do well, she is well taken care by son and daughter-in-law, make sure to stay warm, if things does not get better please call us, I will check with the patient over the weekend on a telephonic communication once again.  She will be seen by me on a schedule appointment.

## 2025-05-20 ENCOUNTER — APPOINTMENT (OUTPATIENT)
Dept: PRIMARY CARE | Facility: CLINIC | Age: 89
End: 2025-05-20
Payer: MEDICARE

## 2025-05-20 VITALS
DIASTOLIC BLOOD PRESSURE: 76 MMHG | BODY MASS INDEX: 26.47 KG/M2 | WEIGHT: 169 LBS | HEART RATE: 78 BPM | SYSTOLIC BLOOD PRESSURE: 145 MMHG

## 2025-05-20 DIAGNOSIS — Z00.00 ROUTINE GENERAL MEDICAL EXAMINATION AT HEALTH CARE FACILITY: Primary | ICD-10-CM

## 2025-05-20 PROCEDURE — G0439 PPPS, SUBSEQ VISIT: HCPCS | Performed by: INTERNAL MEDICINE

## 2025-05-20 PROCEDURE — 1159F MED LIST DOCD IN RCRD: CPT | Performed by: INTERNAL MEDICINE

## 2025-05-20 PROCEDURE — 1160F RVW MEDS BY RX/DR IN RCRD: CPT | Performed by: INTERNAL MEDICINE

## 2025-05-20 PROCEDURE — 1036F TOBACCO NON-USER: CPT | Performed by: INTERNAL MEDICINE

## 2025-05-20 PROCEDURE — 1170F FXNL STATUS ASSESSED: CPT | Performed by: INTERNAL MEDICINE

## 2025-05-20 PROCEDURE — 3078F DIAST BP <80 MM HG: CPT | Performed by: INTERNAL MEDICINE

## 2025-05-20 PROCEDURE — 3077F SYST BP >= 140 MM HG: CPT | Performed by: INTERNAL MEDICINE

## 2025-05-20 ASSESSMENT — ENCOUNTER SYMPTOMS
SHORTNESS OF BREATH: 0
NERVOUS/ANXIOUS: 0
APPETITE CHANGE: 0
COUGH: 1
VOMITING: 0
WOUND: 0
FATIGUE: 0
ABDOMINAL PAIN: 0
LOSS OF SENSATION IN FEET: 0
CHOKING: 0
DIZZINESS: 0
WEAKNESS: 1
NAUSEA: 0
OCCASIONAL FEELINGS OF UNSTEADINESS: 1
ACTIVITY CHANGE: 0
DEPRESSION: 0
ARTHRALGIAS: 1

## 2025-05-20 ASSESSMENT — ACTIVITIES OF DAILY LIVING (ADL)
FEEDING YOURSELF: INDEPENDENT
BATHING: NEEDS ASSISTANCE
WALKS IN HOME: DEPENDENT
EATING: NEEDS ASSISTANCE
NEEDS ASSISTANCE WITH FOOD: SET UP OF PLATE
DOING HOUSEWORK: TOTAL CARE
ASSISTIVE_DEVICE: WALKER
JUDGMENT_ADEQUATE_SAFELY_COMPLETE_DAILY_ACTIVITIES: YES
TOILETING: INDEPENDENT
PREPARING MEALS: TOTAL CARE
DRESSING YOURSELF: INDEPENDENT
PATIENT'S MEMORY ADEQUATE TO SAFELY COMPLETE DAILY ACTIVITIES?: YES
GROOMING: INDEPENDENT
STIL DRIVING: NO
GROCERY SHOPPING: TOTAL CARE
TAKING MEDICATION: NEEDS ASSISTANCE
USING TELEPHONE: NEEDS ASSISTANCE
HEARING - LEFT EAR: FUNCTIONAL
PILL BOX USED: NO
TOILETING: INDEPENDENT
ADEQUATE_TO_COMPLETE_ADL: YES
ADEQUATE_TO_COMPLETE_ADL: YES
DRESSING: INDEPENDENT
FEEDING: INDEPENDENT
BATHING: NEEDS ASSISTANCE
USING TRANSPORTATION: TOTAL CARE
JUDGMENT_ADEQUATE_SAFELY_COMPLETE_DAILY_ACTIVITIES: YES
HEARING - RIGHT EAR: FUNCTIONAL
MANAGING FINANCES: TOTAL CARE

## 2025-05-20 ASSESSMENT — PATIENT HEALTH QUESTIONNAIRE - PHQ9
2. FEELING DOWN, DEPRESSED OR HOPELESS: NOT AT ALL
2. FEELING DOWN, DEPRESSED OR HOPELESS: NOT AT ALL
SUM OF ALL RESPONSES TO PHQ9 QUESTIONS 1 AND 2: 0
SUM OF ALL RESPONSES TO PHQ9 QUESTIONS 1 AND 2: 0
1. LITTLE INTEREST OR PLEASURE IN DOING THINGS: NOT AT ALL
1. LITTLE INTEREST OR PLEASURE IN DOING THINGS: NOT AT ALL

## 2025-05-20 NOTE — PROGRESS NOTES
Subjective   Reason for Visit: Angela Alba is an 89 y.o. female here for a Medicare Wellness visit.   Virtual or Telephone Consent    An interactive audio and video telecommunication system which permits real time communications between the patient (at the originating site) and provider (at the distant site) was utilized to provide this telehealth service.   Verbal consent was requested and obtained from Angela Alba on this date, 05/20/25 for a telehealth visit and the patient's location was confirmed at the time of the visit.   Past Medical, Surgical, and Family History reviewed and updated in chart.    Reviewed all medications by prescribing practitioner or clinical pharmacist (such as prescriptions, OTCs, herbal therapies and supplements) and documented in the medical record.    89-year-old female patient was seen for virtual care visit wellness Medicare exam, reason we did a virtual care visit because patient required a lot of help and assist for transportation and patient lives about 45 miles from here.  Since I saw her last time there is no significant change in this patient's condition, she has been homebound, she has been helped by family, she has been helped by support care, she is wheelchair-bound, she will use a walker for mobility and ambulation.  She did not fall, daughter-in-law is available on a virtual care visit, there is no wound, there is no obvious concern besides mild cough and congestion.  Long time ago patient has a hip fracture, long time ago she has internal hernia and she has a malrotated bowel, she is running her 80/9-year, they have a living will, certain vaccinations cannot be given as we think because of transportation issues, they get annual flu shot on a regular basis.  Patient appeared well on a virtual care visit.        Patient Care Team:  Barrington Yang MD as PCP - General  Barrington Yang MD as PCP - [employer] PCP     Review of Systems   Constitutional:   Negative for activity change, appetite change and fatigue.   HENT:  Positive for congestion.    Eyes:  Negative for visual disturbance.   Respiratory:  Positive for cough. Negative for choking and shortness of breath.    Cardiovascular:  Negative for chest pain.   Gastrointestinal:  Negative for abdominal pain, nausea and vomiting.   Musculoskeletal:  Positive for arthralgias and gait problem.   Skin:  Negative for wound.   Neurological:  Positive for weakness. Negative for dizziness.   Psychiatric/Behavioral:  The patient is not nervous/anxious.        Objective   Vitals:  /76   Pulse 78   Wt 76.7 kg (169 lb)   BMI 26.47 kg/m²       Physical Exam  Physical exam was not done as it was a virtual care visit as requested because patient has substantial difficulty with her transportation as she is a wheelchair-bound and they live about 45 miles away from here.  Virtual care visit was assisted by patient's daughter-in-law.  Patient appeared well on a virtual care visit.  Assessment & Plan  Routine general medical examination at health care facility    Orders:    1 Year Follow Up In Primary Care - Wellness Exam; Future  Wellness exam was completed, well-appearing female patient known to me for several years, she was very pleasant calm and comfortable.  BP readings has been stable at home, daughter supplied BP readings from home, it has not been changed significantly.  Patient will continue amlodipine as listed.  General condition assessed, ADLs IADLs reviewed, excellent care has been provided, patient's appetite has been fair, no problem with nausea vomiting.  No depression, no anhedonia, she was able to communicate with me without any problem comprehension and cognitive state of mind appears to be as good as it has been.  Patient was advised to come here for follow-up check so we will do virtual care visit 1 time a year and live visit here or in person visit once a year as I communicated with patient's  daughter-in-law but no other concerns, they can use Claritin or Mucinex to combat this acute congestive respiratory illness, there is no fever or chills.  Laboratories will be done at the next visit.

## 2025-08-20 DIAGNOSIS — R11.2 NAUSEA AND VOMITING, UNSPECIFIED VOMITING TYPE: ICD-10-CM

## 2025-08-20 DIAGNOSIS — K21.9 GASTROESOPHAGEAL REFLUX DISEASE WITHOUT ESOPHAGITIS: ICD-10-CM

## 2025-08-20 RX ORDER — ONDANSETRON 4 MG/1
4 TABLET, FILM COATED ORAL EVERY 8 HOURS PRN
Qty: 50 TABLET | Refills: 0 | Status: SHIPPED | OUTPATIENT
Start: 2025-08-20

## 2025-08-20 RX ORDER — OMEPRAZOLE 40 MG/1
40 CAPSULE, DELAYED RELEASE ORAL DAILY
Qty: 30 CAPSULE | Refills: 0 | Status: SHIPPED | OUTPATIENT
Start: 2025-08-20

## 2025-11-25 ENCOUNTER — APPOINTMENT (OUTPATIENT)
Dept: PRIMARY CARE | Facility: CLINIC | Age: 89
End: 2025-11-25
Payer: MEDICARE

## 2025-12-02 ENCOUNTER — APPOINTMENT (OUTPATIENT)
Dept: PRIMARY CARE | Facility: CLINIC | Age: 89
End: 2025-12-02
Payer: MEDICARE

## (undated) DEVICE — GLOVE ORANGE PI 8 1/2   MSG9085

## (undated) DEVICE — 4-PORT MANIFOLD: Brand: NEPTUNE 2

## (undated) DEVICE — GLOVE SURG SZ 9 THK91MIL LTX FREE SYN POLYISOPRENE ANTI

## (undated) DEVICE — SYRINGE MED 50ML LUERLOCK TIP

## (undated) DEVICE — Device

## (undated) DEVICE — NEEDLE, SAFETY, 18 G X 1.5 IN

## (undated) DEVICE — SUTURE ABSORBABLE ANTIBACT 1-0 CT-1 24 IN STRATAFIX PDS + SXPP1A443

## (undated) DEVICE — FAN SPRAY KIT: Brand: PULSAVAC®

## (undated) DEVICE — KIT, MARGINMARKER, 6 INK COLORS, STANDARD

## (undated) DEVICE — GOWN, SURGICAL, ROYAL SILK, LG, STERILE

## (undated) DEVICE — DRAPE, SHEET, 17 X 23 IN

## (undated) DEVICE — SLING, ARM, W/LEATHERETTE PAD, MEDIUM

## (undated) DEVICE — BOWL, BASIN, 32 OZ, STERILE

## (undated) DEVICE — GOWN, SURGICAL, ROYAL SILK, XL, STERILE

## (undated) DEVICE — ELECTRODE ES L275IN 275IN BLDE TIP COAT PTFE TEF W EVAC

## (undated) DEVICE — SUTURE VICRYL SZ 2-0 L36IN ABSRB UD L36MM CT-1 1/2 CIR J945H

## (undated) DEVICE — SHEET,DRAPE,53X77,STERILE: Brand: MEDLINE

## (undated) DEVICE — NEEDLE CIRCLE 1/2 IN SUTURE TAPER L1.950IN DIA0.056IN ABD S STL

## (undated) DEVICE — SUTURE MONOCRYL STRATAFIX SPRL + SZ 3-0 L18IN ABSRB UD PS-2 SXMP1B107

## (undated) DEVICE — Device: Brand: STABLECUT®

## (undated) DEVICE — SYRINGE, 10 CC, LUER LOCK

## (undated) DEVICE — REST, HEAD, BAGEL, 9 IN

## (undated) DEVICE — CORD, FORCEP, BIPOLAR, DISP

## (undated) DEVICE — DRAPE, SHEET, XL

## (undated) DEVICE — DRESSING, MEPILEX BORDER, POST-OP AG, 4 X 10 IN

## (undated) DEVICE — BANDAGE COBAN 4 IN COMPR W4INXL5YD FOAM COHESIVE QUIK STK SELF ADH SFT

## (undated) DEVICE — STRAP, ARM BOARD, 32 X 1.5

## (undated) DEVICE — ELECTRODE, ELECTROSURGICAL, BLADE, INSULATED, ENT/IMA, STERILE

## (undated) DEVICE — CUP, MEDICINE, GRADUATED, 2 OZ, PLASTIC, DISP, LF

## (undated) DEVICE — GLOVE, SURGICAL, PROTEXIS PI , 7.5, PF, LF

## (undated) DEVICE — SUTURE ETHIBOND EXCEL SZ 1 L30IN NONABSORBABLE GRN L36MM CT-1 X425H

## (undated) DEVICE — DRAPE,HIP,W/POUCHES,STERILE: Brand: MEDLINE

## (undated) DEVICE — GOWN,SIRUS,POLYRNF,BRTHSLV,XLN/XXL,18/CS: Brand: MEDLINE

## (undated) DEVICE — BANDAGE, ESMARK, 6 IN X 9 FT, STERILE

## (undated) DEVICE — DRESSING, MEPILEX BORDER, POST-OP AG, 4 X 6 IN

## (undated) DEVICE — TRAY, DRY PREP, PREMIUM

## (undated) DEVICE — PROTECTOR, NERVE, ULNAR, PINK

## (undated) DEVICE — SUTURE VICRYL SZ 1 L36IN ABSRB UD L36MM CT-1 1/2 CIR J947H

## (undated) DEVICE — MARKER, SKIN, W/ RULER, LF, STERILE

## (undated) DEVICE — HOOD: Brand: T7PLUS

## (undated) DEVICE — PILLOW POS W15XH6XL22IN RASPBERRY FOAM ABD W/ STRP DISP FOR

## (undated) DEVICE — SUTURE, VICRYL, 3-0, 27 IN X-1, UNDYED

## (undated) DEVICE — TOWEL PACK, STERILE, 16X24, XRAY DETECTABLE, BLUE, 4/PK

## (undated) DEVICE — DRESSING NEG PRSS 20CM PREVENA

## (undated) DEVICE — TOTAL HIP: Brand: MEDLINE INDUSTRIES, INC.

## (undated) DEVICE — DRAPE, SHEET, U, W/ADHESIVE STRIP, IMPERVIOUS, 60 X 70 IN, DISPOSABLE, LF, STERILE

## (undated) DEVICE — STRAP, VELCRO, BODY, 4 X 60IN, NS

## (undated) DEVICE — DRESSING, NON-ADHERENT, TELFA, OUCHLESS, 3 X 8 IN, STERILE

## (undated) DEVICE — SUTURE, ETHILON, 3-0, 18 IN, PS1, BLACK

## (undated) DEVICE — MANIFOLD, 4 PORT NEPTUNE STANDARD

## (undated) DEVICE — NEEDLE, SAFETY, 25 GA X 1.5 IN